# Patient Record
Sex: FEMALE | Race: WHITE | NOT HISPANIC OR LATINO | Employment: PART TIME | URBAN - METROPOLITAN AREA
[De-identification: names, ages, dates, MRNs, and addresses within clinical notes are randomized per-mention and may not be internally consistent; named-entity substitution may affect disease eponyms.]

---

## 2017-02-27 ENCOUNTER — OFFICE VISIT (OUTPATIENT)
Dept: FAMILY MEDICINE CLINIC | Facility: CLINIC | Age: 27
End: 2017-02-27

## 2017-02-27 VITALS
SYSTOLIC BLOOD PRESSURE: 120 MMHG | HEART RATE: 93 BPM | TEMPERATURE: 98.3 F | BODY MASS INDEX: 43.4 KG/M2 | OXYGEN SATURATION: 97 % | WEIGHT: 293 LBS | HEIGHT: 69 IN | DIASTOLIC BLOOD PRESSURE: 80 MMHG

## 2017-02-27 DIAGNOSIS — B02.9 HERPES ZOSTER WITHOUT COMPLICATION: Primary | ICD-10-CM

## 2017-02-27 DIAGNOSIS — B37.31 YEAST VAGINITIS: ICD-10-CM

## 2017-02-27 PROCEDURE — 99213 OFFICE O/P EST LOW 20 MIN: CPT | Performed by: FAMILY MEDICINE

## 2017-02-27 RX ORDER — ACETAMINOPHEN AND CODEINE PHOSPHATE 300; 60 MG/1; MG/1
1 TABLET ORAL EVERY 4 HOURS PRN
Qty: 20 TABLET | Refills: 0 | Status: SHIPPED | OUTPATIENT
Start: 2017-02-27 | End: 2017-11-20

## 2017-02-27 RX ORDER — VALACYCLOVIR HYDROCHLORIDE 500 MG/1
500 TABLET, FILM COATED ORAL 3 TIMES DAILY
COMMUNITY
End: 2017-11-20

## 2017-02-27 RX ORDER — FLUCONAZOLE 150 MG/1
150 TABLET ORAL ONCE
Qty: 1 TABLET | Refills: 0 | Status: SHIPPED | OUTPATIENT
Start: 2017-02-27 | End: 2017-02-27

## 2017-02-27 RX ORDER — CEPHALEXIN 500 MG/1
500 CAPSULE ORAL 3 TIMES DAILY
COMMUNITY
End: 2017-03-07

## 2017-02-27 NOTE — PROGRESS NOTES
Subjective   Lanie Moctezuma is a 27 y.o. female. CC: shingles    History of Present Illness   Lanie is a 27 year old female who comes in today for shingles.  She started with pain in her right scapular area.  She then broke out with a rash so she went to the [facility] and was diagnosed with shingles.  She was given Valtrex as well as Tylenol #3 for the pain.  She was given Keflex because it was felt that there was bacterial infection as well.    The pain is severe and she has needed to take the pain medication.  She will need a refill on that.  The rash is still there but is starting to dry up.  Her work will not let her come back for at least 2 weeks because of this.  She requests a script for Diflucan because she is developing a yeast infection secondary to the antibiotic.          The following portions of the patient's history were reviewed and updated as appropriate: allergies, current medications, past medical history, past social history, past surgical history and problem list.    Review of Systems   Constitutional: Negative.  Negative for fatigue, fever and unexpected weight change.   HENT: Negative.  Negative for congestion.    Respiratory: Negative.  Negative for cough, shortness of breath and wheezing.    Cardiovascular: Negative.  Negative for chest pain, palpitations and leg swelling.   Gastrointestinal: Negative.  Negative for abdominal pain, blood in stool, constipation, diarrhea, nausea and vomiting.   Genitourinary: Negative.  Negative for difficulty urinating and hematuria.   Musculoskeletal: Negative.  Negative for arthralgias and back pain.   Skin: Positive for rash (rash consistent with shingles).   Neurological: Negative.  Negative for dizziness, light-headedness and headaches.   Psychiatric/Behavioral: The patient is nervous/anxious.        Objective   Physical Exam   Constitutional: She appears well-developed and well-nourished.   Cardiovascular: Normal rate, regular rhythm and normal heart  sounds.    No murmur heard.  Pulmonary/Chest: Breath sounds normal. No respiratory distress.   Skin: Skin is warm and dry.   Blisters on the right breast and near the right scapula   Psychiatric: She has a normal mood and affect. Her behavior is normal.   Nursing note and vitals reviewed.    Vitals:    02/27/17 1059   BP: 120/80   Pulse: 93   Temp: 98.3 °F (36.8 °C)   SpO2: 97%     Assessment/Plan   Problems Addressed this Visit     None      Visit Diagnoses     Herpes zoster without complication    -  Primary    Relevant Medications    valACYclovir (VALTREX) 500 MG tablet    acetaminophen-codeine (TYLENOL/CODEINE #4) 300-60 MG per tablet    Yeast vaginitis        Relevant Medications    fluconazole (DIFLUCAN) 150 MG tablet        Also seen for postherpetic neuralgia.  ELMER obtained today and is appropriate.

## 2017-02-28 PROBLEM — B02.29 PHN (POSTHERPETIC NEURALGIA): Status: ACTIVE | Noted: 2017-02-28

## 2017-02-28 PROBLEM — B02.9 HERPES ZOSTER WITHOUT COMPLICATION: Status: ACTIVE | Noted: 2017-02-28

## 2017-03-07 ENCOUNTER — TELEPHONE (OUTPATIENT)
Dept: FAMILY MEDICINE CLINIC | Facility: CLINIC | Age: 27
End: 2017-03-07

## 2017-03-07 DIAGNOSIS — B37.31 YEAST VAGINITIS: Primary | ICD-10-CM

## 2017-03-07 RX ORDER — FLUCONAZOLE 150 MG/1
150 TABLET ORAL ONCE
Qty: 1 TABLET | Refills: 0 | Status: SHIPPED | OUTPATIENT
Start: 2017-03-07 | End: 2017-03-07

## 2017-04-10 RX ORDER — VENLAFAXINE HYDROCHLORIDE 150 MG/1
150 CAPSULE, EXTENDED RELEASE ORAL DAILY
Qty: 30 CAPSULE | Refills: 5 | Status: SHIPPED | OUTPATIENT
Start: 2017-04-10 | End: 2017-06-08 | Stop reason: SDUPTHER

## 2017-04-14 ENCOUNTER — TELEPHONE (OUTPATIENT)
Dept: FAMILY MEDICINE CLINIC | Facility: CLINIC | Age: 27
End: 2017-04-14

## 2017-04-14 NOTE — TELEPHONE ENCOUNTER
Pt called and said that she is in [place] cause her dad is currently in hospice and help taking care of him. She said that she thinks that her shingles are back but now under her arm on her side. She said that her work is not going to let her go back, and she is supposed to go back on Monday and she was wondering if she needs more short term disability filled out would you do it?

## 2017-04-17 ENCOUNTER — TELEPHONE (OUTPATIENT)
Dept: FAMILY MEDICINE CLINIC | Facility: CLINIC | Age: 27
End: 2017-04-17

## 2017-04-17 NOTE — TELEPHONE ENCOUNTER
Pt called and did go to a clinic in [place] like you told her to it is confirmed that she does have shingles its on her breast in crack of bottom. She said that if possible will need you to fill out short term disability paper work cause they wont let her come back until its gone.

## 2017-04-20 ENCOUNTER — OFFICE VISIT (OUTPATIENT)
Dept: FAMILY MEDICINE CLINIC | Facility: CLINIC | Age: 27
End: 2017-04-20

## 2017-04-20 ENCOUNTER — HOSPITAL ENCOUNTER (EMERGENCY)
Facility: HOSPITAL | Age: 27
Discharge: HOME | End: 2017-04-20
Attending: EMERGENCY MEDICINE | Admitting: EMERGENCY MEDICINE

## 2017-04-20 VITALS
DIASTOLIC BLOOD PRESSURE: 75 MMHG | SYSTOLIC BLOOD PRESSURE: 122 MMHG | TEMPERATURE: 97.8 F | HEIGHT: 69 IN | BODY MASS INDEX: 43.4 KG/M2 | WEIGHT: 293 LBS | OXYGEN SATURATION: 95 % | HEART RATE: 78 BPM

## 2017-04-20 VITALS
DIASTOLIC BLOOD PRESSURE: 82 MMHG | SYSTOLIC BLOOD PRESSURE: 126 MMHG | RESPIRATION RATE: 16 BRPM | BODY MASS INDEX: 43.4 KG/M2 | OXYGEN SATURATION: 98 % | WEIGHT: 293 LBS | HEIGHT: 69 IN | HEART RATE: 82 BPM | TEMPERATURE: 99.1 F

## 2017-04-20 DIAGNOSIS — H60.551 ACUTE REACTIVE OTITIS EXTERNA OF RIGHT EAR: ICD-10-CM

## 2017-04-20 DIAGNOSIS — R21 RASH, SKIN: Primary | ICD-10-CM

## 2017-04-20 DIAGNOSIS — H61.321 EXTERNAL EAR CANAL STENOSIS, INFLAMMATORY, RIGHT: ICD-10-CM

## 2017-04-20 DIAGNOSIS — L73.9 FOLLICULITIS: Primary | ICD-10-CM

## 2017-04-20 PROCEDURE — 99283 EMERGENCY DEPT VISIT LOW MDM: CPT

## 2017-04-20 PROCEDURE — 99213 OFFICE O/P EST LOW 20 MIN: CPT | Performed by: FAMILY MEDICINE

## 2017-04-20 RX ORDER — SULFAMETHOXAZOLE AND TRIMETHOPRIM 800; 160 MG/1; MG/1
1 TABLET ORAL 2 TIMES DAILY
Qty: 20 TABLET | Refills: 0 | Status: SHIPPED | OUTPATIENT
Start: 2017-04-20 | End: 2017-11-20

## 2017-04-20 RX ORDER — HYDROCODONE BITARTRATE AND ACETAMINOPHEN 5; 325 MG/1; MG/1
1 TABLET ORAL EVERY 6 HOURS PRN
COMMUNITY
End: 2017-04-20

## 2017-04-20 RX ORDER — HYDROCODONE BITARTRATE AND ACETAMINOPHEN 5; 325 MG/1; MG/1
1 TABLET ORAL EVERY 6 HOURS PRN
Qty: 12 TABLET | Refills: 0 | Status: SHIPPED | OUTPATIENT
Start: 2017-04-20 | End: 2017-11-20

## 2017-04-20 NOTE — ED TRIAGE NOTES
Pt reports rash to right breast, under arm, chest, right ear, and jaw that started yesterday. Pt believes this is shingles as she had it one month ago. Pt denies fever or chills but does have severe right ear pain.
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
## 2017-04-21 ENCOUNTER — TELEPHONE (OUTPATIENT)
Dept: SOCIAL WORK | Facility: HOSPITAL | Age: 27
End: 2017-04-21

## 2017-04-21 NOTE — TELEPHONE ENCOUNTER
ED follow-up phone call. Taking prescribed meds; states she is not yet feeling any better. Reminded to f/u w/ PCP in 3 days as needed, and reminded re RTER as necessary. No questions/concerns

## 2017-04-24 ENCOUNTER — TELEPHONE (OUTPATIENT)
Dept: FAMILY MEDICINE CLINIC | Facility: CLINIC | Age: 27
End: 2017-04-24

## 2017-04-24 DIAGNOSIS — H66.91 RIGHT OTITIS MEDIA, UNSPECIFIED CHRONICITY, UNSPECIFIED OTITIS MEDIA TYPE: Primary | ICD-10-CM

## 2017-04-27 PROBLEM — H60.501 ACUTE OTITIS EXTERNA OF RIGHT EAR: Status: ACTIVE | Noted: 2017-04-27

## 2017-06-08 ENCOUNTER — TELEPHONE (OUTPATIENT)
Dept: FAMILY MEDICINE CLINIC | Facility: CLINIC | Age: 27
End: 2017-06-08

## 2017-06-08 RX ORDER — VENLAFAXINE HYDROCHLORIDE 150 MG/1
150 CAPSULE, EXTENDED RELEASE ORAL DAILY
Qty: 30 CAPSULE | Refills: 5 | Status: SHIPPED | OUTPATIENT
Start: 2017-06-08 | End: 2017-11-10 | Stop reason: SDUPTHER

## 2017-07-11 RX ORDER — VENLAFAXINE HYDROCHLORIDE 150 MG/1
CAPSULE, EXTENDED RELEASE ORAL
Qty: 30 CAPSULE | Refills: 4 | Status: SHIPPED | OUTPATIENT
Start: 2017-07-11 | End: 2017-11-20

## 2017-09-20 RX ORDER — FEXOFENADINE HCL 180 MG/1
180 TABLET ORAL DAILY
Qty: 90 TABLET | Refills: 3 | Status: SHIPPED | OUTPATIENT
Start: 2017-09-20 | End: 2018-10-01 | Stop reason: SDUPTHER

## 2017-09-20 RX ORDER — MONTELUKAST SODIUM 10 MG/1
10 TABLET ORAL NIGHTLY
Qty: 90 TABLET | Refills: 3 | Status: SHIPPED | OUTPATIENT
Start: 2017-09-20 | End: 2018-10-01 | Stop reason: SDUPTHER

## 2017-11-06 ENCOUNTER — TELEPHONE (OUTPATIENT)
Dept: FAMILY MEDICINE CLINIC | Facility: CLINIC | Age: 27
End: 2017-11-06

## 2017-11-06 DIAGNOSIS — B37.31 YEAST VAGINITIS: Primary | ICD-10-CM

## 2017-11-06 RX ORDER — FLUCONAZOLE 150 MG/1
150 TABLET ORAL ONCE
Qty: 1 TABLET | Refills: 0 | Status: SHIPPED | OUTPATIENT
Start: 2017-11-06 | End: 2017-11-06

## 2017-11-06 NOTE — TELEPHONE ENCOUNTER
Pt called and said that she has an appointment with you on the 20 th but has a really bad yeast infection and was wondering if we can call in some diflucan?

## 2017-11-10 DIAGNOSIS — F32.A ANXIETY AND DEPRESSION: Primary | ICD-10-CM

## 2017-11-10 DIAGNOSIS — F41.9 ANXIETY AND DEPRESSION: Primary | ICD-10-CM

## 2017-11-10 RX ORDER — VENLAFAXINE HYDROCHLORIDE 150 MG/1
150 CAPSULE, EXTENDED RELEASE ORAL DAILY
Qty: 90 CAPSULE | Refills: 5 | Status: SHIPPED | OUTPATIENT
Start: 2017-11-10 | End: 2017-11-20 | Stop reason: DRUGHIGH

## 2017-11-20 ENCOUNTER — OFFICE VISIT (OUTPATIENT)
Dept: FAMILY MEDICINE CLINIC | Facility: CLINIC | Age: 27
End: 2017-11-20

## 2017-11-20 VITALS
OXYGEN SATURATION: 97 % | TEMPERATURE: 98.9 F | WEIGHT: 293 LBS | SYSTOLIC BLOOD PRESSURE: 125 MMHG | HEART RATE: 98 BPM | HEIGHT: 69 IN | DIASTOLIC BLOOD PRESSURE: 70 MMHG | BODY MASS INDEX: 43.4 KG/M2

## 2017-11-20 DIAGNOSIS — F32.A DEPRESSION, UNSPECIFIED DEPRESSION TYPE: ICD-10-CM

## 2017-11-20 DIAGNOSIS — F41.9 ANXIETY: Primary | ICD-10-CM

## 2017-11-20 PROCEDURE — 99213 OFFICE O/P EST LOW 20 MIN: CPT | Performed by: FAMILY MEDICINE

## 2017-11-20 RX ORDER — VENLAFAXINE HYDROCHLORIDE 75 MG/1
75 CAPSULE, EXTENDED RELEASE ORAL DAILY
Qty: 30 CAPSULE | Refills: 0 | Status: SHIPPED | OUTPATIENT
Start: 2017-11-20 | End: 2017-12-17 | Stop reason: SDUPTHER

## 2017-11-20 NOTE — PROGRESS NOTES
Subjective   Lanie Moctezuma is a 27 y.o. female. CC: depression    History of Present Illness   Lanie is a 27 year old female who comes in today for depression and anxiety.  She states that the 150 mg Effexor is not working well for her.  Still feeling anxious and depressed.  Has heard good things about Zoloft and would really like to try that.  Discussed weaning off the Effexor and explained that this will be a process.  Not suicidal.  She had been seeing a therapist but there were difficulties scheduling because of her work schedule.    She has been thinking about seeing a psychiatrist but all the ones she has called told her that she would need to have a referral.  Explained that she needs to call her insurance company to get a list of those in her network.  Showed her the number on her insurance card to call.      The following portions of the patient's history were reviewed and updated as appropriate: allergies, current medications, past medical history, past social history, past surgical history and problem list.    Review of Systems   Constitutional: Negative.  Negative for fatigue and unexpected weight change.   HENT: Negative.  Negative for congestion.    Respiratory: Negative.  Negative for cough, shortness of breath and wheezing.    Cardiovascular: Negative.  Negative for chest pain, palpitations and leg swelling.   Gastrointestinal: Negative.  Negative for abdominal pain, diarrhea and nausea.   Genitourinary: Negative.  Negative for difficulty urinating.   Musculoskeletal: Negative.  Negative for arthralgias and back pain.   Skin: Negative.  Negative for rash.   Neurological: Negative.  Negative for dizziness, light-headedness and headaches.   Psychiatric/Behavioral: Positive for dysphoric mood. Negative for sleep disturbance. The patient is nervous/anxious.        Objective   Physical Exam   Constitutional: She is oriented to person, place, and time. She appears well-developed and well-nourished.  "  Cardiovascular: Normal rate, regular rhythm and normal heart sounds.    Pulmonary/Chest: Effort normal and breath sounds normal.   Neurological: She is alert and oriented to person, place, and time.   Skin: Skin is warm and dry. No rash noted.   Psychiatric: She has a normal mood and affect. Her behavior is normal.   Nursing note and vitals reviewed.    Vitals:    11/20/17 1518   BP: 125/70   Pulse: 98   Temp: 98.9 °F (37.2 °C)   SpO2: 97%   Weight: (!) 334 lb 4.8 oz (152 kg)   Height: 69\" (175.3 cm)     Assessment/Plan   Problems Addressed this Visit        Other    Anxiety - Primary    Relevant Medications    venlafaxine XR (EFFEXOR XR) 75 MG 24 hr capsule    sertraline (ZOLOFT) 50 MG tablet    Depression    Relevant Medications    venlafaxine XR (EFFEXOR XR) 75 MG 24 hr capsule    sertraline (ZOLOFT) 50 MG tablet        Explained how to wean off the Effexor.  She is to wean off of that before starting on the Zoloft.  She will call her insurance company for referral to psychiatrist.  She will also schedule an appointment with a therapist.  List of names given to her.         "

## 2017-12-17 DIAGNOSIS — F32.A DEPRESSION, UNSPECIFIED DEPRESSION TYPE: ICD-10-CM

## 2017-12-17 DIAGNOSIS — F41.9 ANXIETY: ICD-10-CM

## 2017-12-18 RX ORDER — VENLAFAXINE HYDROCHLORIDE 75 MG/1
CAPSULE, EXTENDED RELEASE ORAL
Qty: 30 CAPSULE | Refills: 0 | Status: SHIPPED | OUTPATIENT
Start: 2017-12-18 | End: 2018-02-05

## 2018-02-05 ENCOUNTER — OFFICE VISIT (OUTPATIENT)
Dept: FAMILY MEDICINE CLINIC | Facility: CLINIC | Age: 28
End: 2018-02-05

## 2018-02-05 VITALS
SYSTOLIC BLOOD PRESSURE: 120 MMHG | DIASTOLIC BLOOD PRESSURE: 70 MMHG | BODY MASS INDEX: 43.4 KG/M2 | HEIGHT: 69 IN | HEART RATE: 100 BPM | WEIGHT: 293 LBS | TEMPERATURE: 98.2 F | OXYGEN SATURATION: 97 %

## 2018-02-05 DIAGNOSIS — F33.2 SEVERE EPISODE OF RECURRENT MAJOR DEPRESSIVE DISORDER, WITHOUT PSYCHOTIC FEATURES (HCC): ICD-10-CM

## 2018-02-05 DIAGNOSIS — F41.9 ANXIETY: Primary | ICD-10-CM

## 2018-02-05 DIAGNOSIS — Z79.899 MEDICATION MANAGEMENT: ICD-10-CM

## 2018-02-05 PROCEDURE — 99214 OFFICE O/P EST MOD 30 MIN: CPT | Performed by: NURSE PRACTITIONER

## 2018-02-05 RX ORDER — HYDROXYZINE HYDROCHLORIDE 25 MG/1
25 TABLET, FILM COATED ORAL EVERY 8 HOURS PRN
Qty: 90 TABLET | Refills: 0 | Status: SHIPPED | OUTPATIENT
Start: 2018-02-05 | End: 2018-07-31

## 2018-02-05 RX ORDER — SERTRALINE HYDROCHLORIDE 100 MG/1
100 TABLET, FILM COATED ORAL DAILY
Qty: 30 TABLET | Refills: 1 | Status: SHIPPED | OUTPATIENT
Start: 2018-02-05 | End: 2018-07-31

## 2018-02-05 RX ORDER — SERTRALINE HYDROCHLORIDE 100 MG/1
100 TABLET, FILM COATED ORAL DAILY
Qty: 30 TABLET | Refills: 1 | Status: SHIPPED | OUTPATIENT
Start: 2018-02-05 | End: 2018-02-05 | Stop reason: SDUPTHER

## 2018-02-05 NOTE — PROGRESS NOTES
Subjective   Lanie Moctezuma is a 28 y.o. female presents with severe anxiety and depression. Started around 2011, treated previously with celexa 2011, then tried effexor x 2 years. Has taken ativan as needed but doesn't like to take that long term. At her last visit with Dr. Michel, she was weaning off effexor to start Zoloft. Took her first zoloft last night.   After starting meds, 3 bouts of depression, serious. Has been evaluated at the [facility] [place] [facility], recommended for intensive outpatient therapy. Has thoughts of suicide daily, but no current plan to act. Currently seeing hai Jimenez. Has not followed psychiatry in a while as both doctors left the area. She has not established with another Psych.     Anxiety   Presents for follow-up visit. Symptoms include decreased concentration, depressed mood, excessive worry, feeling of choking, nervous/anxious behavior, panic and suicidal ideas. Patient reports no insomnia, irritability or nausea. Symptoms occur most days. The severity of symptoms is interfering with daily activities. The quality of sleep is good. Nighttime awakenings: occasional.     Her past medical history is significant for anxiety/panic attacks and depression. Compliance with medications is %.   Depression   Visit Type: initial  Onset of symptoms: 1 to 6 months ago  Patient presents with the following symptoms: anhedonia, decreased concentration, depressed mood, excessive worry, feelings of hopelessness, feelings of worthlessness, hypersomnia, nervousness/anxiety, panic, suicidal ideas and thoughts of death.  Patient is not experiencing: insomnia, irritability, suicidal planning and weight gain.  Frequency of symptoms: most days   Severity: interfering with daily activities   Aggravated by: medication  Sleep quality: good  Nighttime awakenings: occasional  Risk factors: change in medication and family history  Patient has a history of: anxiety/panic attacks, depression and mental  illness  No history of: bipolar disorder, suicide attempt and substance abuse  Treatment tried: benzodiazepine, SSRI and individual therapy  Compliance with treatment: good  Improvement on treatment: mild           The following portions of the patient's history were reviewed and updated as appropriate: allergies, current medications, past family history, past medical history, past social history, past surgical history and problem list.    Review of Systems   Constitutional: Negative.  Negative for irritability and weight gain.   HENT: Negative.    Eyes: Negative.    Respiratory: Negative.    Cardiovascular: Negative.    Gastrointestinal: Negative.  Negative for nausea.   Endocrine: Negative.    Genitourinary: Negative.    Musculoskeletal: Negative.    Skin: Negative.    Allergic/Immunologic: Negative.    Neurological: Negative.    Hematological: Negative.    Psychiatric/Behavioral: Positive for decreased concentration and suicidal ideas. Negative for substance abuse. The patient is nervous/anxious. The patient does not have insomnia.        Objective   Physical Exam   Constitutional: She is oriented to person, place, and time. She appears well-developed and well-nourished.   HENT:   Head: Normocephalic and atraumatic.   Right Ear: Tympanic membrane, external ear and ear canal normal.   Left Ear: Tympanic membrane, external ear and ear canal normal.   Nose: Nose normal.   Mouth/Throat: Oropharynx is clear and moist and mucous membranes are normal.   Eyes: Conjunctivae are normal. Pupils are equal, round, and reactive to light.   Neck: Neck supple.   Cardiovascular: Normal rate, regular rhythm and normal heart sounds.  Exam reveals no gallop and no friction rub.    No murmur heard.  Pulmonary/Chest: Effort normal and breath sounds normal. No respiratory distress. She has no wheezes. She has no rales.   Abdominal: Soft. Bowel sounds are normal. She exhibits no distension. There is no tenderness.   Neurological: She is  alert and oriented to person, place, and time.   Skin: Skin is warm and dry.   Psychiatric: Her speech is normal and behavior is normal. Judgment and thought content normal. Cognition and memory are normal. She exhibits a depressed mood.   Vitals reviewed.      Assessment/Plan   Lanie was seen today for anxiety.    Diagnoses and all orders for this visit:    Anxiety  -     Thyroid Panel With TSH  -     Ambulatory Referral to Psychiatry    Severe episode of recurrent major depressive disorder, without psychotic features    Medication management  -     Comprehensive metabolic panel  -     CBC & Differential    Other orders  -     hydrOXYzine (ATARAX) 25 MG tablet; Take 1 tablet by mouth Every 8 (Eight) Hours As Needed for Itching.  -     Discontinue: sertraline (ZOLOFT) 100 MG tablet; Take 1 tablet by mouth Daily.        Patient agreed she would call 911 or talk to her mother for thoughts of self harm.  Agreeable to treatment plan.  Will refer to get established with psych, Dr. Boucher  Continue treatment with Dr. Sabas Fermin  Follow up in one month to evaluate medication effectiveness.   Increase zoloft to 100 mg daily  Start atarax as needed for panic/anxiety

## 2018-02-06 LAB
ALBUMIN SERPL-MCNC: 4 G/DL (ref 3.5–5.2)
ALBUMIN/GLOB SERPL: 1.4 G/DL
ALP SERPL-CCNC: 77 U/L (ref 39–117)
ALT SERPL-CCNC: 15 U/L (ref 1–33)
AST SERPL-CCNC: 8 U/L (ref 1–32)
BASOPHILS # BLD AUTO: 0.03 10*3/MM3 (ref 0–0.2)
BASOPHILS NFR BLD AUTO: 0.3 % (ref 0–1.5)
BILIRUB SERPL-MCNC: 0.2 MG/DL (ref 0.1–1.2)
BUN SERPL-MCNC: 11 MG/DL (ref 6–20)
BUN/CREAT SERPL: 13.6 (ref 7–25)
CALCIUM SERPL-MCNC: 9.1 MG/DL (ref 8.6–10.5)
CHLORIDE SERPL-SCNC: 99 MMOL/L (ref 98–107)
CO2 SERPL-SCNC: 24.9 MMOL/L (ref 22–29)
CREAT SERPL-MCNC: 0.81 MG/DL (ref 0.57–1)
EOSINOPHIL # BLD AUTO: 0.25 10*3/MM3 (ref 0–0.7)
EOSINOPHIL NFR BLD AUTO: 2.6 % (ref 0.3–6.2)
ERYTHROCYTE [DISTWIDTH] IN BLOOD BY AUTOMATED COUNT: 17 % (ref 11.7–13)
FT4I SERPL CALC-MCNC: 1.8 (ref 1.2–4.9)
GFR SERPLBLD CREATININE-BSD FMLA CKD-EPI: 101 ML/MIN/1.73
GFR SERPLBLD CREATININE-BSD FMLA CKD-EPI: 84 ML/MIN/1.73
GLOBULIN SER CALC-MCNC: 2.9 GM/DL
GLUCOSE SERPL-MCNC: 95 MG/DL (ref 65–99)
HCT VFR BLD AUTO: 37 % (ref 35.6–45.5)
HGB BLD-MCNC: 11.1 G/DL (ref 11.9–15.5)
IMM GRANULOCYTES # BLD: 0.03 10*3/MM3 (ref 0–0.03)
IMM GRANULOCYTES NFR BLD: 0.3 % (ref 0–0.5)
LYMPHOCYTES # BLD AUTO: 2.24 10*3/MM3 (ref 0.9–4.8)
LYMPHOCYTES NFR BLD AUTO: 23.6 % (ref 19.6–45.3)
MCH RBC QN AUTO: 24.5 PG (ref 26.9–32)
MCHC RBC AUTO-ENTMCNC: 30 G/DL (ref 32.4–36.3)
MCV RBC AUTO: 81.7 FL (ref 80.5–98.2)
MONOCYTES # BLD AUTO: 0.5 10*3/MM3 (ref 0.2–1.2)
MONOCYTES NFR BLD AUTO: 5.3 % (ref 5–12)
NEUTROPHILS # BLD AUTO: 6.43 10*3/MM3 (ref 1.9–8.1)
NEUTROPHILS NFR BLD AUTO: 67.9 % (ref 42.7–76)
PLATELET # BLD AUTO: 359 10*3/MM3 (ref 140–500)
POTASSIUM SERPL-SCNC: 4.6 MMOL/L (ref 3.5–5.2)
PROT SERPL-MCNC: 6.9 G/DL (ref 6–8.5)
RBC # BLD AUTO: 4.53 10*6/MM3 (ref 3.9–5.2)
SODIUM SERPL-SCNC: 137 MMOL/L (ref 136–145)
T3RU NFR SERPL: 23 % (ref 24–39)
T4 SERPL-MCNC: 8 UG/DL (ref 4.5–12)
TSH SERPL DL<=0.005 MIU/L-ACNC: 1.28 UIU/ML (ref 0.45–4.5)
WBC # BLD AUTO: 9.48 10*3/MM3 (ref 4.5–10.7)

## 2018-03-08 ENCOUNTER — OFFICE VISIT (OUTPATIENT)
Dept: FAMILY MEDICINE CLINIC | Facility: CLINIC | Age: 28
End: 2018-03-08

## 2018-03-08 VITALS
RESPIRATION RATE: 18 BRPM | HEART RATE: 93 BPM | DIASTOLIC BLOOD PRESSURE: 80 MMHG | OXYGEN SATURATION: 98 % | SYSTOLIC BLOOD PRESSURE: 128 MMHG | TEMPERATURE: 98.3 F

## 2018-03-08 DIAGNOSIS — F41.9 ANXIETY: Primary | ICD-10-CM

## 2018-03-08 DIAGNOSIS — F32.A DEPRESSION, UNSPECIFIED DEPRESSION TYPE: ICD-10-CM

## 2018-03-08 DIAGNOSIS — R60.0 LOWER EXTREMITY EDEMA: ICD-10-CM

## 2018-03-08 PROCEDURE — 99214 OFFICE O/P EST MOD 30 MIN: CPT | Performed by: NURSE PRACTITIONER

## 2018-03-08 RX ORDER — LORAZEPAM 0.5 MG/1
TABLET ORAL
Refills: 0 | COMMUNITY
Start: 2018-02-26 | End: 2018-07-31

## 2018-03-08 RX ORDER — TRIAMTERENE AND HYDROCHLOROTHIAZIDE 37.5; 25 MG/1; MG/1
1 CAPSULE ORAL DAILY PRN
Qty: 30 CAPSULE | Refills: 3 | Status: SHIPPED | OUTPATIENT
Start: 2018-03-08 | End: 2019-01-31

## 2018-03-08 RX ORDER — VILAZODONE HYDROCHLORIDE 20 MG/1
20 TABLET ORAL DAILY
COMMUNITY
End: 2018-07-31

## 2018-03-08 NOTE — PROGRESS NOTES
Subjective   Lanie Moctezuma is a 28 y.o. female.   Viibryd and ativan, weaned off zoloft, now followed by Dr. Drake for psychiatry. Seeing a counselor in [facility] as well, referred by Dr. Drake. Feels better, but at times manic and very martino. Has a follow up with Dr. Drake in the next few weeks. Has increased nausea related to medication. Instructed to take in the daytime. Trying to monitor diet, sleep patterns, mood. Interested in starting antiinflammatory diet and eventually stopping medications.    Does have reports of frequent diarrhea that has been ongoing since 2011, around the time she started antidepressants. Concerned this is a side effect. Hx of gallbladder removed in 2010. Has increased fiber but has noted bright red blood in stools in the past week or two.     Depression   Visit Type: follow-up  Patient presents with the following symptoms: anhedonia (improving), depressed mood, excessive worry, feelings of worthlessness (improving) and nervousness/anxiety.  Patient is not experiencing: shortness of breath, suicidal ideas, suicidal planning, thoughts of death, weight gain and weight loss.  Frequency of symptoms: most days   Severity: moderate          The following portions of the patient's history were reviewed and updated as appropriate: allergies, current medications, past family history, past medical history, past social history, past surgical history and problem list.    Review of Systems   Constitutional: Negative.  Negative for weight gain and weight loss.   HENT: Negative.    Eyes: Negative.    Respiratory: Negative.  Negative for shortness of breath.    Cardiovascular: Negative.    Gastrointestinal: Positive for diarrhea.   Endocrine: Negative.    Genitourinary: Negative.    Musculoskeletal: Negative.    Skin: Negative.    Allergic/Immunologic: Negative.    Neurological: Negative.    Psychiatric/Behavioral: Positive for agitation, dysphoric mood (improving) and sleep  disturbance. Negative for self-injury and suicidal ideas. The patient is nervous/anxious.        Objective   Physical Exam   Constitutional: She is oriented to person, place, and time. She appears well-developed and well-nourished.   HENT:   Head: Normocephalic and atraumatic.   Eyes: Pupils are equal, round, and reactive to light.   Neck: Neck supple.   Cardiovascular: Normal rate, regular rhythm and normal heart sounds.  Exam reveals no gallop and no friction rub.    No murmur heard.  Pulmonary/Chest: Effort normal and breath sounds normal. No respiratory distress. She has no wheezes. She has no rales.   Neurological: She is alert and oriented to person, place, and time.   Skin: Skin is warm and dry.   Psychiatric: She has a normal mood and affect.   Vitals reviewed.      Assessment/Plan   Diagnoses and all orders for this visit:    Anxiety    Depression, unspecified depression type    Lower extremity edema    Other orders  -     triamterene-hydrochlorothiazide (DYAZIDE) 37.5-25 MG per capsule; Take 1 capsule by mouth Daily As Needed (lower extremity edema).    Instructed patient to start OTC hemorrhoidal medication. For continued blood in stool after one week, she is to call office and will refer to GI. Patient agreeable.       PHQ-2/PHQ-9 Depression Screening 3/8/2018   Little interest or pleasure in doing things 1   Feeling down, depressed, or hopeless 1   Trouble falling or staying asleep, or sleeping too much 1   Feeling tired or having little energy 1   Poor appetite or overeating 1   Feeling bad about yourself - or that you are a failure or have let yourself or your family down 1   Trouble concentrating on things, such as reading the newspaper or watching television 1   Moving or speaking so slowly that other people could have noticed. Or the opposite - being so fidgety or restless that you have been moving around a lot more than usual 0   Thoughts that you would be better off dead, or of hurting yourself  in some way 0   Total Score 7   If you checked off any problems, how difficult have these problems made it for you to do your work, take care of things at home, or get along with other people? Extremely dIfficult     PHQ-2/PHQ-9 Depression Screening 2/5/2018   Little interest or pleasure in doing things 1   Feeling down, depressed, or hopeless 3   Trouble falling or staying asleep, or sleeping too much 3   Feeling tired or having little energy 3   Poor appetite or overeating 3   Feeling bad about yourself - or that you are a failure or have let yourself or your family down 3   Trouble concentrating on things, such as reading the newspaper or watching television 3   Moving or speaking so slowly that other people could have noticed. Or the opposite - being so fidgety or restless that you have been moving around a lot more than usual 3   Thoughts that you would be better off dead, or of hurting yourself in some way 3   Total Score 25   If you checked off any problems, how difficult have these problems made it for you to do your work, take care of things at home, or get along with other people?

## 2018-05-02 ENCOUNTER — TELEPHONE (OUTPATIENT)
Dept: FAMILY MEDICINE CLINIC | Facility: CLINIC | Age: 28
End: 2018-05-02

## 2018-05-02 NOTE — TELEPHONE ENCOUNTER
"Patient called stating she is having severe edema in her legs, went to urgent care and was basically told she was \"fat\", has seen Ciara and prescribed lasix. Patient is concerned with kidney disease and CHF, wants to know what to do  "

## 2018-05-03 ENCOUNTER — TELEPHONE (OUTPATIENT)
Dept: FAMILY MEDICINE CLINIC | Facility: CLINIC | Age: 28
End: 2018-05-03

## 2018-05-03 NOTE — TELEPHONE ENCOUNTER
Needs to be seen. Reviewed chart, kidney function was good in 2/2018. Unsure of cardiac history. We can discuss.

## 2018-05-03 NOTE — TELEPHONE ENCOUNTER
Pt aware to notify hospital to send records to PCP. If admitted will need to follow up after d/c if goes home from ED lets have her scheduled soon with me or NP.

## 2018-05-03 NOTE — TELEPHONE ENCOUNTER
Spoke with the patient's mother the patient is actually at [facility] now getting some test ran as well as a ultrasound

## 2018-07-31 ENCOUNTER — OFFICE VISIT (OUTPATIENT)
Dept: FAMILY MEDICINE CLINIC | Facility: CLINIC | Age: 28
End: 2018-07-31

## 2018-07-31 VITALS
HEART RATE: 110 BPM | SYSTOLIC BLOOD PRESSURE: 120 MMHG | DIASTOLIC BLOOD PRESSURE: 70 MMHG | BODY MASS INDEX: 43.4 KG/M2 | HEIGHT: 69 IN | OXYGEN SATURATION: 97 % | WEIGHT: 293 LBS | TEMPERATURE: 98.2 F

## 2018-07-31 DIAGNOSIS — F41.9 ANXIETY: Primary | ICD-10-CM

## 2018-07-31 PROCEDURE — 99213 OFFICE O/P EST LOW 20 MIN: CPT | Performed by: NURSE PRACTITIONER

## 2018-07-31 RX ORDER — CLONAZEPAM 0.5 MG/1
0.5 TABLET ORAL 2 TIMES DAILY PRN
COMMUNITY

## 2018-10-01 RX ORDER — MONTELUKAST SODIUM 10 MG/1
10 TABLET ORAL NIGHTLY
Qty: 90 TABLET | Refills: 1 | Status: SHIPPED | OUTPATIENT
Start: 2018-10-01 | End: 2019-03-31 | Stop reason: SDUPTHER

## 2018-10-01 RX ORDER — FEXOFENADINE HCL 180 MG/1
180 TABLET ORAL DAILY
Qty: 90 TABLET | Refills: 1 | Status: SHIPPED | OUTPATIENT
Start: 2018-10-01 | End: 2019-03-31 | Stop reason: SDUPTHER

## 2018-10-15 ENCOUNTER — OFFICE (OUTPATIENT)
Dept: URBAN - METROPOLITAN AREA CLINIC 75 | Facility: CLINIC | Age: 28
End: 2018-10-15

## 2018-10-15 VITALS
WEIGHT: 293 LBS | DIASTOLIC BLOOD PRESSURE: 82 MMHG | SYSTOLIC BLOOD PRESSURE: 124 MMHG | HEART RATE: 71 BPM | HEIGHT: 69 IN

## 2018-10-15 DIAGNOSIS — K62.5 HEMORRHAGE OF ANUS AND RECTUM: ICD-10-CM

## 2018-10-15 DIAGNOSIS — R19.7 DIARRHEA, UNSPECIFIED: ICD-10-CM

## 2018-10-15 DIAGNOSIS — R10.30 LOWER ABDOMINAL PAIN, UNSPECIFIED: ICD-10-CM

## 2018-10-15 PROCEDURE — 99204 OFFICE O/P NEW MOD 45 MIN: CPT

## 2018-11-29 ENCOUNTER — ON CAMPUS - OUTPATIENT (OUTPATIENT)
Dept: URBAN - METROPOLITAN AREA HOSPITAL 108 | Facility: HOSPITAL | Age: 28
End: 2018-11-29

## 2018-11-29 DIAGNOSIS — K29.50 UNSPECIFIED CHRONIC GASTRITIS WITHOUT BLEEDING: ICD-10-CM

## 2018-11-29 DIAGNOSIS — R10.30 LOWER ABDOMINAL PAIN, UNSPECIFIED: ICD-10-CM

## 2018-11-29 DIAGNOSIS — K62.5 HEMORRHAGE OF ANUS AND RECTUM: ICD-10-CM

## 2018-11-29 DIAGNOSIS — R19.7 DIARRHEA, UNSPECIFIED: ICD-10-CM

## 2018-11-29 DIAGNOSIS — K64.8 OTHER HEMORRHOIDS: ICD-10-CM

## 2018-11-29 PROCEDURE — 43239 EGD BIOPSY SINGLE/MULTIPLE: CPT | Performed by: INTERNAL MEDICINE

## 2018-11-29 PROCEDURE — 45380 COLONOSCOPY AND BIOPSY: CPT | Performed by: INTERNAL MEDICINE

## 2019-01-31 ENCOUNTER — OFFICE VISIT (OUTPATIENT)
Dept: FAMILY MEDICINE CLINIC | Facility: CLINIC | Age: 29
End: 2019-01-31

## 2019-01-31 VITALS
HEART RATE: 85 BPM | BODY MASS INDEX: 43.4 KG/M2 | DIASTOLIC BLOOD PRESSURE: 88 MMHG | SYSTOLIC BLOOD PRESSURE: 142 MMHG | WEIGHT: 293 LBS | HEIGHT: 69 IN | OXYGEN SATURATION: 98 % | TEMPERATURE: 98.3 F

## 2019-01-31 DIAGNOSIS — F41.9 ANXIETY: ICD-10-CM

## 2019-01-31 DIAGNOSIS — J30.9 ATOPIC RHINITIS: ICD-10-CM

## 2019-01-31 DIAGNOSIS — E66.01 MORBID OBESITY (HCC): Primary | ICD-10-CM

## 2019-01-31 DIAGNOSIS — R53.83 OTHER FATIGUE: ICD-10-CM

## 2019-01-31 PROCEDURE — 99213 OFFICE O/P EST LOW 20 MIN: CPT | Performed by: NURSE PRACTITIONER

## 2019-01-31 RX ORDER — COLESEVELAM 180 1/1
1250 TABLET ORAL 2 TIMES DAILY
Refills: 6 | COMMUNITY
Start: 2018-11-29 | End: 2020-01-30

## 2019-01-31 RX ORDER — CALCIUM POLYCARBOPHIL 625 MG 625 MG/1
1 TABLET ORAL DAILY
COMMUNITY
End: 2021-12-06

## 2019-01-31 RX ORDER — CALCIUM POLYCARBOPHIL 625 MG 625 MG/1
1 TABLET ORAL DAILY
COMMUNITY
End: 2020-01-30

## 2019-01-31 RX ORDER — FLUTICASONE PROPIONATE 50 MCG
2 SPRAY, SUSPENSION (ML) NASAL DAILY
Qty: 3 BOTTLE | Refills: 3 | Status: SHIPPED | OUTPATIENT
Start: 2019-01-31 | End: 2020-02-03

## 2019-02-05 NOTE — PROGRESS NOTES
Subjective   Lanie Moctezuma is a 29 y.o. female presents for follow up for anxiety. She is currently followed by Dr. Drake. She is currently in counseling as well. Denies any current issues with anxiety, doing much better. She is not taking any medication other than klonopin prn. Takes very rarely.     She does report fatigue. She has increased snoring and has a difficult time losing weight. Interested in a sleep study.     Runny nose, previously prescribed flonase, would like a refill.     Fatigue   This is a chronic problem. The current episode started more than 1 month ago. The problem occurs daily. The problem has been unchanged. Associated symptoms include fatigue. Pertinent negatives include no abdominal pain, anorexia, arthralgias, change in bowel habit, chest pain, chills, congestion, coughing, diaphoresis, fever, headaches, joint swelling, myalgias, nausea, neck pain, numbness, rash, sore throat, swollen glands, urinary symptoms, vertigo, visual change, vomiting or weakness. Nothing aggravates the symptoms. She has tried nothing for the symptoms. The treatment provided no relief.   Anxiety   Presents for follow-up visit. Symptoms include nervous/anxious behavior (occasionally). Patient reports no chest pain, compulsions, confusion, decreased concentration, depressed mood, dizziness, dry mouth, excessive worry, feeling of choking, hyperventilation, impotence, insomnia, irritability, malaise, muscle tension, nausea, obsessions, palpitations, panic, restlessness, shortness of breath or suicidal ideas. Symptoms occur rarely. The severity of symptoms is mild. The quality of sleep is good.     Compliance with medications: takes prn.        The following portions of the patient's history were reviewed and updated as appropriate: allergies, current medications, past family history, past medical history, past social history, past surgical history and problem list.    Review of Systems   Constitutional: Positive  for fatigue. Negative for chills, diaphoresis, fever and irritability.   HENT: Negative for congestion and sore throat.    Respiratory: Negative for cough and shortness of breath.    Cardiovascular: Negative for chest pain and palpitations.   Gastrointestinal: Negative for abdominal pain, anorexia, change in bowel habit, nausea and vomiting.   Genitourinary: Negative for impotence.   Musculoskeletal: Negative for arthralgias, joint swelling, myalgias and neck pain.   Skin: Negative for rash.   Neurological: Negative for dizziness, vertigo, weakness, numbness and headaches.   Psychiatric/Behavioral: Negative for confusion, decreased concentration and suicidal ideas. The patient is nervous/anxious (occasionally). The patient does not have insomnia.        Objective   Physical Exam   Constitutional: She is oriented to person, place, and time. She appears well-developed and well-nourished.   Cardiovascular: Normal rate, regular rhythm and normal heart sounds. Exam reveals no gallop and no friction rub.   No murmur heard.  Pulmonary/Chest: Effort normal and breath sounds normal. No stridor. No respiratory distress. She has no wheezes. She has no rales.   Neurological: She is alert and oriented to person, place, and time.   Psychiatric: She has a normal mood and affect.   Vitals reviewed.      Assessment/Plan   Lanie was seen today for follow-up.    Diagnoses and all orders for this visit:    Morbid obesity (CMS/[facility])  -     Ambulatory Referral to Sleep Medicine    Other fatigue  -     Ambulatory Referral to Sleep Medicine    Atopic rhinitis  -     fluticasone (FLONASE) 50 MCG/ACT nasal spray; 2 sprays into the nostril(s) as directed by provider Daily.    Anxiety      Continue current medication  Labs completed with Dr. Drake, will have labs faxed  Continue flonase, rx sent  Referral to sleep medicine

## 2019-02-20 ENCOUNTER — APPOINTMENT (OUTPATIENT)
Dept: SLEEP MEDICINE | Facility: HOSPITAL | Age: 29
End: 2019-02-20

## 2019-03-27 ENCOUNTER — OFFICE VISIT (OUTPATIENT)
Dept: SLEEP MEDICINE | Facility: HOSPITAL | Age: 29
End: 2019-03-27

## 2019-03-27 VITALS
WEIGHT: 293 LBS | SYSTOLIC BLOOD PRESSURE: 147 MMHG | OXYGEN SATURATION: 97 % | BODY MASS INDEX: 43.4 KG/M2 | HEART RATE: 80 BPM | DIASTOLIC BLOOD PRESSURE: 78 MMHG | HEIGHT: 69 IN

## 2019-03-27 DIAGNOSIS — G47.10 HYPERSOMNIA: Primary | ICD-10-CM

## 2019-03-27 DIAGNOSIS — G47.8 NON-RESTORATIVE SLEEP: ICD-10-CM

## 2019-03-27 PROCEDURE — 99213 OFFICE O/P EST LOW 20 MIN: CPT | Performed by: FAMILY MEDICINE

## 2019-03-27 PROCEDURE — G0463 HOSPITAL OUTPT CLINIC VISIT: HCPCS

## 2019-03-27 NOTE — PROGRESS NOTES
[facility] New Patient/Consultation       Reason for Consultation: Snoring and chronic fatigue    Referring provider: NAGA Silva      Patient Care Team:  Joanne Urbina APRN as PCP - General (Family Medicine)  Alpa Michel MD (Inactive) as PCP - Family Medicine  Jose Bell MD as Consulting Physician (Sleep Medicine)      History of present illness:  Thank you for asking me to see your patient.  The patient is a 29 y.o. female who reports snoring and chronic fatigue.  Per records patient's thyroid labs were last checked a year ago with normal TSH and T4 however slightly abnormal T3.  Has not been rechecked since then.  About a year ago her CBC also showed hemoglobin 11.1.  Reports had a colonoscopy in 11/2018 due to intermittent bleeding per rectum; was not bright red however not tarry either. Was found to have internal hemorrhoids and esophagitis. Patient is not currently on any iron supplementation.  Does not seem to be an iron panel on record.  Patient reports nonrestorative sleep and hypersomnia.  She wakes several times at night.  Particularly has difficulty breathing while lying flat.  Tonsillectomy in 2013    Sleep Schedule:  Bed time: 2 AM to 4 AM  Sleep latency: Varies anywhere from 30 minutes to hours  Wake time: 10:30 AM to 12 PM  Average hours slept: 7-9  Number of naps per day: 0 on weekdays and 0-1 on weekends  Rotating shifts?:yes and Works night shift from 6 PM to 12 AM Monday through Friday  Nocturia: yes and 1-3 times  Electronics in bedroom: yes    Excessive daytime sleepiness or drowsiness: yes  Any accidents at work due to sleepiness in the last 5 years:no  Any difficulty driving due to sleepiness or being drowsy: no  Weight changed in the last 5 years:yes and Gained 100 pounds    Snoring:yes  Witnessed apneas:Unsure  Have you ever awakened gasping for breath, coughing, choking or respiratory discomfort: yes  Morning headaches: yes  Awaken with a sore  throat or dry mouth: yes    Any reports of leg jerking at night: no  Urge sensations: no  Does pain disrupt sleep: no  Sweating during sleep:no  Teeth grinding: no    Any sudden episodes of sleep during the day: no  Sleep paralysis/hallucinations: no  Muscle weakness with laughing/anger: yes and When she laughs  Nightmares: no  Sleep walking: no    Are you sleepy when you increase your sleep time: no  Do you sleep better away from your own bed: no    Review of Systems negative except for: Nasal congestion, postnasal drip, swollen joints, poor appetite, swollen ankles, shortness of breath, dizziness, anxiety, depression, frequent heartburn, diarrhea, per sleep questionnaire    Social History: Systems use a wrap for [employer]; no cigarette or alcohol or drug use; 1-3 coffees or teas a day    Allergies:  Doxycycline    Family History: SOFI yes       Current Outpatient Medications:   •  calcium polycarbophil (FIBERCON) 625 MG tablet, Take 1 capsule by mouth Daily., Disp: , Rfl:   •  calcium polycarbophil (FIBERCON) 625 MG tablet, Take 1 tablet by mouth Daily., Disp: , Rfl:   •  Cholecalciferol (VITAMIN D3) 1000 units capsule, Take 4,000 Units by mouth., Disp: , Rfl:   •  clonazePAM (KlonoPIN) 0.5 MG tablet, Take 0.5 mg by mouth 2 (Two) Times a Day As Needed for Seizures., Disp: , Rfl:   •  colesevelam (WELCHOL) 625 MG tablet, Take 1,250 mg by mouth 2 (Two) Times a Day., Disp: , Rfl: 6  •  fexofenadine (ALLEGRA) 180 MG tablet, Take 1 tablet by mouth Daily., Disp: 90 tablet, Rfl: 1  •  fluticasone (FLONASE) 50 MCG/ACT nasal spray, 2 sprays into the nostril(s) as directed by provider Daily., Disp: 3 bottle, Rfl: 3  •  montelukast (SINGULAIR) 10 MG tablet, Take 1 tablet by mouth Every Night., Disp: 90 tablet, Rfl: 1  •  Probiotic Product (PROBIOTIC-10) capsule, Take 1 capsule by mouth Daily., Disp: , Rfl:     Vital Signs:    Vitals:    03/27/19 1016   BP: 147/78   Pulse: 80   SpO2: 97%   Weight: (!) 157 kg (346 lb)   Height: 175.3  "cm (69\")      Body mass index is 51.1 kg/m².  Neck Circumference: 17 inches      Physical Exam:   General Alert and oriented. No acute distress noted   Pharynx/Throat Class III Mallampati airway, large tongue, no evidence of redundant lateral pharyngeal tissue. No oral lesions. No thrush. Moist mucous membranes.   Head Normocephalic. Symmetrical. Atraumatic.    Nose No septal deviation. No drainage   Chest Wall Normal shape. Symmetric expansion with respiration. No tenderness.   Neck Trachea midline, no thyromegaly or adenopathy    Lungs Clear to auscultation bilaterally. No wheezes. No rhonchi. No rales. Respirations regular, even and unlabored.   Heart Regular rhythm and normal rate. Normal S1 and S2. No murmur   Abdomen Soft, non-tender and non-distended. Normal bowel sounds. No masses.   Extremities Moves all extremities well. No edema   Psychiatric Normal mood and affect.       Impression:  1. Hypersomnia    2. Non-restorative sleep        Plan:    Good sleep hygiene measures should be maintained.  Weight loss would be beneficial in this patient who is morbidly obese with BMI 51.1.    I discussed the pathophysiology of obstructive sleep apnea with the patient.  We discussed the adverse outcomes associated with untreated sleep-disordered breathing.  We discussed treatment modalities of obstructive sleep apnea including CPAP device as well as oral mandibular advancement device. Sleep study will be scheduled to establish definitive diagnosis of sleep disorder breathing.  Weight loss will be strongly beneficial in order to reduce the severity of sleep-disordered breathing.  Patient has narrow oropharyngeal structure.  Caution during activities that require prolonged concentration is strongly advised.  Patient will be notified of sleep study results after sleep study is completed.  If sleep apnea is only mild,  oral mandibular advancement device may be one of the treatment options.  However if sleep apnea is " moderately severe, CPAP treatment will be strongly encouraged.  The patient is not opposed to treatment with CPAP device if we confirm significant obstructive sleep apnea on polysomnography.     Thank you for allowing me to participate in your patient's care.    Jose Bell MD  Sleep Medicine  03/27/19  10:32 AM

## 2019-04-01 RX ORDER — MONTELUKAST SODIUM 10 MG/1
TABLET ORAL
Qty: 90 TABLET | Refills: 1 | Status: SHIPPED | OUTPATIENT
Start: 2019-04-01 | End: 2019-09-22 | Stop reason: SDUPTHER

## 2019-04-01 RX ORDER — FEXOFENADINE HCL 180 MG/1
TABLET ORAL
Qty: 90 TABLET | Refills: 1 | Status: SHIPPED | OUTPATIENT
Start: 2019-04-01 | End: 2019-09-22 | Stop reason: SDUPTHER

## 2019-05-15 ENCOUNTER — APPOINTMENT (OUTPATIENT)
Dept: SLEEP MEDICINE | Facility: HOSPITAL | Age: 29
End: 2019-05-15

## 2019-06-20 ENCOUNTER — HOSPITAL ENCOUNTER (OUTPATIENT)
Dept: SLEEP MEDICINE | Facility: HOSPITAL | Age: 29
Discharge: HOME | End: 2019-06-20
Admitting: FAMILY MEDICINE

## 2019-06-20 DIAGNOSIS — G47.8 NON-RESTORATIVE SLEEP: ICD-10-CM

## 2019-06-20 DIAGNOSIS — G47.10 HYPERSOMNIA: ICD-10-CM

## 2019-06-20 PROCEDURE — 95806 SLEEP STUDY UNATT&RESP EFFT: CPT

## 2019-06-26 PROCEDURE — 95806 SLEEP STUDY UNATT&RESP EFFT: CPT | Performed by: FAMILY MEDICINE

## 2019-06-27 ENCOUNTER — TELEPHONE (OUTPATIENT)
Dept: SLEEP MEDICINE | Facility: HOSPITAL | Age: 29
End: 2019-06-27

## 2019-06-27 NOTE — TELEPHONE ENCOUNTER
Tech called pts mobile #, pt answered. Tech went over study results and Dr's impression and pt verbalized her understanding. Patient confirmed DME of [facility] and scheduled F/U appt. Mailed results letter. MAB

## 2019-07-30 ENCOUNTER — OFFICE VISIT (OUTPATIENT)
Dept: FAMILY MEDICINE CLINIC | Facility: CLINIC | Age: 29
End: 2019-07-30

## 2019-07-30 VITALS
DIASTOLIC BLOOD PRESSURE: 84 MMHG | HEIGHT: 69 IN | WEIGHT: 293 LBS | SYSTOLIC BLOOD PRESSURE: 128 MMHG | BODY MASS INDEX: 43.4 KG/M2 | OXYGEN SATURATION: 96 % | HEART RATE: 92 BPM | RESPIRATION RATE: 16 BRPM | TEMPERATURE: 98 F

## 2019-07-30 DIAGNOSIS — F32.A DEPRESSION, UNSPECIFIED DEPRESSION TYPE: Primary | ICD-10-CM

## 2019-07-30 DIAGNOSIS — F41.9 ANXIETY: ICD-10-CM

## 2019-07-30 PROCEDURE — 90715 TDAP VACCINE 7 YRS/> IM: CPT | Performed by: NURSE PRACTITIONER

## 2019-07-30 PROCEDURE — 90471 IMMUNIZATION ADMIN: CPT | Performed by: NURSE PRACTITIONER

## 2019-07-30 PROCEDURE — 99212 OFFICE O/P EST SF 10 MIN: CPT | Performed by: NURSE PRACTITIONER

## 2019-07-30 RX ORDER — MULTIVITAMIN
TABLET ORAL
COMMUNITY

## 2019-07-30 RX ORDER — ALBUTEROL SULFATE 90 UG/1
2 AEROSOL, METERED RESPIRATORY (INHALATION)
COMMUNITY
End: 2020-12-09 | Stop reason: SDUPTHER

## 2019-07-30 RX ORDER — CALCIUM CARBONATE 200(500)MG
1 TABLET,CHEWABLE ORAL
COMMUNITY
End: 2021-06-07

## 2019-07-30 RX ORDER — TETRACYCLINE HCL 500 MG
CAPSULE ORAL
COMMUNITY
End: 2020-01-30

## 2019-07-30 NOTE — PROGRESS NOTES
Subjective   Lanie Moctezuma is a 29 y.o. female rpesents for six month follow up for anxiety. Doing well overall. Appears happy. States she is no longer taking antidepressants, but continuing therapy every 2-3 weeks and doing well. She is seeing Dr. Drake, taking klonopin only for acute episodes. She seems to notice triggers are grocery shopping. No acute concerns.     Anxiety   Presents for follow-up visit. Symptoms include excessive worry (occasionally), nervous/anxious behavior (with occasional panic, well managed currently) and panic (rarely). Patient reports no chest pain, decreased concentration, depressed mood, insomnia or irritability. Symptoms occur occasionally. The severity of symptoms is moderate. The quality of sleep is good. Nighttime awakenings: occasional.     Compliance with medications: takes as needed.        The following portions of the patient's history were reviewed and updated as appropriate: allergies, current medications, past family history, past medical history, past social history, past surgical history and problem list.    Review of Systems   Constitutional: Negative.  Negative for irritability.   HENT: Negative.    Eyes: Negative.    Respiratory: Negative.    Cardiovascular: Negative.  Negative for chest pain.   Gastrointestinal: Negative.    Endocrine: Negative.    Genitourinary: Negative.    Musculoskeletal: Negative.    Skin: Negative.    Allergic/Immunologic: Negative.    Neurological: Negative.    Hematological: Negative.    Psychiatric/Behavioral: Negative for decreased concentration. The patient is nervous/anxious (with occasional panic, well managed currently). The patient does not have insomnia.        Objective   Physical Exam   Constitutional: She is oriented to person, place, and time. She appears well-developed and well-nourished. No distress.   Neck: Neck supple.   Cardiovascular: Normal rate, regular rhythm and normal heart sounds. Exam reveals no gallop and no  friction rub.   No murmur heard.  Pulmonary/Chest: Effort normal and breath sounds normal. No respiratory distress. She has no wheezes. She has no rales.   Abdominal: Soft. Bowel sounds are normal. She exhibits no distension. There is no tenderness.   Neurological: She is alert and oriented to person, place, and time.   Skin: Skin is warm and dry. She is not diaphoretic.   Psychiatric: She has a normal mood and affect.   Vitals reviewed.      Assessment/Plan   Lanie was seen today for anxiety.    Diagnoses and all orders for this visit:    Depression, unspecified depression type    Anxiety    Other orders  -     Tdap Vaccine Greater Than or Equal To 8yo IM          Will update tetanus, labs at next visit in six months for physical  Follow up for any changes or as needed

## 2019-09-11 ENCOUNTER — APPOINTMENT (OUTPATIENT)
Dept: SLEEP MEDICINE | Facility: HOSPITAL | Age: 29
End: 2019-09-11

## 2019-09-11 ENCOUNTER — OFFICE VISIT (OUTPATIENT)
Dept: SLEEP MEDICINE | Facility: HOSPITAL | Age: 29
End: 2019-09-11

## 2019-09-11 VITALS
OXYGEN SATURATION: 97 % | SYSTOLIC BLOOD PRESSURE: 136 MMHG | WEIGHT: 293 LBS | BODY MASS INDEX: 43.4 KG/M2 | HEIGHT: 69 IN | DIASTOLIC BLOOD PRESSURE: 74 MMHG | HEART RATE: 83 BPM

## 2019-09-11 DIAGNOSIS — G47.33 OBSTRUCTIVE SLEEP APNEA SYNDROME: Primary | ICD-10-CM

## 2019-09-11 PROCEDURE — 99213 OFFICE O/P EST LOW 20 MIN: CPT | Performed by: FAMILY MEDICINE

## 2019-09-11 PROCEDURE — G0463 HOSPITAL OUTPT CLINIC VISIT: HCPCS

## 2019-09-11 NOTE — PROGRESS NOTES
Follow Up Sleep Disorders Center Note     Chief Complaint:  SOFI     Primary Care Physician: Joanne Urbina APRN    Lanie Moctezuma is a 29 y.o.female  was last seen at [facility] sleep lab: June 21, 2019 for home sleep study where patient was found to have total AHI of 8.1 events per hour.  Patient was started on auto CPAP 6-12 cm H2O.  She presents today for first follow-up visit.  Reports she is sleeping well and feels great improvement in her hypersomnia nonrestorative sleep.  She reports that sometimes after about 5 hours she will pull off the mask without realizing it.    Results Review:  DME [facility].  Downloads between June 13, 2019 to September 10, 2019.  Average usage is 5 hours 9 minutes.  Average AHI is 0.2.  Average AutoCPAP pressure is 7.6 cm H2O.    Current Medications:    Current Outpatient Medications:   •  albuterol sulfate  (90 Base) MCG/ACT inhaler, Inhale 2 puffs., Disp: , Rfl:   •  Apple Cider Vinegar 500 MG tablet, , Disp: , Rfl:   •  calcium carbonate (TUMS) 500 MG chewable tablet, Chew 1 tablet., Disp: , Rfl:   •  calcium polycarbophil (FIBERCON) 625 MG tablet, Take 1 capsule by mouth Daily., Disp: , Rfl:   •  calcium polycarbophil (FIBERCON) 625 MG tablet, Take 1 tablet by mouth Daily., Disp: , Rfl:   •  Cholecalciferol (VITAMIN D3) 1000 units capsule, Take 4,000 Units by mouth., Disp: , Rfl:   •  cholecalciferol (VITAMIN D3) 94140 units capsule, , Disp: , Rfl:   •  clonazePAM (KlonoPIN) 0.5 MG tablet, Take 0.5 mg by mouth 2 (Two) Times a Day As Needed for Seizures., Disp: , Rfl:   •  colesevelam (WELCHOL) 625 MG tablet, Take 1,250 mg by mouth 2 (Two) Times a Day., Disp: , Rfl: 6  •  fexofenadine (ALLEGRA) 180 MG tablet, TAKE 1 TABLET BY MOUTH EVERY DAY, Disp: 90 tablet, Rfl: 1  •  fluticasone (FLONASE) 50 MCG/ACT nasal spray, 2 sprays into the nostril(s) as directed by provider Daily., Disp: 3 bottle, Rfl: 3  •  montelukast (SINGULAIR) 10 MG tablet, TAKE 1 TABLET BY MOUTH  "EVERY DAY AT NIGHT, Disp: 90 tablet, Rfl: 1  •  Multiple Vitamin (MULTI-VITAMIN DAILY) tablet, , Disp: , Rfl:   •  Multiple Vitamins-Minerals (HAIR SKIN AND NAILS FORMULA PO), , Disp: , Rfl:   •  Probiotic Product (PROBIOTIC-10) capsule, Take 1 capsule by mouth Daily., Disp: , Rfl:    also entered in Sleep Questionnaire    Patient  has a past medical history of PHN (postherpetic neuralgia) (2/28/2017) and Shingles.    Social History:    Social History     Socioeconomic History   • Marital status: Single     Spouse name: Not on file   • Number of children: Not on file   • Years of education: Not on file   • Highest education level: Not on file   Tobacco Use   • Smoking status: Never Smoker   • Smokeless tobacco: Never Used   Substance and Sexual Activity   • Alcohol use: Defer   • Drug use: No   • Sexual activity: Defer       Allergies:  Doxycycline    Review of Systems:    A complete review of systems was done and all were negative with the exception of all negative    Vital Signs:    Vitals:    09/11/19 1122   BP: 136/74   Pulse: 83   SpO2: 97%   Weight: (!) 156 kg (343 lb)   Height: 175.3 cm (69\")     Body mass index is 50.65 kg/m².    Vital Signs /74   Pulse 83   Ht 175.3 cm (69\")   Wt (!) 156 kg (343 lb)   SpO2 97%   BMI 50.65 kg/m²  Body mass index is 50.65 kg/m².    General Alert and oriented. No acute distress noted   Pharynx/Throat Class IV Mallampati airway, large tongue, no evidence of redundant lateral pharyngeal tissue. No oral lesions. No thrush. Moist mucous membranes.   Head Normocephalic. Symmetrical. Atraumatic.    Nose No septal deviation. No drainage   Chest Wall Normal shape. Symmetric expansion with respiration. No tenderness.   Neck Trachea midline, no thyromegaly or adenopathy    Lungs Clear to auscultation bilaterally. No wheezes. No rhonchi. No rales. Respirations regular, even and unlabored.   Heart Regular rhythm and normal rate. Normal S1 and S2. No murmur   Abdomen Soft, " non-tender and non-distended. Normal bowel sounds. No masses.   Extremities Moves all extremities well. No edema   Psychiatric Normal mood and affect.     Impression:  1. Obstructive sleep apnea syndrome        Obstructive sleep apnea adequately treated with auto CPAP 6-12 cm H2O with good compliance and usage and no complaints of hypersomnolence.    Patient uses the CPAP device and benefits from its use in terms of reduction of hypersomnia and snoring.Weight loss will be strongly beneficial to reduce the severity of sleep-disordered breathing.  Caution during activities that require prolonged concentration is strongly advised if sleepiness returns. Changing of PAP supplies regularly is important for effective use. Patient needs to change cushion on the mask or plugs on nasal pillows along with disposable filters once every month and change mask frame, tubing, headgear and Velcro straps every 6 months at the minimum.    Discussed working on acclimatization techniques so this will help prevent her pulling off mask without realizing it.  Return to clinic in 3 months for follow-up on compliance or sooner if needed.      Jose Bell MD  Sleep Medicine  09/11/19  11:42 AM

## 2019-09-23 RX ORDER — MONTELUKAST SODIUM 10 MG/1
TABLET ORAL
Qty: 90 TABLET | Refills: 1 | Status: SHIPPED | OUTPATIENT
Start: 2019-09-23 | End: 2020-03-26

## 2019-09-23 RX ORDER — FEXOFENADINE HCL 180 MG/1
TABLET ORAL
Qty: 90 TABLET | Refills: 1 | Status: SHIPPED | OUTPATIENT
Start: 2019-09-23 | End: 2020-03-26

## 2019-12-11 ENCOUNTER — APPOINTMENT (OUTPATIENT)
Dept: SLEEP MEDICINE | Facility: HOSPITAL | Age: 29
End: 2019-12-11

## 2019-12-18 ENCOUNTER — OFFICE VISIT (OUTPATIENT)
Dept: SLEEP MEDICINE | Facility: HOSPITAL | Age: 29
End: 2019-12-18

## 2019-12-18 VITALS
HEART RATE: 86 BPM | WEIGHT: 293 LBS | DIASTOLIC BLOOD PRESSURE: 75 MMHG | SYSTOLIC BLOOD PRESSURE: 125 MMHG | HEIGHT: 69 IN | OXYGEN SATURATION: 97 % | BODY MASS INDEX: 43.4 KG/M2

## 2019-12-18 DIAGNOSIS — G47.33 OBSTRUCTIVE SLEEP APNEA SYNDROME: Primary | ICD-10-CM

## 2019-12-18 PROCEDURE — G0463 HOSPITAL OUTPT CLINIC VISIT: HCPCS

## 2019-12-18 PROCEDURE — 99213 OFFICE O/P EST LOW 20 MIN: CPT | Performed by: FAMILY MEDICINE

## 2019-12-18 NOTE — PROGRESS NOTES
Follow Up [facility] Note     Chief Complaint:  SOFI     Primary Care Physician: Joanne Urbina APRN    Lanie Moctezuma is a 29 y.o.female  was last seen at [facility] sleep lab: 9/11/2019 for first follow-up visit is getting set up on auto CPAP.  Sleep study in June 2019 showed AHI of 8.1 events per hour.  At last visit she reported improvement in her hypersomnia nonrestorative sleep however would pull off mask sometimes after about 5 hours without realizing it.  At last visit we discussed acclimatization techniques.  She presents today for follow-up.    Today is reported that she will wake up from air in her belly.  This can be very uncomfortable when she wakes up.  Once had nausea related to the air in her belly as well.    Results Review:  DME is [facility].  Downloads between 9/19/2019.  Average usage is 5 hours 4 minutes.  Average AHI is 0.1.  Average AutoCPAP pressure is 7.4 cm H2O.    Current Medications:    Current Outpatient Medications:   •  albuterol sulfate  (90 Base) MCG/ACT inhaler, Inhale 2 puffs., Disp: , Rfl:   •  Apple Cider Vinegar 500 MG tablet, , Disp: , Rfl:   •  calcium carbonate (TUMS) 500 MG chewable tablet, Chew 1 tablet., Disp: , Rfl:   •  calcium polycarbophil (FIBERCON) 625 MG tablet, Take 1 capsule by mouth Daily., Disp: , Rfl:   •  calcium polycarbophil (FIBERCON) 625 MG tablet, Take 1 tablet by mouth Daily., Disp: , Rfl:   •  Cholecalciferol (VITAMIN D3) 1000 units capsule, Take 4,000 Units by mouth., Disp: , Rfl:   •  cholecalciferol (VITAMIN D3) 09439 units capsule, , Disp: , Rfl:   •  clonazePAM (KlonoPIN) 0.5 MG tablet, Take 0.5 mg by mouth 2 (Two) Times a Day As Needed for Seizures., Disp: , Rfl:   •  colesevelam (WELCHOL) 625 MG tablet, Take 1,250 mg by mouth 2 (Two) Times a Day., Disp: , Rfl: 6  •  fexofenadine (ALLEGRA) 180 MG tablet, TAKE 1 TABLET BY MOUTH EVERY DAY, Disp: 90 tablet, Rfl: 1  •  fluticasone (FLONASE) 50 MCG/ACT nasal spray, 2 sprays  "into the nostril(s) as directed by provider Daily., Disp: 3 bottle, Rfl: 3  •  montelukast (SINGULAIR) 10 MG tablet, TAKE 1 TABLET BY MOUTH EVERY DAY AT NIGHT, Disp: 90 tablet, Rfl: 1  •  Multiple Vitamin (MULTI-VITAMIN DAILY) tablet, , Disp: , Rfl:   •  Multiple Vitamins-Minerals (HAIR SKIN AND NAILS FORMULA PO), , Disp: , Rfl:   •  Probiotic Product (PROBIOTIC-10) capsule, Take 1 capsule by mouth Daily., Disp: , Rfl:    also entered in Sleep Questionnaire    Patient  has a past medical history of PHN (postherpetic neuralgia) (2/28/2017) and Shingles.    Social History:    Social History     Socioeconomic History   • Marital status: Single     Spouse name: Not on file   • Number of children: Not on file   • Years of education: Not on file   • Highest education level: Not on file   Tobacco Use   • Smoking status: Never Smoker   • Smokeless tobacco: Never Used   Substance and Sexual Activity   • Alcohol use: Defer   • Drug use: No   • Sexual activity: Defer       Allergies:  Doxycycline    Review of Systems:    A complete review of systems was done and all were negative with the exception of postnasal drip nasal congestion anxiety shortness of breath    Vital Signs:    Vitals:    12/18/19 1124   BP: 125/75   Pulse: 86   SpO2: 97%   Weight: (!) 160 kg (353 lb)   Height: 175.3 cm (69\")     Body mass index is 52.13 kg/m².    Vital Signs /75   Pulse 86   Ht 175.3 cm (69\")   Wt (!) 160 kg (353 lb)   SpO2 97%   BMI 52.13 kg/m²  Body mass index is 52.13 kg/m².    General Alert and oriented. No acute distress noted   Pharynx/Throat Class IV Mallampati airway, large tongue, no evidence of redundant lateral pharyngeal tissue. No oral lesions. No thrush. Moist mucous membranes.   Head Normocephalic. Symmetrical. Atraumatic.    Nose No septal deviation. No drainage   Chest Wall Normal shape. Symmetric expansion with respiration. No tenderness.   Neck Trachea midline, no thyromegaly or adenopathy    Lungs Clear to " auscultation bilaterally. No wheezes. No rhonchi. No rales. Respirations regular, even and unlabored.   Heart Regular rhythm and normal rate. Normal S1 and S2. No murmur   Abdomen Soft, non-tender and non-distended. Normal bowel sounds. No masses.   Extremities Moves all extremities well. No edema   Psychiatric Normal mood and affect.     Impression:  1. Obstructive sleep apnea syndrome        Obstructive sleep apnea adequately treated with auto CPAP with good compliance and usage and no complaints of hypersomnolence.  We will change to set pressure of 6 cm H2O.  This should help with air in the belly.  Return to clinic in 6 weeks or follow-up or sooner if needed.  Not interested in doing a mask fitting today for nasal mask with chinstrap.  Can consider oral mandibular device in the future if still having issues.    Patient uses the CPAP device and benefits from its use in terms of reduction of hypersomnia and snoring.Weight loss will be strongly beneficial to reduce the severity of sleep-disordered breathing.  Caution during activities that require prolonged concentration is strongly advised if sleepiness returns. Changing of PAP supplies regularly is important for effective use. Patient needs to change cushion on the mask or plugs on nasal pillows along with disposable filters once every month and change mask frame, tubing, headgear and Velcro straps every 6 months at the minimum.    Jose Bell MD  Sleep Medicine  12/18/19  11:28 AM

## 2020-01-29 ENCOUNTER — OFFICE VISIT (OUTPATIENT)
Dept: SLEEP MEDICINE | Facility: HOSPITAL | Age: 30
End: 2020-01-29

## 2020-01-29 VITALS
BODY MASS INDEX: 43.4 KG/M2 | OXYGEN SATURATION: 97 % | SYSTOLIC BLOOD PRESSURE: 138 MMHG | HEIGHT: 69 IN | WEIGHT: 293 LBS | HEART RATE: 92 BPM | DIASTOLIC BLOOD PRESSURE: 73 MMHG

## 2020-01-29 DIAGNOSIS — G47.33 OBSTRUCTIVE SLEEP APNEA SYNDROME: Primary | ICD-10-CM

## 2020-01-29 PROCEDURE — G0463 HOSPITAL OUTPT CLINIC VISIT: HCPCS

## 2020-01-29 PROCEDURE — 99213 OFFICE O/P EST LOW 20 MIN: CPT | Performed by: FAMILY MEDICINE

## 2020-01-29 NOTE — PROGRESS NOTES
Follow Up [facility] Disorders Center Note     Chief Complaint:  SOFI     Primary Care Physician: Joanne Urbina APRN    Lanie Moctezuma is a 29 y.o.female  was last seen at [facility] sleep lab: 12/18/2019.  Sleep study in June 2019 showed overall AHI of 8.1 events per hour.  At first she was having issues with pulling off the mask without realizing it.  In visit in December she noted that she will wake up with the air in her belly which can be very uncomfortable.  Once had nausea related to this as well.  At last visit we changed to a set pressure of 6 cm H2O.  She presents today for 6-week follow-up.  At last visit she was not interested in doing a mask fitting.    Feels since lowering pressure she is doing a lot better with no abdominal bloating/irritability.  Since then she had a secondary issue involving prolonged menstrual bleeding which is contributing to any residual hypersomnia.  Has been bleeding for about 8 weeks.  Was seen by gynecology a few days ago and started on a new OCP.  Patient feels like over the past few days bleeding has lightened just a little bit and she is helping start to get better.  She is also on iron supplements.    Results Review:  DME is [facility].  Downloads between 10/30/2019-1/27/2020.  Average usage is 4 hours 48 minutes.  Average AHI is 0.1.  Average AutoCPAP pressure is 6.0 cm H2O.    Current Medications:    Current Outpatient Medications:   •  albuterol sulfate  (90 Base) MCG/ACT inhaler, Inhale 2 puffs., Disp: , Rfl:   •  Apple Cider Vinegar 500 MG tablet, , Disp: , Rfl:   •  calcium carbonate (TUMS) 500 MG chewable tablet, Chew 1 tablet., Disp: , Rfl:   •  calcium polycarbophil (FIBERCON) 625 MG tablet, Take 1 capsule by mouth Daily., Disp: , Rfl:   •  calcium polycarbophil (FIBERCON) 625 MG tablet, Take 1 tablet by mouth Daily., Disp: , Rfl:   •  Cholecalciferol (VITAMIN D3) 1000 units capsule, Take 4,000 Units by mouth., Disp: , Rfl:   •  cholecalciferol (VITAMIN  "D3) 35164 units capsule, , Disp: , Rfl:   •  clonazePAM (KlonoPIN) 0.5 MG tablet, Take 0.5 mg by mouth 2 (Two) Times a Day As Needed for Seizures., Disp: , Rfl:   •  colesevelam (WELCHOL) 625 MG tablet, Take 1,250 mg by mouth 2 (Two) Times a Day., Disp: , Rfl: 6  •  fexofenadine (ALLEGRA) 180 MG tablet, TAKE 1 TABLET BY MOUTH EVERY DAY, Disp: 90 tablet, Rfl: 1  •  fluticasone (FLONASE) 50 MCG/ACT nasal spray, 2 sprays into the nostril(s) as directed by provider Daily., Disp: 3 bottle, Rfl: 3  •  montelukast (SINGULAIR) 10 MG tablet, TAKE 1 TABLET BY MOUTH EVERY DAY AT NIGHT, Disp: 90 tablet, Rfl: 1  •  Multiple Vitamin (MULTI-VITAMIN DAILY) tablet, , Disp: , Rfl:   •  Multiple Vitamins-Minerals (HAIR SKIN AND NAILS FORMULA PO), , Disp: , Rfl:   •  Probiotic Product (PROBIOTIC-10) capsule, Take 1 capsule by mouth Daily., Disp: , Rfl:    also entered in Sleep Questionnaire    Patient  has a past medical history of PHN (postherpetic neuralgia) (2/28/2017) and Shingles.    Social History:    Social History     Socioeconomic History   • Marital status: Single     Spouse name: Not on file   • Number of children: Not on file   • Years of education: Not on file   • Highest education level: Not on file   Tobacco Use   • Smoking status: Never Smoker   • Smokeless tobacco: Never Used   Substance and Sexual Activity   • Alcohol use: Defer   • Drug use: No   • Sexual activity: Defer       Allergies:  Doxycycline    Review of Systems:    A complete review of systems was done and all were negative with the exception of anxiety     Vital Signs:    Vitals:    01/29/20 1119   BP: 138/73   Pulse: 92   SpO2: 97%   Weight: (!) 157 kg (347 lb)   Height: 175.3 cm (69\")     Body mass index is 51.24 kg/m².    Vital Signs /73   Pulse 92   Ht 175.3 cm (69\")   Wt (!) 157 kg (347 lb)   SpO2 97%   BMI 51.24 kg/m²  Body mass index is 51.24 kg/m².    General Alert and oriented. No acute distress noted   Pharynx/Throat Class IV " Mallampati airway, large tongue, no evidence of redundant lateral pharyngeal tissue. No oral lesions. No thrush. Moist mucous membranes.   Head Normocephalic. Symmetrical. Atraumatic.    Nose No septal deviation. No drainage   Chest Wall Normal shape. Symmetric expansion with respiration. No tenderness.   Neck Trachea midline, no thyromegaly or adenopathy    Lungs Clear to auscultation bilaterally. No wheezes. No rhonchi. No rales. Respirations regular, even and unlabored.   Heart Regular rhythm and normal rate. Normal S1 and S2. No murmur   Abdomen Soft, non-tender and non-distended. Normal bowel sounds. No masses.   Extremities Moves all extremities well. No edema   Psychiatric Normal mood and affect.     Impression:  1. Obstructive sleep apnea syndrome        Obstructive sleep apnea adequately treated with CPAP 6 cm H2O with good compliance and usage.  Started a new birth control pill a few days ago which is started to lighten her menstrual bleeding.  She is hoping this will help.  Is on iron supplements.  Continue plan of care per gynecology and primary care.  Return to clinic in 3 months for follow-up or sooner if needed.    Patient uses the CPAP device and benefits from its use in terms of reduction of hypersomnia and snoring.Weight loss will be strongly beneficial to reduce the severity of sleep-disordered breathing.  Caution during activities that require prolonged concentration is strongly advised if sleepiness returns. Changing of PAP supplies regularly is important for effective use. Patient needs to change cushion on the mask or plugs on nasal pillows along with disposable filters once every month and change mask frame, tubing, headgear and Velcro straps every 6 months at the minimum.    Jose Bell MD  Sleep Medicine  01/29/20  11:30 AM

## 2020-01-30 ENCOUNTER — OFFICE VISIT (OUTPATIENT)
Dept: FAMILY MEDICINE CLINIC | Facility: CLINIC | Age: 30
End: 2020-01-30

## 2020-01-30 VITALS
TEMPERATURE: 98.6 F | BODY MASS INDEX: 43.4 KG/M2 | HEIGHT: 69 IN | HEART RATE: 116 BPM | SYSTOLIC BLOOD PRESSURE: 146 MMHG | DIASTOLIC BLOOD PRESSURE: 98 MMHG | OXYGEN SATURATION: 98 % | WEIGHT: 293 LBS

## 2020-01-30 DIAGNOSIS — N92.1 MENORRHAGIA WITH IRREGULAR CYCLE: Primary | ICD-10-CM

## 2020-01-30 DIAGNOSIS — R53.83 FATIGUE, UNSPECIFIED TYPE: ICD-10-CM

## 2020-01-30 DIAGNOSIS — R06.02 SHORTNESS OF BREATH: ICD-10-CM

## 2020-01-30 DIAGNOSIS — Z79.899 MEDICATION MANAGEMENT: ICD-10-CM

## 2020-01-30 DIAGNOSIS — N94.6 DYSMENORRHEA: ICD-10-CM

## 2020-01-30 PROCEDURE — 99214 OFFICE O/P EST MOD 30 MIN: CPT | Performed by: NURSE PRACTITIONER

## 2020-01-30 RX ORDER — ONDANSETRON 4 MG/1
TABLET, FILM COATED ORAL
COMMUNITY
Start: 2019-12-30 | End: 2021-06-18 | Stop reason: SDUPTHER

## 2020-01-30 RX ORDER — TRANEXAMIC ACID 650 MG/1
TABLET ORAL
COMMUNITY
Start: 2020-01-27 | End: 2020-06-22

## 2020-01-31 LAB
25(OH)D3+25(OH)D2 SERPL-MCNC: 63.2 NG/ML (ref 30–100)
ALBUMIN SERPL-MCNC: 4.5 G/DL (ref 3.5–5.2)
ALBUMIN/GLOB SERPL: 1.6 G/DL
ALP SERPL-CCNC: 103 U/L (ref 39–117)
ALT SERPL-CCNC: 23 U/L (ref 1–33)
AST SERPL-CCNC: 12 U/L (ref 1–32)
BASOPHILS # BLD AUTO: 0.08 10*3/MM3 (ref 0–0.2)
BASOPHILS NFR BLD AUTO: 0.7 % (ref 0–1.5)
BILIRUB SERPL-MCNC: 0.2 MG/DL (ref 0.2–1.2)
BUN SERPL-MCNC: 11 MG/DL (ref 6–20)
BUN/CREAT SERPL: 11.8 (ref 7–25)
CALCIUM SERPL-MCNC: 9.9 MG/DL (ref 8.6–10.5)
CHLORIDE SERPL-SCNC: 97 MMOL/L (ref 98–107)
CO2 SERPL-SCNC: 27 MMOL/L (ref 22–29)
CREAT SERPL-MCNC: 0.93 MG/DL (ref 0.57–1)
EOSINOPHIL # BLD AUTO: 0.14 10*3/MM3 (ref 0–0.4)
EOSINOPHIL NFR BLD AUTO: 1.3 % (ref 0.3–6.2)
ERYTHROCYTE [DISTWIDTH] IN BLOOD BY AUTOMATED COUNT: 16.9 % (ref 12.3–15.4)
GLOBULIN SER CALC-MCNC: 2.9 GM/DL
GLUCOSE SERPL-MCNC: 87 MG/DL (ref 65–99)
HCT VFR BLD AUTO: 38.2 % (ref 34–46.6)
HGB BLD-MCNC: 12.2 G/DL (ref 12–15.9)
IMM GRANULOCYTES # BLD AUTO: 0.05 10*3/MM3 (ref 0–0.05)
IMM GRANULOCYTES NFR BLD AUTO: 0.4 % (ref 0–0.5)
IRON SATN MFR SERPL: 11 % (ref 20–50)
IRON SERPL-MCNC: 55 MCG/DL (ref 37–145)
LYMPHOCYTES # BLD AUTO: 2.36 10*3/MM3 (ref 0.7–3.1)
LYMPHOCYTES NFR BLD AUTO: 21.1 % (ref 19.6–45.3)
MCH RBC QN AUTO: 24.5 PG (ref 26.6–33)
MCHC RBC AUTO-ENTMCNC: 31.9 G/DL (ref 31.5–35.7)
MCV RBC AUTO: 76.7 FL (ref 79–97)
MONOCYTES # BLD AUTO: 0.67 10*3/MM3 (ref 0.1–0.9)
MONOCYTES NFR BLD AUTO: 6 % (ref 5–12)
NEUTROPHILS # BLD AUTO: 7.88 10*3/MM3 (ref 1.7–7)
NEUTROPHILS NFR BLD AUTO: 70.5 % (ref 42.7–76)
NRBC BLD AUTO-RTO: 0 /100 WBC (ref 0–0.2)
PLATELET # BLD AUTO: 450 10*3/MM3 (ref 140–450)
POTASSIUM SERPL-SCNC: 4.7 MMOL/L (ref 3.5–5.2)
PROT SERPL-MCNC: 7.4 G/DL (ref 6–8.5)
RBC # BLD AUTO: 4.98 10*6/MM3 (ref 3.77–5.28)
SODIUM SERPL-SCNC: 139 MMOL/L (ref 136–145)
TIBC SERPL-MCNC: 513 MCG/DL
TSH SERPL DL<=0.005 MIU/L-ACNC: 0.93 UIU/ML (ref 0.27–4.2)
UIBC SERPL-MCNC: 458 MCG/DL (ref 112–346)
VIT B12 SERPL-MCNC: 1310 PG/ML (ref 211–946)
WBC # BLD AUTO: 11.18 10*3/MM3 (ref 3.4–10.8)

## 2020-02-02 DIAGNOSIS — J30.9 ATOPIC RHINITIS: ICD-10-CM

## 2020-02-03 RX ORDER — FLUTICASONE PROPIONATE 50 MCG
2 SPRAY, SUSPENSION (ML) NASAL DAILY
Qty: 48 ML | Refills: 3 | Status: SHIPPED | OUTPATIENT
Start: 2020-02-03 | End: 2021-06-01

## 2020-02-04 ENCOUNTER — OFFICE VISIT (OUTPATIENT)
Dept: FAMILY MEDICINE CLINIC | Facility: CLINIC | Age: 30
End: 2020-02-04

## 2020-02-04 VITALS
HEART RATE: 86 BPM | TEMPERATURE: 98.4 F | HEIGHT: 69 IN | BODY MASS INDEX: 43.4 KG/M2 | OXYGEN SATURATION: 96 % | WEIGHT: 293 LBS

## 2020-02-04 DIAGNOSIS — R06.02 SHORTNESS OF BREATH: ICD-10-CM

## 2020-02-04 DIAGNOSIS — R53.1 WEAKNESS: ICD-10-CM

## 2020-02-04 DIAGNOSIS — R00.2 PALPITATIONS: Primary | ICD-10-CM

## 2020-02-04 PROCEDURE — 99214 OFFICE O/P EST MOD 30 MIN: CPT | Performed by: NURSE PRACTITIONER

## 2020-02-04 PROCEDURE — 93000 ELECTROCARDIOGRAM COMPLETE: CPT | Performed by: NURSE PRACTITIONER

## 2020-02-04 NOTE — PROGRESS NOTES
Procedure     ECG 12 Lead  Date/Time: 2/4/2020 8:12 PM  Performed by: Joanne Urbina APRN  Authorized by: Joanne Urbina APRN   Previous ECG: no previous ECG available  Rhythm: sinus rhythm  BPM: 74  Conduction: conduction normal  ST Segments: ST segments normal  T Waves: T waves normal  QRS axis: normal  Other: no other findings

## 2020-02-04 NOTE — PROGRESS NOTES
"Subjective   Lanie Moctezuma is a 30 y.o. female   who presents for   Chief Complaint   Patient presents with   • Follow-up     non stop mentral bleeding [id number] and iron def.       Bleeding has stopped in past 24 hours, mucous present, still feeling light headed and dizzy, has palpitations with exertion and shortness of air, feels like she may pass out  Recent labs show a normal hgb, iron stores are decreased, started on iron supplements, increased to twice daily on Friday  Feels like she can't drive to work with her symptoms, mom is present in exam room today    Pulse 86   Temp 98.4 °F (36.9 °C)   Ht 175.3 cm (69\")   Wt (!) 159 kg (350 lb 4.8 oz)   SpO2 96%   BMI 51.73 kg/m²       History of Present Illness   Fatigue   This is a new problem. The current episode started more than 1 month ago. The problem occurs daily. The problem has been unchanged. Associated symptoms include fatigue and weakness. Pertinent negatives include no abdominal pain, anorexia, arthralgias, change in bowel habit, chest pain, chills, congestion, coughing, diaphoresis, fever, headaches, joint swelling, myalgias, nausea, neck pain, numbness, rash, sore throat, swollen glands, urinary symptoms, vertigo, visual change or vomiting. Nothing aggravates the symptoms. Treatments tried: iron supplement. The treatment provided no relief.   Palpitations    This is a new problem. The current episode started 1 to 4 weeks ago. The problem occurs intermittently. The problem has been waxing and waning. On average, each episode lasts 1 minute. Associated symptoms include anxiety, dizziness, an irregular heartbeat, near-syncope, shortness of breath and weakness. Pertinent negatives include no chest fullness, chest pain, coughing, diaphoresis, fever, malaise/fatigue, nausea, numbness or vomiting. She has tried bed rest and anxiolytics for the symptoms. The treatment provided mild relief. Risk factors include family history and obesity. Her past " medical history is significant for anxiety.       The following portions of the patient's history were reviewed and updated as appropriate: allergies, current medications, past family history, past medical history, past social history, past surgical history and problem list.    Review of Systems  Review of Systems   Constitutional: Positive for fatigue. Negative for chills, diaphoresis, fever and malaise/fatigue.   HENT: Negative for congestion and sore throat.    Respiratory: Positive for shortness of breath. Negative for cough.    Cardiovascular: Positive for palpitations and near-syncope. Negative for chest pain.   Gastrointestinal: Negative for abdominal pain, anorexia, change in bowel habit, nausea and vomiting.   Musculoskeletal: Negative for arthralgias, joint swelling, myalgias and neck pain.   Skin: Negative for rash.   Neurological: Positive for dizziness and weakness. Negative for vertigo, numbness and headaches.   Psychiatric/Behavioral: The patient is nervous/anxious.        Objective   Physical Exam  Physical Exam   Constitutional: She is oriented to person, place, and time. She appears well-developed and well-nourished. No distress.   HENT:   Mouth/Throat: Oropharynx is clear and moist and mucous membranes are normal.   Cardiovascular: Normal rate, regular rhythm and normal heart sounds. Exam reveals no gallop and no friction rub.   No murmur heard.  Pulmonary/Chest: Effort normal and breath sounds normal. No stridor. No respiratory distress. She has no wheezes. She has no rales.   Neurological: She is alert and oriented to person, place, and time.   Skin: Skin is warm and dry. She is not diaphoretic.   Psychiatric: She has a normal mood and affect.   Vitals reviewed.        Assessment/Plan   Lanie was seen today for follow-up.    Diagnoses and all orders for this visit:    Palpitations    Shortness of breath    Weakness    labs reviewed with patient  Given irregular heartbeat recently, obtain ekg,  previous ekg in 04/19  Would like to order holter and echo, hx of sleep apnea, well controlled, eval by pulmonologist prior to last visit Suspect vasovagal episode during visit, patient became light headed, bp was 96/60 after patient was lying down, repeat sitting 132/90, standing 130/90  No further dizziness reported  Will proceed with further cardiac work up, family history of arrhythmia, will await results prior to cardiac consult  Will keep off work for the next week, FMLA and STD paperwork will be completed when patient brings them in, may require additional time off, recommend increasing iron to TID since tolerating well  For acute soa, dizziness, recommend ED follow up, patient agreeable with recommendations  Sleep is decreased related to anxiety and episodes, may be contributing but do not suspect underlying issue

## 2020-02-12 ENCOUNTER — APPOINTMENT (OUTPATIENT)
Dept: CT IMAGING | Facility: HOSPITAL | Age: 30
End: 2020-02-12

## 2020-02-12 ENCOUNTER — HOSPITAL ENCOUNTER (EMERGENCY)
Facility: HOSPITAL | Age: 30
Discharge: HOME | End: 2020-02-12
Attending: EMERGENCY MEDICINE | Admitting: EMERGENCY MEDICINE

## 2020-02-12 VITALS
SYSTOLIC BLOOD PRESSURE: 149 MMHG | HEART RATE: 100 BPM | WEIGHT: 293 LBS | OXYGEN SATURATION: 94 % | TEMPERATURE: 98.3 F | HEIGHT: 69 IN | RESPIRATION RATE: 20 BRPM | BODY MASS INDEX: 43.4 KG/M2 | DIASTOLIC BLOOD PRESSURE: 84 MMHG

## 2020-02-12 DIAGNOSIS — N83.202 CYST OF LEFT OVARY: ICD-10-CM

## 2020-02-12 DIAGNOSIS — K76.9 LIVER LESION: ICD-10-CM

## 2020-02-12 DIAGNOSIS — R16.0 HEPATOMEGALY: ICD-10-CM

## 2020-02-12 DIAGNOSIS — D50.0 IRON DEFICIENCY ANEMIA DUE TO CHRONIC BLOOD LOSS: ICD-10-CM

## 2020-02-12 DIAGNOSIS — R10.32 LEFT LOWER QUADRANT ABDOMINAL PAIN: Primary | ICD-10-CM

## 2020-02-12 DIAGNOSIS — R53.83 OTHER FATIGUE: ICD-10-CM

## 2020-02-12 LAB
ALBUMIN SERPL-MCNC: 3.9 G/DL (ref 3.5–5.2)
ALBUMIN/GLOB SERPL: 1.2 G/DL
ALP SERPL-CCNC: 92 U/L (ref 39–117)
ALT SERPL W P-5'-P-CCNC: 23 U/L (ref 1–33)
ANION GAP SERPL CALCULATED.3IONS-SCNC: 11.7 MMOL/L (ref 5–15)
APTT PPP: 33 SECONDS (ref 22.7–35.4)
AST SERPL-CCNC: 11 U/L (ref 1–32)
BASOPHILS # BLD AUTO: 0.06 10*3/MM3 (ref 0–0.2)
BASOPHILS NFR BLD AUTO: 0.5 % (ref 0–1.5)
BILIRUB SERPL-MCNC: 0.2 MG/DL (ref 0.2–1.2)
BUN BLD-MCNC: 11 MG/DL (ref 6–20)
BUN/CREAT SERPL: 12.6 (ref 7–25)
CALCIUM SPEC-SCNC: 9.7 MG/DL (ref 8.6–10.5)
CHLORIDE SERPL-SCNC: 102 MMOL/L (ref 98–107)
CO2 SERPL-SCNC: 24.3 MMOL/L (ref 22–29)
CREAT BLD-MCNC: 0.87 MG/DL (ref 0.57–1)
DEPRECATED RDW RBC AUTO: 46.7 FL (ref 37–54)
EOSINOPHIL # BLD AUTO: 0.16 10*3/MM3 (ref 0–0.4)
EOSINOPHIL NFR BLD AUTO: 1.4 % (ref 0.3–6.2)
ERYTHROCYTE [DISTWIDTH] IN BLOOD BY AUTOMATED COUNT: 17 % (ref 12.3–15.4)
GFR SERPL CREATININE-BSD FRML MDRD: 76 ML/MIN/1.73
GLOBULIN UR ELPH-MCNC: 3.3 GM/DL
GLUCOSE BLD-MCNC: 87 MG/DL (ref 65–99)
HCG SERPL QL: NEGATIVE
HCT VFR BLD AUTO: 36.6 % (ref 34–46.6)
HETEROPH AB SER QL LA: NEGATIVE
HGB BLD-MCNC: 11.6 G/DL (ref 12–15.9)
IMM GRANULOCYTES # BLD AUTO: 0.06 10*3/MM3 (ref 0–0.05)
IMM GRANULOCYTES NFR BLD AUTO: 0.5 % (ref 0–0.5)
INR PPP: 0.94 (ref 0.9–1.1)
LYMPHOCYTES # BLD AUTO: 2.27 10*3/MM3 (ref 0.7–3.1)
LYMPHOCYTES NFR BLD AUTO: 20.1 % (ref 19.6–45.3)
MCH RBC QN AUTO: 24.4 PG (ref 26.6–33)
MCHC RBC AUTO-ENTMCNC: 31.7 G/DL (ref 31.5–35.7)
MCV RBC AUTO: 77.1 FL (ref 79–97)
MONOCYTES # BLD AUTO: 0.58 10*3/MM3 (ref 0.1–0.9)
MONOCYTES NFR BLD AUTO: 5.1 % (ref 5–12)
NEUTROPHILS # BLD AUTO: 8.17 10*3/MM3 (ref 1.7–7)
NEUTROPHILS NFR BLD AUTO: 72.4 % (ref 42.7–76)
NRBC BLD AUTO-RTO: 0 /100 WBC (ref 0–0.2)
PLATELET # BLD AUTO: 350 10*3/MM3 (ref 140–450)
PMV BLD AUTO: 9.5 FL (ref 6–12)
POTASSIUM BLD-SCNC: 4.8 MMOL/L (ref 3.5–5.2)
PROT SERPL-MCNC: 7.2 G/DL (ref 6–8.5)
PROTHROMBIN TIME: 12.3 SECONDS (ref 11.7–14.2)
RBC # BLD AUTO: 4.75 10*6/MM3 (ref 3.77–5.28)
SODIUM BLD-SCNC: 138 MMOL/L (ref 136–145)
WBC NRBC COR # BLD: 11.3 10*3/MM3 (ref 3.4–10.8)

## 2020-02-12 PROCEDURE — 84703 CHORIONIC GONADOTROPIN ASSAY: CPT | Performed by: EMERGENCY MEDICINE

## 2020-02-12 PROCEDURE — 93010 ELECTROCARDIOGRAM REPORT: CPT | Performed by: INTERNAL MEDICINE

## 2020-02-12 PROCEDURE — 85610 PROTHROMBIN TIME: CPT | Performed by: EMERGENCY MEDICINE

## 2020-02-12 PROCEDURE — 25010000002 IOPAMIDOL 61 % SOLUTION: Performed by: EMERGENCY MEDICINE

## 2020-02-12 PROCEDURE — 86308 HETEROPHILE ANTIBODY SCREEN: CPT | Performed by: EMERGENCY MEDICINE

## 2020-02-12 PROCEDURE — 36415 COLL VENOUS BLD VENIPUNCTURE: CPT | Performed by: EMERGENCY MEDICINE

## 2020-02-12 PROCEDURE — 93005 ELECTROCARDIOGRAM TRACING: CPT

## 2020-02-12 PROCEDURE — 80053 COMPREHEN METABOLIC PANEL: CPT | Performed by: EMERGENCY MEDICINE

## 2020-02-12 PROCEDURE — 74177 CT ABD & PELVIS W/CONTRAST: CPT

## 2020-02-12 PROCEDURE — 85025 COMPLETE CBC W/AUTO DIFF WBC: CPT | Performed by: EMERGENCY MEDICINE

## 2020-02-12 PROCEDURE — 85730 THROMBOPLASTIN TIME PARTIAL: CPT | Performed by: EMERGENCY MEDICINE

## 2020-02-12 PROCEDURE — 93005 ELECTROCARDIOGRAM TRACING: CPT | Performed by: EMERGENCY MEDICINE

## 2020-02-12 PROCEDURE — 99283 EMERGENCY DEPT VISIT LOW MDM: CPT

## 2020-02-12 RX ADMIN — IOPAMIDOL 85 ML: 612 INJECTION, SOLUTION INTRAVENOUS at 21:22

## 2020-02-13 ENCOUNTER — HOSPITAL ENCOUNTER (OUTPATIENT)
Dept: CARDIOLOGY | Facility: HOSPITAL | Age: 30
Discharge: HOME | End: 2020-02-13
Admitting: NURSE PRACTITIONER

## 2020-02-13 VITALS
DIASTOLIC BLOOD PRESSURE: 68 MMHG | SYSTOLIC BLOOD PRESSURE: 120 MMHG | BODY MASS INDEX: 43.4 KG/M2 | HEIGHT: 69 IN | WEIGHT: 293 LBS | HEART RATE: 84 BPM

## 2020-02-13 DIAGNOSIS — R00.2 PALPITATIONS: ICD-10-CM

## 2020-02-13 DIAGNOSIS — R06.02 SHORTNESS OF BREATH: ICD-10-CM

## 2020-02-13 LAB
AORTIC ROOT ANNULUS: 1.8 CM
BH CV ECHO MEAS - ACS: 2 CM
BH CV ECHO MEAS - AO MAX PG (FULL): 3.9 MMHG
BH CV ECHO MEAS - AO MAX PG: 5.7 MMHG
BH CV ECHO MEAS - AO MEAN PG (FULL): 3 MMHG
BH CV ECHO MEAS - AO MEAN PG: 4 MMHG
BH CV ECHO MEAS - AO ROOT AREA (BSA CORRECTED): 1.2
BH CV ECHO MEAS - AO ROOT AREA: 8 CM^2
BH CV ECHO MEAS - AO ROOT DIAM: 3.2 CM
BH CV ECHO MEAS - AO V2 MAX: 119 CM/SEC
BH CV ECHO MEAS - AO V2 MEAN: 92.9 CM/SEC
BH CV ECHO MEAS - AO V2 VTI: 24.3 CM
BH CV ECHO MEAS - AVA(I,A): 1.8 CM^2
BH CV ECHO MEAS - AVA(I,D): 1.8 CM^2
BH CV ECHO MEAS - AVA(V,A): 1.8 CM^2
BH CV ECHO MEAS - AVA(V,D): 1.8 CM^2
BH CV ECHO MEAS - BSA(HAYCOCK): 2.9 M^2
BH CV ECHO MEAS - BSA: 2.6 M^2
BH CV ECHO MEAS - BZI_BMI: 51.7 KILOGRAMS/M^2
BH CV ECHO MEAS - BZI_METRIC_HEIGHT: 175.3 CM
BH CV ECHO MEAS - BZI_METRIC_WEIGHT: 158.8 KG
BH CV ECHO MEAS - EDV(CUBED): 148.9 ML
BH CV ECHO MEAS - EDV(MOD-SP2): 106 ML
BH CV ECHO MEAS - EDV(MOD-SP4): 120 ML
BH CV ECHO MEAS - EDV(TEICH): 135.3 ML
BH CV ECHO MEAS - EF(CUBED): 71.2 %
BH CV ECHO MEAS - EF(MOD-BP): 60 %
BH CV ECHO MEAS - EF(MOD-SP2): 58.5 %
BH CV ECHO MEAS - EF(MOD-SP4): 62.5 %
BH CV ECHO MEAS - EF(TEICH): 62.4 %
BH CV ECHO MEAS - ESV(CUBED): 42.9 ML
BH CV ECHO MEAS - ESV(MOD-SP2): 44 ML
BH CV ECHO MEAS - ESV(MOD-SP4): 45 ML
BH CV ECHO MEAS - ESV(TEICH): 50.9 ML
BH CV ECHO MEAS - FS: 34 %
BH CV ECHO MEAS - IVS/LVPW: 1
BH CV ECHO MEAS - IVSD: 1 CM
BH CV ECHO MEAS - LAT PEAK E' VEL: 13 CM/SEC
BH CV ECHO MEAS - LV DIASTOLIC VOL/BSA (35-75): 45.8 ML/M^2
BH CV ECHO MEAS - LV MASS(C)D: 200.4 GRAMS
BH CV ECHO MEAS - LV MASS(C)DI: 76.5 GRAMS/M^2
BH CV ECHO MEAS - LV MAX PG: 1.8 MMHG
BH CV ECHO MEAS - LV MEAN PG: 1 MMHG
BH CV ECHO MEAS - LV SYSTOLIC VOL/BSA (12-30): 17.2 ML/M^2
BH CV ECHO MEAS - LV V1 MAX: 66.6 CM/SEC
BH CV ECHO MEAS - LV V1 MEAN: 53.4 CM/SEC
BH CV ECHO MEAS - LV V1 VTI: 14.3 CM
BH CV ECHO MEAS - LVIDD: 5.3 CM
BH CV ECHO MEAS - LVIDS: 3.5 CM
BH CV ECHO MEAS - LVLD AP2: 7.3 CM
BH CV ECHO MEAS - LVLD AP4: 7.1 CM
BH CV ECHO MEAS - LVLS AP2: 6.1 CM
BH CV ECHO MEAS - LVLS AP4: 6.2 CM
BH CV ECHO MEAS - LVOT AREA (M): 3.1 CM^2
BH CV ECHO MEAS - LVOT AREA: 3.1 CM^2
BH CV ECHO MEAS - LVOT DIAM: 2 CM
BH CV ECHO MEAS - LVPWD: 1 CM
BH CV ECHO MEAS - MED PEAK E' VEL: 8 CM/SEC
BH CV ECHO MEAS - MR MAX PG: 75.3 MMHG
BH CV ECHO MEAS - MR MAX VEL: 434 CM/SEC
BH CV ECHO MEAS - MV A DUR: 0.11 SEC
BH CV ECHO MEAS - MV A MAX VEL: 60 CM/SEC
BH CV ECHO MEAS - MV DEC SLOPE: 370 CM/SEC^2
BH CV ECHO MEAS - MV DEC TIME: 0.16 SEC
BH CV ECHO MEAS - MV E MAX VEL: 71.4 CM/SEC
BH CV ECHO MEAS - MV E/A: 1.2
BH CV ECHO MEAS - MV MAX PG: 2.7 MMHG
BH CV ECHO MEAS - MV MEAN PG: 1 MMHG
BH CV ECHO MEAS - MV P1/2T MAX VEL: 90 CM/SEC
BH CV ECHO MEAS - MV P1/2T: 71.2 MSEC
BH CV ECHO MEAS - MV V2 MAX: 82.7 CM/SEC
BH CV ECHO MEAS - MV V2 MEAN: 51.3 CM/SEC
BH CV ECHO MEAS - MV V2 VTI: 21.7 CM
BH CV ECHO MEAS - MVA P1/2T LCG: 2.4 CM^2
BH CV ECHO MEAS - MVA(P1/2T): 3.1 CM^2
BH CV ECHO MEAS - MVA(VTI): 2.1 CM^2
BH CV ECHO MEAS - PA MAX PG (FULL): 3.6 MMHG
BH CV ECHO MEAS - PA MAX PG: 4.4 MMHG
BH CV ECHO MEAS - PA V2 MAX: 105 CM/SEC
BH CV ECHO MEAS - PULM A REVS DUR: 0.09 SEC
BH CV ECHO MEAS - PULM A REVS VEL: 32.8 CM/SEC
BH CV ECHO MEAS - PULM DIAS VEL: 42.8 CM/SEC
BH CV ECHO MEAS - PULM S/D: 0.83
BH CV ECHO MEAS - PULM SYS VEL: 35.7 CM/SEC
BH CV ECHO MEAS - PVA(V,A): 2.7 CM^2
BH CV ECHO MEAS - PVA(V,D): 2.7 CM^2
BH CV ECHO MEAS - QP/QS: 1.2
BH CV ECHO MEAS - RV MAX PG: 0.84 MMHG
BH CV ECHO MEAS - RV MEAN PG: 0 MMHG
BH CV ECHO MEAS - RV V1 MAX: 45.7 CM/SEC
BH CV ECHO MEAS - RV V1 MEAN: 28.8 CM/SEC
BH CV ECHO MEAS - RV V1 VTI: 8.9 CM
BH CV ECHO MEAS - RVOT AREA: 6.2 CM^2
BH CV ECHO MEAS - RVOT DIAM: 2.8 CM
BH CV ECHO MEAS - SI(AO): 74.6 ML/M^2
BH CV ECHO MEAS - SI(CUBED): 40.5 ML/M^2
BH CV ECHO MEAS - SI(LVOT): 17.1 ML/M^2
BH CV ECHO MEAS - SI(MOD-SP2): 23.7 ML/M^2
BH CV ECHO MEAS - SI(MOD-SP4): 28.6 ML/M^2
BH CV ECHO MEAS - SI(TEICH): 32.2 ML/M^2
BH CV ECHO MEAS - SUP REN AO DIAM: 2.2 CM
BH CV ECHO MEAS - SV(AO): 195.4 ML
BH CV ECHO MEAS - SV(CUBED): 106 ML
BH CV ECHO MEAS - SV(LVOT): 44.9 ML
BH CV ECHO MEAS - SV(MOD-SP2): 62 ML
BH CV ECHO MEAS - SV(MOD-SP4): 75 ML
BH CV ECHO MEAS - SV(RVOT): 54.7 ML
BH CV ECHO MEAS - SV(TEICH): 84.5 ML
BH CV ECHO MEAS - TAPSE (>1.6): 2 CM2
BH CV ECHO MEASUREMENTS AVERAGE E/E' RATIO: 6.8
BH CV XLRA - RV BASE: 3.5 CM
BH CV XLRA - RV LENGTH: 6.7 CM
BH CV XLRA - RV MID: 2.7 CM
BH CV XLRA - TDI S': 14 CM/SEC
LEFT ATRIUM VOLUME INDEX: 21 ML/M2
LEFT ATRIUM VOLUME: 53 CM3
MAXIMAL PREDICTED HEART RATE: 189 BPM
SINUS: 3.2 CM
STJ: 3 CM
STRESS TARGET HR: 161 BPM

## 2020-02-13 PROCEDURE — 93306 TTE W/DOPPLER COMPLETE: CPT | Performed by: INTERNAL MEDICINE

## 2020-02-13 PROCEDURE — 93306 TTE W/DOPPLER COMPLETE: CPT
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
## 2020-02-13 NOTE — DISCHARGE INSTRUCTIONS
Your CT scan today was remarkable for a left ovarian cyst as well as an enlarged liver with some indeterminate liver lesions that may be further evaluated as outpatient by your primary care provider.

## 2020-02-20 ENCOUNTER — OFFICE VISIT (OUTPATIENT)
Dept: FAMILY MEDICINE CLINIC | Facility: CLINIC | Age: 30
End: 2020-02-20

## 2020-02-20 VITALS
TEMPERATURE: 97.8 F | HEIGHT: 69 IN | DIASTOLIC BLOOD PRESSURE: 90 MMHG | BODY MASS INDEX: 43.4 KG/M2 | SYSTOLIC BLOOD PRESSURE: 142 MMHG | HEART RATE: 87 BPM | WEIGHT: 293 LBS | RESPIRATION RATE: 16 BRPM | OXYGEN SATURATION: 99 %

## 2020-02-20 DIAGNOSIS — R53.83 FATIGUE, UNSPECIFIED TYPE: ICD-10-CM

## 2020-02-20 DIAGNOSIS — E61.1 IRON DEFICIENCY: Primary | ICD-10-CM

## 2020-02-20 DIAGNOSIS — K76.9 LESION OF LIVER GREATER THAN 1 CM IN DIAMETER WITH NO LIVER DISEASE OR HISTORY OF MALIGNANT NEOPLASM: ICD-10-CM

## 2020-02-20 PROCEDURE — 99213 OFFICE O/P EST LOW 20 MIN: CPT | Performed by: NURSE PRACTITIONER

## 2020-02-20 NOTE — PROGRESS NOTES
"Madina Moctezuma is a 30 y.o. female   who presents for   Chief Complaint   Patient presents with   • Follow-up     Disability       Feels better, near syncope yesterday during endometrial biopsy  States ended up in ED after having near syncope while getting her holter, work up was negative for acute process, of note, a liver lesion, enlarged from previous exam was found to be present  Completed echo    /90   Pulse 87   Temp 97.8 °F (36.6 °C)   Resp 16   Ht 175.3 cm (69\")   Wt (!) 161 kg (354 lb 14.4 oz)   SpO2 99%   BMI 52.41 kg/m²       History of Present Illness   Fatigue   This is a new problem. The current episode started more than 1 month ago. The problem occurs daily. The problem has been gradually improving. Associated symptoms include fatigue (gradually improving) and weakness. Pertinent negatives include no abdominal pain, anorexia, arthralgias, change in bowel habit, chest pain, chills, congestion, coughing, diaphoresis, fever, headaches, joint swelling, myalgias, nausea, neck pain, numbness, rash, sore throat, swollen glands, urinary symptoms, vertigo, visual change or vomiting. Nothing aggravates the symptoms. She has tried rest (iron supplement) for the symptoms. The treatment provided moderate relief.       The following portions of the patient's history were reviewed and updated as appropriate: allergies, current medications, past family history, past medical history, past social history, past surgical history and problem list.    Review of Systems  Review of Systems   Constitutional: Positive for fatigue (gradually improving). Negative for chills, diaphoresis and fever.   HENT: Negative.  Negative for congestion and sore throat.    Eyes: Negative.    Respiratory: Negative.  Negative for cough.    Cardiovascular: Negative.  Negative for chest pain.   Gastrointestinal: Negative.  Negative for abdominal pain, anorexia, change in bowel habit, nausea and vomiting.   Endocrine: " Negative.    Genitourinary: Positive for menstrual problem (under care of GYN, feels better, ).   Musculoskeletal: Negative.  Negative for arthralgias, joint swelling, myalgias and neck pain.   Skin: Negative.  Negative for rash.   Allergic/Immunologic: Negative.    Neurological: Positive for weakness and light-headedness (improving). Negative for vertigo, numbness and headaches.   Hematological: Negative.    Psychiatric/Behavioral: Negative.        Objective   Physical Exam  Physical Exam   Constitutional: She is oriented to person, place, and time. She appears well-developed and well-nourished. No distress.   Neck: Neck supple.   Cardiovascular: Normal rate, regular rhythm and normal heart sounds. Exam reveals no gallop and no friction rub.   No murmur heard.  Pulmonary/Chest: Effort normal and breath sounds normal. No respiratory distress. She has no wheezes. She has no rales.   Abdominal: Soft. Bowel sounds are normal. She exhibits no distension. There is no tenderness.   Neurological: She is alert and oriented to person, place, and time.   Skin: Skin is warm and dry. She is not diaphoretic.   Psychiatric: She has a normal mood and affect.   Vitals reviewed.        Assessment/Plan   Laine was seen today for follow-up.    Diagnoses and all orders for this visit:    Iron deficiency  -     CBC & Differential  -     Iron and TIBC    Lesion of liver greater than 1 cm in diameter with no liver disease or history of malignant neoplasm  -     MRI abdomen w wo contrast; Future  -     Comprehensive metabolic panel    Fatigue, unspecified type         Return to work on Monday 02/24  Symptoms improving  Will repeat labs to see if iron sat is improving, may need additional treatment with venofer for continued symptoms, would refer to hem/onc for further eval if symptoms worsen or do not improve  Will obtain liver mri based on recent exam/findings in ED  Follow up as needed or for worsening symptoms

## 2020-02-21 LAB
ALBUMIN SERPL-MCNC: 4.2 G/DL (ref 3.5–5.2)
ALBUMIN/GLOB SERPL: 1.4 G/DL
ALP SERPL-CCNC: 105 U/L (ref 39–117)
ALT SERPL-CCNC: 25 U/L (ref 1–33)
AST SERPL-CCNC: 9 U/L (ref 1–32)
BASOPHILS # BLD AUTO: 0.07 10*3/MM3 (ref 0–0.2)
BASOPHILS NFR BLD AUTO: 0.6 % (ref 0–1.5)
BILIRUB SERPL-MCNC: 0.2 MG/DL (ref 0.2–1.2)
BUN SERPL-MCNC: 10 MG/DL (ref 6–20)
BUN/CREAT SERPL: 11.5 (ref 7–25)
CALCIUM SERPL-MCNC: 9.5 MG/DL (ref 8.6–10.5)
CHLORIDE SERPL-SCNC: 98 MMOL/L (ref 98–107)
CO2 SERPL-SCNC: 25.4 MMOL/L (ref 22–29)
CREAT SERPL-MCNC: 0.87 MG/DL (ref 0.57–1)
EOSINOPHIL # BLD AUTO: 0.16 10*3/MM3 (ref 0–0.4)
EOSINOPHIL NFR BLD AUTO: 1.5 % (ref 0.3–6.2)
ERYTHROCYTE [DISTWIDTH] IN BLOOD BY AUTOMATED COUNT: 17 % (ref 12.3–15.4)
GLOBULIN SER CALC-MCNC: 3.1 GM/DL
GLUCOSE SERPL-MCNC: 85 MG/DL (ref 65–99)
HCT VFR BLD AUTO: 38.1 % (ref 34–46.6)
HGB BLD-MCNC: 12 G/DL (ref 12–15.9)
IMM GRANULOCYTES # BLD AUTO: 0.09 10*3/MM3 (ref 0–0.05)
IMM GRANULOCYTES NFR BLD AUTO: 0.8 % (ref 0–0.5)
IRON SATN MFR SERPL: 10 % (ref 20–50)
IRON SERPL-MCNC: 48 MCG/DL (ref 37–145)
LYMPHOCYTES # BLD AUTO: 2.51 10*3/MM3 (ref 0.7–3.1)
LYMPHOCYTES NFR BLD AUTO: 23 % (ref 19.6–45.3)
MCH RBC QN AUTO: 24.3 PG (ref 26.6–33)
MCHC RBC AUTO-ENTMCNC: 31.5 G/DL (ref 31.5–35.7)
MCV RBC AUTO: 77.1 FL (ref 79–97)
MONOCYTES # BLD AUTO: 0.55 10*3/MM3 (ref 0.1–0.9)
MONOCYTES NFR BLD AUTO: 5 % (ref 5–12)
NEUTROPHILS # BLD AUTO: 7.52 10*3/MM3 (ref 1.7–7)
NEUTROPHILS NFR BLD AUTO: 69.1 % (ref 42.7–76)
NRBC BLD AUTO-RTO: 0.1 /100 WBC (ref 0–0.2)
PLATELET # BLD AUTO: 396 10*3/MM3 (ref 140–450)
POTASSIUM SERPL-SCNC: 4.8 MMOL/L (ref 3.5–5.2)
PROT SERPL-MCNC: 7.3 G/DL (ref 6–8.5)
RBC # BLD AUTO: 4.94 10*6/MM3 (ref 3.77–5.28)
SODIUM SERPL-SCNC: 138 MMOL/L (ref 136–145)
TIBC SERPL-MCNC: 496 MCG/DL
UIBC SERPL-MCNC: 448 MCG/DL (ref 112–346)
WBC # BLD AUTO: 10.9 10*3/MM3 (ref 3.4–10.8)

## 2020-02-29 ENCOUNTER — HOSPITAL ENCOUNTER (OUTPATIENT)
Dept: MRI IMAGING | Facility: HOSPITAL | Age: 30
Discharge: HOME | End: 2020-02-29
Admitting: NURSE PRACTITIONER

## 2020-02-29 DIAGNOSIS — K76.9 LESION OF LIVER GREATER THAN 1 CM IN DIAMETER WITH NO LIVER DISEASE OR HISTORY OF MALIGNANT NEOPLASM: ICD-10-CM

## 2020-02-29 PROCEDURE — 0 GADOBENATE DIMEGLUMINE 529 MG/ML SOLUTION: Performed by: NURSE PRACTITIONER

## 2020-02-29 PROCEDURE — 74183 MRI ABD W/O CNTR FLWD CNTR: CPT

## 2020-02-29 PROCEDURE — A9577 INJ MULTIHANCE: HCPCS | Performed by: NURSE PRACTITIONER

## 2020-02-29 RX ADMIN — GADOBENATE DIMEGLUMINE 20 ML: 529 INJECTION, SOLUTION INTRAVENOUS at 11:07

## 2020-03-03 DIAGNOSIS — K76.9 LESION OF LIVER GREATER THAN 1 CM IN DIAMETER WITH NO LIVER DISEASE OR HISTORY OF MALIGNANT NEOPLASM: Primary | ICD-10-CM

## 2020-03-04 ENCOUNTER — TELEPHONE (OUTPATIENT)
Dept: FAMILY MEDICINE CLINIC | Facility: CLINIC | Age: 30
End: 2020-03-04

## 2020-03-05 ENCOUNTER — TELEPHONE (OUTPATIENT)
Dept: FAMILY MEDICINE CLINIC | Facility: CLINIC | Age: 30
End: 2020-03-05

## 2020-03-05 NOTE — TELEPHONE ENCOUNTER
PATIENT CALLED AND STATED HER INSURANCE IS WANTING TO DECLINE HER SHORT TERM DISABILITY. FOR TIME OFF DUE TO  VASOVAGEL SYNCOPE  THE PREVIOUS PAPERWORK SHOWED IRON DEFICIENCY FOR THE REASON.    SHE NEEDS TO HAVE UPDATED PAPERWORK , WHICH HAS BEEN FAXED TO THE OFFICE, AND ANY UPDATED LABS SINCE February 12,2020. TO SHOW FOR VASOVAGEL SYNCOPE.   PLEASE FAX [phone] CLAIM # [id number] HAS TO BE ON THERE.   ATTN: BILL.   BILL'S PHONE NUMBER [phone]    NEEDS TO BE FAXED TO BILL BY END OF DAY TODAY.

## 2020-03-06 ENCOUNTER — TELEPHONE (OUTPATIENT)
Dept: FAMILY MEDICINE CLINIC | Facility: CLINIC | Age: 30
End: 2020-03-06

## 2020-03-06 NOTE — TELEPHONE ENCOUNTER
Spoke with patient and informed her that we have yet not gotten the results from the holter monitor.

## 2020-03-06 NOTE — TELEPHONE ENCOUNTER
It is going to take time for her levels to rebound. I would give it more time and should start seeing improvement.

## 2020-03-06 NOTE — TELEPHONE ENCOUNTER
Called and informed the patient that her iron levels will rebound and nothing to worry about. Patient understood and was happy for the news.

## 2020-03-26 RX ORDER — FEXOFENADINE HCL 180 MG/1
TABLET ORAL
Qty: 90 TABLET | Refills: 1 | Status: SHIPPED | OUTPATIENT
Start: 2020-03-26 | End: 2020-09-08

## 2020-03-26 RX ORDER — MONTELUKAST SODIUM 10 MG/1
TABLET ORAL
Qty: 90 TABLET | Refills: 1 | Status: SHIPPED | OUTPATIENT
Start: 2020-03-26 | End: 2020-09-08

## 2020-04-28 ENCOUNTER — TELEPHONE (OUTPATIENT)
Dept: FAMILY MEDICINE CLINIC | Facility: CLINIC | Age: 30
End: 2020-04-28

## 2020-04-28 NOTE — TELEPHONE ENCOUNTER
PATIENT CALLED TO REPORT THAT HER MENSTRUAL CYCLE WON'T STOP. SHE'S BEEN PRESCRIBED Tranexamic Acid 650 MG tablet TO HELP WITH THE ISSUE.    PREFERRED PHARMACY IS: [facility] [address]

## 2020-05-14 ENCOUNTER — HOSPITAL ENCOUNTER (EMERGENCY)
Facility: HOSPITAL | Age: 30
Discharge: HOME | End: 2020-05-14
Attending: EMERGENCY MEDICINE | Admitting: EMERGENCY MEDICINE

## 2020-05-14 ENCOUNTER — TELEMEDICINE (OUTPATIENT)
Dept: FAMILY MEDICINE CLINIC | Facility: CLINIC | Age: 30
End: 2020-05-14

## 2020-05-14 ENCOUNTER — TELEPHONE (OUTPATIENT)
Dept: FAMILY MEDICINE CLINIC | Facility: CLINIC | Age: 30
End: 2020-05-14

## 2020-05-14 ENCOUNTER — APPOINTMENT (OUTPATIENT)
Dept: ULTRASOUND IMAGING | Facility: HOSPITAL | Age: 30
End: 2020-05-14

## 2020-05-14 VITALS
TEMPERATURE: 97.2 F | SYSTOLIC BLOOD PRESSURE: 109 MMHG | DIASTOLIC BLOOD PRESSURE: 75 MMHG | RESPIRATION RATE: 16 BRPM | OXYGEN SATURATION: 97 % | HEART RATE: 98 BPM

## 2020-05-14 DIAGNOSIS — R06.02 SHORTNESS OF BREATH: ICD-10-CM

## 2020-05-14 DIAGNOSIS — N92.1 MENORRHAGIA WITH IRREGULAR CYCLE: Primary | ICD-10-CM

## 2020-05-14 DIAGNOSIS — N83.292 COMPLEX CYST OF LEFT OVARY: ICD-10-CM

## 2020-05-14 DIAGNOSIS — R53.83 FATIGUE, UNSPECIFIED TYPE: ICD-10-CM

## 2020-05-14 DIAGNOSIS — N93.8 DYSFUNCTIONAL UTERINE BLEEDING: Primary | ICD-10-CM

## 2020-05-14 LAB
ABO GROUP BLD: NORMAL
ALBUMIN SERPL-MCNC: 4.3 G/DL (ref 3.5–5.2)
ALBUMIN/GLOB SERPL: 1.3 G/DL
ALP SERPL-CCNC: 96 U/L (ref 39–117)
ALT SERPL W P-5'-P-CCNC: 22 U/L (ref 1–33)
ANION GAP SERPL CALCULATED.3IONS-SCNC: 12.1 MMOL/L (ref 5–15)
AST SERPL-CCNC: 13 U/L (ref 1–32)
BACTERIA UR QL AUTO: ABNORMAL /HPF
BASOPHILS # BLD AUTO: 0.06 10*3/MM3 (ref 0–0.2)
BASOPHILS NFR BLD AUTO: 0.6 % (ref 0–1.5)
BILIRUB SERPL-MCNC: 0.2 MG/DL (ref 0.2–1.2)
BILIRUB UR QL STRIP: NEGATIVE
BLD GP AB SCN SERPL QL: NEGATIVE
BUN BLD-MCNC: 8 MG/DL (ref 6–20)
BUN/CREAT SERPL: 9.6 (ref 7–25)
CALCIUM SPEC-SCNC: 9.4 MG/DL (ref 8.6–10.5)
CHLORIDE SERPL-SCNC: 99 MMOL/L (ref 98–107)
CLARITY UR: CLEAR
CLUE CELLS SPEC QL WET PREP: NORMAL
CO2 SERPL-SCNC: 26.9 MMOL/L (ref 22–29)
COLOR UR: YELLOW
CREAT BLD-MCNC: 0.83 MG/DL (ref 0.57–1)
DEPRECATED RDW RBC AUTO: 48.7 FL (ref 37–54)
EOSINOPHIL # BLD AUTO: 0.17 10*3/MM3 (ref 0–0.4)
EOSINOPHIL NFR BLD AUTO: 1.7 % (ref 0.3–6.2)
ERYTHROCYTE [DISTWIDTH] IN BLOOD BY AUTOMATED COUNT: 16.8 % (ref 12.3–15.4)
GFR SERPL CREATININE-BSD FRML MDRD: 80 ML/MIN/1.73
GLOBULIN UR ELPH-MCNC: 3.2 GM/DL
GLUCOSE BLD-MCNC: 126 MG/DL (ref 65–99)
GLUCOSE UR STRIP-MCNC: NEGATIVE MG/DL
HCG SERPL QL: NEGATIVE
HCT VFR BLD AUTO: 38.7 % (ref 34–46.6)
HGB BLD-MCNC: 12.3 G/DL (ref 12–15.9)
HGB UR QL STRIP.AUTO: ABNORMAL
HYALINE CASTS UR QL AUTO: ABNORMAL /LPF
HYDATID CYST SPEC WET PREP: NORMAL
IMM GRANULOCYTES # BLD AUTO: 0.07 10*3/MM3 (ref 0–0.05)
IMM GRANULOCYTES NFR BLD AUTO: 0.7 % (ref 0–0.5)
KETONES UR QL STRIP: NEGATIVE
LEUKOCYTE ESTERASE UR QL STRIP.AUTO: NEGATIVE
LYMPHOCYTES # BLD AUTO: 2.17 10*3/MM3 (ref 0.7–3.1)
LYMPHOCYTES NFR BLD AUTO: 21.7 % (ref 19.6–45.3)
MCH RBC QN AUTO: 25.2 PG (ref 26.6–33)
MCHC RBC AUTO-ENTMCNC: 31.8 G/DL (ref 31.5–35.7)
MCV RBC AUTO: 79.3 FL (ref 79–97)
MONOCYTES # BLD AUTO: 0.38 10*3/MM3 (ref 0.1–0.9)
MONOCYTES NFR BLD AUTO: 3.8 % (ref 5–12)
NEUTROPHILS # BLD AUTO: 7.14 10*3/MM3 (ref 1.7–7)
NEUTROPHILS NFR BLD AUTO: 71.5 % (ref 42.7–76)
NITRITE UR QL STRIP: NEGATIVE
NRBC BLD AUTO-RTO: 0 /100 WBC (ref 0–0.2)
PH UR STRIP.AUTO: 6.5 [PH] (ref 5–8)
PLATELET # BLD AUTO: 351 10*3/MM3 (ref 140–450)
PMV BLD AUTO: 9.7 FL (ref 6–12)
POTASSIUM BLD-SCNC: 3.9 MMOL/L (ref 3.5–5.2)
PROT SERPL-MCNC: 7.5 G/DL (ref 6–8.5)
PROT UR QL STRIP: NEGATIVE
RBC # BLD AUTO: 4.88 10*6/MM3 (ref 3.77–5.28)
RBC # UR: ABNORMAL /HPF
REF LAB TEST METHOD: ABNORMAL
RH BLD: NEGATIVE
SODIUM BLD-SCNC: 138 MMOL/L (ref 136–145)
SP GR UR STRIP: 1.01 (ref 1–1.03)
SQUAMOUS #/AREA URNS HPF: ABNORMAL /HPF
T VAGINALIS SPEC QL WET PREP: NORMAL
T&S EXPIRATION DATE: NORMAL
UROBILINOGEN UR QL STRIP: ABNORMAL
WBC NRBC COR # BLD: 9.99 10*3/MM3 (ref 3.4–10.8)
WBC SPEC QL WET PREP: NORMAL
WBC UR QL AUTO: ABNORMAL /HPF
YEAST GENITAL QL WET PREP: NORMAL

## 2020-05-14 PROCEDURE — 80053 COMPREHEN METABOLIC PANEL: CPT | Performed by: PHYSICIAN ASSISTANT

## 2020-05-14 PROCEDURE — 85025 COMPLETE CBC W/AUTO DIFF WBC: CPT | Performed by: PHYSICIAN ASSISTANT

## 2020-05-14 PROCEDURE — 87491 CHLMYD TRACH DNA AMP PROBE: CPT | Performed by: PHYSICIAN ASSISTANT

## 2020-05-14 PROCEDURE — 76856 US EXAM PELVIC COMPLETE: CPT

## 2020-05-14 PROCEDURE — 81001 URINALYSIS AUTO W/SCOPE: CPT | Performed by: PHYSICIAN ASSISTANT

## 2020-05-14 PROCEDURE — 86900 BLOOD TYPING SEROLOGIC ABO: CPT | Performed by: PHYSICIAN ASSISTANT

## 2020-05-14 PROCEDURE — 99213 OFFICE O/P EST LOW 20 MIN: CPT | Performed by: NURSE PRACTITIONER

## 2020-05-14 PROCEDURE — 86901 BLOOD TYPING SEROLOGIC RH(D): CPT | Performed by: PHYSICIAN ASSISTANT

## 2020-05-14 PROCEDURE — 76830 TRANSVAGINAL US NON-OB: CPT

## 2020-05-14 PROCEDURE — 86850 RBC ANTIBODY SCREEN: CPT | Performed by: PHYSICIAN ASSISTANT

## 2020-05-14 PROCEDURE — 84703 CHORIONIC GONADOTROPIN ASSAY: CPT | Performed by: PHYSICIAN ASSISTANT

## 2020-05-14 PROCEDURE — 87591 N.GONORRHOEAE DNA AMP PROB: CPT | Performed by: PHYSICIAN ASSISTANT

## 2020-05-14 PROCEDURE — 99284 EMERGENCY DEPT VISIT MOD MDM: CPT

## 2020-05-14 PROCEDURE — 87210 SMEAR WET MOUNT SALINE/INK: CPT | Performed by: PHYSICIAN ASSISTANT

## 2020-05-14 RX ADMIN — SODIUM CHLORIDE, POTASSIUM CHLORIDE, SODIUM LACTATE AND CALCIUM CHLORIDE 1000 ML: 600; 310; 30; 20 INJECTION, SOLUTION INTRAVENOUS at 15:29

## 2020-05-14 NOTE — TELEPHONE ENCOUNTER
Patient's mother is calling back to speak with Regino.  Mother states that she is unable to get in with her GYN.  Mother states that patient is bleeding is vaginal and anal.  Mother states the patient is very weak.  Mother states she just needs some help and guidance with helping patient get treated.    Mother states she feels like the patient's issue is being dismissed as overweight.    Please advise.    Mother call back [phone]    Patient call back [phone]

## 2020-05-14 NOTE — ED PROVIDER NOTES
I supervised care provided by the midlevel provider.   We have discussed this patient's history, physical exam, and treatment plan.  I have reviewed the note and personally saw and examined the patient and agree with the plan of care.   Patient is very stable and in no distress.  Patient has had a long history of episodic vaginal bleeding.  Currently the bleeding is better.  History of some occasional rectal bleed with a colonoscopy recently that diagnosed that she had some internal hemorrhoids.  She is hemodynamically stable here no distress.    GENERAL: not distressed  HENT: nares patent  Head/neck/ face are symmetric without gross deformity or swelling  EYES: no scleral icterus  CV: regular rhythm, regular rate with intact distal pulses  RESPIRATORY: normal effort and no respiratory distress  ABDOMEN: soft and non-tender  MUSCULOSKELETAL: no deformity  NEURO: alert and appropriate, moves all extremities, follows commands  SKIN: warm, dry    Vital signs and nursing notes reviewed.    Plan I reviewed the lab work as well as the ultrasound report.  We will discharge the patient home and have her follow-up with her gynecologist Dr. Copeland.  [place] is going to try to get in touch with Dr. Copeland to see if she has any other recommendations that she would want to do.  On the pelvic exam performed by [place] she had very mild to minimal bleeding.  Her hemoglobin is stable at 12.3.  I be reluctant to start hormones on her at this point.  Again we will consult with gynecology to see if they have any different recommendations    We are currently under a pandemic from the COVID19 infection.  The patient presented to the emergency department by ambulance or personal vehicle.  During current hospital restrictions no other visitors were present in the emergency department during my evaluation and treatment. I followed the current protocols required by Infection Control at [facility] in my evaluation and treatment of the  patient. The patient was wearing a face mask during my evaluation and throughout my encounter. During my whole encounter with this patient I used appropriate personal protective equipment.  This equipment consisted of eye protection, facemask, gown, and gloves.  I applied this equipment before entering the room.           Jeronimo Guardado MD  05/14/20 [id number]

## 2020-05-14 NOTE — ED TRIAGE NOTES
Pt reports vaginal and rectal bleeding for 2 days. Pt reports she had it for a couple months but it stopped and started back again 2 days ago. Pt reports feeling fatigue. Patient placed in a mask at triage. Triage RN also wearing a mask.

## 2020-05-14 NOTE — ED NOTES
"Pt to ED from home for vaginal bleeding and rectal bleeding. Pt has had issues with bleeding for over 1 year. Pt reports intermittent, long menses up to 8 weeks. Pt reports taking PO tranexamic acid on 5/1/20 and bleeding stopped. Pt had onset of bleeding again two days ago (\"but I had been bleeding four weeks prior to that\"). Pt reports different color blood (bright red and dark blood) and clots in blood. Pt reports she is taking iron which makes it hard to determine stool color. Pt reports 2-3 overnight pads/day with tampons as well.    Pt symptomatic. Reports lightheadedness, SOA with exertion (walking), dizziness, subjective palpitations. Pt reports these symptoms have gotten significant over the last two days. She called PCP for telehealth visit and advised to come to ED for worsening bleeding and symptoms.    Denies exposure to COVID-19.    Melody Melgoza, NIGEL  05/14/20 1323       Melody Melgoza RN  05/14/20 1331    "

## 2020-05-14 NOTE — PATIENT INSTRUCTIONS
Proceed to ER for further evaluation with weakness, shortness of breath and heavy menstrual period.

## 2020-05-14 NOTE — PROGRESS NOTES
Subjective   Lanie Moctezuma is a 30 y.o. female   who presents for   Chief Complaint   Patient presents with   • Menorrhagia     Continued menstrual cycle, ongoing, took medication prescribed by gyn, stopped for a week and then returned. States using overnight pads and superplus tampons, has been buying three boxes at a time due to menstruation.     Reports rectal bleeding, worse with bowel movement, clotting when wiping  On iron TID, states stools are always black.     No energy, weak, fatigue,   Previously walking 2 miles a day during quarantine, had to stop walking the second consecutive week of bleeding, and had to have mother pick her up. Passing large clots.    Lives upstairs in mothers home, walked downstairs and made it to her bedroom, gets short of breath with little activity. Extremely weak and fatigued.     Headache, caused nausea. Tries to exercise and causes bleeding    Previously prescribed birth control,  Thought it may have been interacting with medication and stopped. Hx of ovarian cysts with surgery by Dr. You a year ago. States symptoms have been progressive since that time.    There were no vitals taken for this visit.      History of Present Illness   Menorrhagia   This is a recurrent problem. The current episode started more than 1 month ago. The problem occurs constantly. The problem has been unchanged. Associated symptoms include a change in bowel habit (blood in stools, previous colonoscopy), fatigue, headaches, nausea, vertigo and weakness. Pertinent negatives include no abdominal pain, anorexia, arthralgias, chest pain, chills, congestion, coughing, diaphoresis, fever, joint swelling, myalgias, neck pain, numbness, rash, sore throat, swollen glands, urinary symptoms, visual change or vomiting. Nothing aggravates the symptoms. She has tried rest (tranexamic acid, iron) for the symptoms. The treatment provided mild (tranexamic acid works for one week, then will resume) relief.    Fatigue   This is a new problem. The current episode started 1 to 4 weeks ago. The problem occurs constantly. The problem has been gradually worsening. Associated symptoms include a change in bowel habit (blood in stools, previous colonoscopy), fatigue, headaches, nausea, vertigo and weakness. Pertinent negatives include no abdominal pain, anorexia, arthralgias, chest pain, chills, congestion, coughing, diaphoresis, fever, joint swelling, myalgias, neck pain, numbness, rash, sore throat, swollen glands, urinary symptoms, visual change or vomiting. Exacerbated by: any activity. Treatments tried: iron, rest. The treatment provided no relief.       The following portions of the patient's history were reviewed and updated as appropriate: allergies, current medications, past family history, past medical history, past social history, past surgical history and problem list.    Review of Systems  Review of Systems   Constitutional: Positive for fatigue. Negative for chills, diaphoresis and fever.   HENT: Negative for congestion and sore throat.         Possibly pale gums, per mother     Respiratory: Negative for cough.    Cardiovascular: Negative for chest pain.   Gastrointestinal: Positive for change in bowel habit (blood in stools, previous colonoscopy) and nausea. Negative for abdominal pain, anorexia and vomiting.   Genitourinary: Positive for menorrhagia.   Musculoskeletal: Negative for arthralgias, joint swelling, myalgias and neck pain.   Skin: Negative for rash.        Nail beds appear darker possibly     Neurological: Positive for vertigo, weakness and headaches. Negative for numbness.       Objective   Physical Exam  Physical Exam   Constitutional: She is oriented to person, place, and time. She appears well-developed and well-nourished. No distress.   Pulmonary/Chest: Effort normal and breath sounds normal.   Neurological: She is alert and oriented to person, place, and time.   Skin: She is not diaphoretic.  There is pallor.   Psychiatric: She has a normal mood and affect.         Assessment/Plan   Lanie was seen today for menorrhagia.    Diagnoses and all orders for this visit:    Menorrhagia with irregular cycle    Fatigue, unspecified type    Shortness of breath    recommend further work up in ER given duration of menstrual cycle, weakness and shortness of breath with activity, patient appears pale, concern for anemia, previous work up, hemoglobin was wnl, iron saturation was decreased, started on iron supplements  Concern with rectal bleeding, previous colonoscopy  Will await further work up from ED, if hgb is stable refer to gyn urgently    You have chosen to receive care through a telehealth visit.  Do you consent to use a video/audio connection for your medical care today? Yes    Time spent 15 minutes
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
## 2020-05-14 NOTE — DISCHARGE INSTRUCTIONS
Keep your follow-up appointment as scheduled, however you may call for cancellations to get a sooner appointment.  Return to the emergency department for passing out, persistent severe bleeding, as needed.

## 2020-05-15 LAB
C TRACH RRNA SPEC DONR QL NAA+PROBE: NEGATIVE
N GONORRHOEA DNA SPEC QL NAA+PROBE: NEGATIVE

## 2020-05-20 ENCOUNTER — TELEPHONE (OUTPATIENT)
Dept: OBSTETRICS AND GYNECOLOGY | Age: 30
End: 2020-05-20

## 2020-05-20 NOTE — TELEPHONE ENCOUNTER
JOSE MARTIN scheduled with you 6/22/2020   Dr. Kirkpatrick sent me a message on this patient but it did not go to my actually inbox so I am just now seeing this. Pt was seen in the ER on 5/14/2020 for dysfunctional uterine bleeding. Dr. Kirkpatrick advised the bleeding was not severe and wasn't emergent but she still wanted me to send a message and see if you wanted this patient to come in sooner than scheduled. Please advise.

## 2020-05-20 NOTE — TELEPHONE ENCOUNTER
Reviewed labs and imaging - will need EMB when she comes in - does not need to be seen sooner - if possible please block 30 minutes for appt - okay if not ppossible

## 2020-06-22 ENCOUNTER — OFFICE VISIT (OUTPATIENT)
Dept: OBSTETRICS AND GYNECOLOGY | Age: 30
End: 2020-06-22

## 2020-06-22 VITALS
BODY MASS INDEX: 43.4 KG/M2 | HEIGHT: 69 IN | DIASTOLIC BLOOD PRESSURE: 86 MMHG | WEIGHT: 293 LBS | SYSTOLIC BLOOD PRESSURE: 145 MMHG

## 2020-06-22 DIAGNOSIS — Z13.9 SPECIAL SCREENING: ICD-10-CM

## 2020-06-22 DIAGNOSIS — N83.202 LEFT OVARIAN CYST: ICD-10-CM

## 2020-06-22 DIAGNOSIS — E66.01 MORBID OBESITY WITH BMI OF 50.0-59.9, ADULT (HCC): ICD-10-CM

## 2020-06-22 DIAGNOSIS — Z13.89 SCREENING FOR BLOOD OR PROTEIN IN URINE: ICD-10-CM

## 2020-06-22 DIAGNOSIS — N93.9 ABNORMAL UTERINE BLEEDING (AUB): Primary | ICD-10-CM

## 2020-06-22 PROBLEM — Z98.890 H/O OVARIAN CYSTECTOMY: Status: ACTIVE | Noted: 2020-06-22

## 2020-06-22 PROBLEM — Z87.42 H/O OVARIAN CYSTECTOMY: Status: ACTIVE | Noted: 2020-06-22

## 2020-06-22 PROBLEM — L68.0 FEMALE HIRSUTISM: Status: ACTIVE | Noted: 2020-06-22

## 2020-06-22 LAB
B-HCG UR QL: NEGATIVE
BILIRUB BLD-MCNC: NEGATIVE MG/DL
GLUCOSE UR STRIP-MCNC: NEGATIVE MG/DL
INTERNAL NEGATIVE CONTROL: NEGATIVE
INTERNAL POSITIVE CONTROL: POSITIVE
KETONES UR QL: NEGATIVE
LEUKOCYTE EST, POC: NEGATIVE
Lab: NORMAL
NITRITE UR-MCNC: NEGATIVE MG/ML
PH UR: 6 [PH] (ref 5–8)
PROT UR STRIP-MCNC: NEGATIVE MG/DL
RBC # UR STRIP: ABNORMAL /UL
SP GR UR: 1.01 (ref 1–1.03)
UROBILINOGEN UR QL: NORMAL

## 2020-06-22 PROCEDURE — 81002 URINALYSIS NONAUTO W/O SCOPE: CPT | Performed by: OBSTETRICS & GYNECOLOGY

## 2020-06-22 PROCEDURE — 99203 OFFICE O/P NEW LOW 30 MIN: CPT | Performed by: OBSTETRICS & GYNECOLOGY

## 2020-06-22 PROCEDURE — 81025 URINE PREGNANCY TEST: CPT | Performed by: OBSTETRICS & GYNECOLOGY

## 2020-06-22 RX ORDER — FERROUS SULFATE TAB EC 324 MG (65 MG FE EQUIVALENT) 324 (65 FE) MG
65 TABLET DELAYED RESPONSE ORAL 3 TIMES DAILY
COMMUNITY

## 2020-06-22 RX ORDER — DIPHENHYDRAMINE HCL 25 MG
25 TABLET ORAL EVERY 6 HOURS PRN
COMMUNITY

## 2020-06-22 RX ORDER — NORGESTIMATE AND ETHINYL ESTRADIOL 0.25-0.035
1 KIT ORAL DAILY
Qty: 28 TABLET | Refills: 12 | Status: SHIPPED | OUTPATIENT
Start: 2020-06-22 | End: 2021-06-16

## 2020-06-22 RX ORDER — ACETAMINOPHEN, ASPIRIN AND CAFFEINE 250; 250; 65 MG/1; MG/1; MG/1
1 TABLET, FILM COATED ORAL EVERY 6 HOURS PRN
COMMUNITY
End: 2023-02-13

## 2020-06-22 NOTE — PROGRESS NOTES
"Madina Moctezuma is a 30 y.o. female presents as new patient - last pap 04/16/19 neg with del serrano NP, Pt was seen in the ER on 5/14/2020 for dysfunctional uterine bleeding, emb today per dr chavez , pt c/o bleeding on and off lasting up to 28 days , not bleeding for the past 2 weeks. Patient reports she saw a Gynecologist at [facility] earlier this year that checked labwork, did an EMB and all was normal. Will request records and review. Discussed weight and patient states this  Is the most she has ever weighed.  She states  for the past 2 1/2 years she weighed about 335-350. Discussed weight gain as it can relate to menses changing.     Patient reports she was on Sprintec with good results from age [age] and has not been on any contraception since. She reports dark hair growth on her chin and a h/o PCOS diagnosis.           TVUS on 5/14/20 revealed   Mild generalized endometrial thickening with probable tiny amount of  fluid or blood within the intrauterine cavity.  2. Complex left ovarian cyst or 2 adjacent cysts having an overall size  that measures up to 3.5 cm. There is no free fluid.    History of Present Illness    The following portions of the patient's history were reviewed and updated as appropriate: allergies, current medications, past family history, past medical history, past social history, past surgical history and problem list.    Review of Systems   Constitutional: Negative for chills, fatigue and fever.   Gastrointestinal: Negative for abdominal distention and abdominal pain.   Genitourinary: Positive for menstrual problem and pelvic pain. Negative for dyspareunia, dysuria, vaginal bleeding, vaginal discharge and vaginal pain.   All other systems reviewed and are negative.  /86   Ht 175.3 cm (69\")   Wt (!) 165 kg (364 lb)   LMP 05/12/2020 (Exact Date)   Breastfeeding No   BMI 53.75 kg/m²       Objective   Physical Exam   Constitutional: She is oriented to person, place, " and time. She appears well-developed and well-nourished.   Pulmonary/Chest: Effort normal.   Neurological: She is alert and oriented to person, place, and time.   Skin: Skin is warm and dry.   Psychiatric: She has a normal mood and affect. Her behavior is normal.   Nursing note and vitals reviewed.        Assessment/Plan   Lanie was seen today for establish care and gynecologic exam.    Diagnoses and all orders for this visit:    Abnormal uterine bleeding (AUB)  -     norgestimate-ethinyl estradiol (Sprintec 28) 0.25-35 MG-MCG per tablet; Take 1 tablet by mouth Daily.    Special screening  -     POC Pregnancy, Urine    Screening for blood or protein in urine  -     POC Urinalysis Dipstick    Morbid obesity with BMI of 50.0-59.9, adult (CMS/HCC)    Left ovarian cyst      Return 8 weeks TVUS and f/u   Will review records from previous Gyn    Counseling was given to patient for the following topics: diagnostic results, instructions for management, risk factor reductions, impressions, risks and benefits of treatment options and importance of treatment compliance . Total time of the encounter was 30 minutes and 25 minutes was spend counseling.

## 2020-06-23 ENCOUNTER — TELEPHONE (OUTPATIENT)
Dept: OBSTETRICS AND GYNECOLOGY | Age: 30
End: 2020-06-23

## 2020-07-08 ENCOUNTER — OFFICE VISIT (OUTPATIENT)
Dept: SLEEP MEDICINE | Facility: HOSPITAL | Age: 30
End: 2020-07-08

## 2020-07-08 VITALS
WEIGHT: 293 LBS | DIASTOLIC BLOOD PRESSURE: 84 MMHG | BODY MASS INDEX: 43.4 KG/M2 | HEART RATE: 103 BPM | HEIGHT: 69 IN | SYSTOLIC BLOOD PRESSURE: 147 MMHG | OXYGEN SATURATION: 97 %

## 2020-07-08 DIAGNOSIS — G47.33 OBSTRUCTIVE SLEEP APNEA SYNDROME: Primary | ICD-10-CM

## 2020-07-08 PROCEDURE — G0463 HOSPITAL OUTPT CLINIC VISIT: HCPCS

## 2020-07-08 PROCEDURE — 99213 OFFICE O/P EST LOW 20 MIN: CPT | Performed by: FAMILY MEDICINE

## 2020-07-08 NOTE — PROGRESS NOTES
Follow Up [facility] Note     Chief Complaint:  SOFI     Primary Care Physician: Joanne Urbina APRN    Lanie Moctezuma is a 30 y.o.female  was last seen at [facility] sleep lab: 1/29/2020.  Sleep study in June 2019 showed AHI of 8.1 events per hour.  Change to set pressure of 6 cm H2O due to abdominal bloating.  At last visit overall AHI was 0.1.  Has some residual fatigue secondary to menstrual bleeding and was on iron tablets at that time.  Presents today for follow-up.  Today patient reports unable to use CPAP because lowered her pressure 9 next field so she is not getting enough air which causes anxiety.  Has extremely irregular menstrual cycles with iron deficiency anemia.  Still very tired.    Results Review:  DME is [facility].  Downloads between 4/7/2020-7/5/2020.  Average usage is 3 hours 18 minutes.  Average AHI is 0.2.  Average AutoCPAP pressure is 6 cm H2O.  Has not used machine for over a month.    Current Medications:    Current Outpatient Medications:   •  albuterol sulfate  (90 Base) MCG/ACT inhaler, Inhale 2 puffs., Disp: , Rfl:   •  aspirin-acetaminophen-caffeine (EXCEDRIN MIGRAINE) 250-250-65 MG per tablet, Take 1 tablet by mouth Every 6 (Six) Hours As Needed for Headache., Disp: , Rfl:   •  calcium carbonate (TUMS) 500 MG chewable tablet, Chew 1 tablet., Disp: , Rfl:   •  calcium polycarbophil (FIBERCON) 625 MG tablet, Take 1 tablet by mouth Daily., Disp: , Rfl:   •  Cholecalciferol (VITAMIN D3) 1000 units capsule, Take 4,000 Units by mouth., Disp: , Rfl:   •  cholecalciferol (VITAMIN D3) 98498 units capsule, 7,000 Units., Disp: , Rfl:   •  clonazePAM (KlonoPIN) 0.5 MG tablet, Take 0.5 mg by mouth 2 (Two) Times a Day As Needed for Seizures., Disp: , Rfl:   •  diphenhydrAMINE (BENADRYL) 25 MG tablet, Take 25 mg by mouth Every 6 (Six) Hours As Needed for Itching., Disp: , Rfl:   •  ferrous sulfate 324 (65 Fe) MG tablet delayed-release EC tablet, Take 65 mg by mouth 3  (Three) Times a Day., Disp: , Rfl:   •  fexofenadine (ALLEGRA) 180 MG tablet, TAKE 1 TABLET BY MOUTH EVERY DAY, Disp: 90 tablet, Rfl: 1  •  fluticasone (FLONASE) 50 MCG/ACT nasal spray, 2 SPRAYS INTO THE NOSTRIL(S) AS DIRECTED BY PROVIDER DAILY., Disp: 48 mL, Rfl: 3  •  montelukast (SINGULAIR) 10 MG tablet, TAKE 1 TABLET BY MOUTH EVERY DAY AT NIGHT, Disp: 90 tablet, Rfl: 1  •  Multiple Vitamin (MULTI-VITAMIN DAILY) tablet, , Disp: , Rfl:   •  Multiple Vitamins-Minerals (HAIR SKIN AND NAILS FORMULA PO), , Disp: , Rfl:   •  norgestimate-ethinyl estradiol (Sprintec 28) 0.25-35 MG-MCG per tablet, Take 1 tablet by mouth Daily., Disp: 28 tablet, Rfl: 12  •  ondansetron (ZOFRAN) 4 MG tablet, , Disp: , Rfl:    also entered in Sleep Questionnaire    Patient  has a past medical history of Anxiety, Asthma, Depression, Migraine, Morbid obesity with BMI of 50.0-59.9, adult (CMS/[facility]) (6/22/2020), Ovarian cyst, PHN (postherpetic neuralgia) (2/28/2017), Shingles, and Sleep apnea.    Social History:    Social History     Socioeconomic History   • Marital status: Single     Spouse name: Not on file   • Number of children: Not on file   • Years of education: Not on file   • Highest education level: Not on file   Tobacco Use   • Smoking status: Never Smoker   • Smokeless tobacco: Never Used   Substance and Sexual Activity   • Alcohol use: Not Currently     Alcohol/week: 1.0 - 2.0 standard drinks     Types: 1 - 2 Glasses of wine per week     Comment: occasionally     • Drug use: No   • Sexual activity: Defer     Partners: Male     Birth control/protection: None       Allergies:  Cantaloupe (diagnostic); Nuts; Doxycycline; and Eggs or egg-derived products    Review of Systems:    A complete review of systems was done and all were negative with the exception of reflux anxiety    Vital Signs:    Vitals:    07/08/20 1500   BP: 147/84   BP Location: Left arm   Patient Position: Sitting   Pulse: 103   SpO2: 97%   Weight: (!) 164 kg (362 lb)  "  Height: 175.3 cm (69\")     Body mass index is 53.46 kg/m².    Vital Signs /84 (BP Location: Left arm, Patient Position: Sitting)   Pulse 103   Ht 175.3 cm (69\")   Wt (!) 164 kg (362 lb)   SpO2 97%   BMI 53.46 kg/m²  Body mass index is 53.46 kg/m².    General Alert and oriented. No acute distress noted   Pharynx/Throat Class IV Mallampati airway, large tongue, no evidence of redundant lateral pharyngeal tissue. No oral lesions. No thrush. Moist mucous membranes.   Head Normocephalic. Symmetrical. Atraumatic.    Nose No septal deviation. No drainage   Chest Wall Normal shape. Symmetric expansion with respiration. No tenderness.   Neck Trachea midline, no thyromegaly or adenopathy    Lungs Clear to auscultation bilaterally. No wheezes. No rhonchi. No rales. Respirations regular, even and unlabored.   Heart Regular rhythm and normal rate. Normal S1 and S2. No murmur   Abdomen Soft, non-tender and non-distended. Normal bowel sounds. No masses.   Extremities Moves all extremities well. No edema   Psychiatric Normal mood and affect.     Impression:  1. Obstructive sleep apnea syndrome        We will increase pressure to 8 cm H2O.  If still having issues with CPAP machine will consider referral to dental sleep specialist for oral mandibular device.    Patient uses the CPAP device and benefits from its use in terms of reduction of hypersomnia and snoring.Weight loss will be strongly beneficial to reduce the severity of sleep-disordered breathing.  Caution during activities that require prolonged concentration is strongly advised if sleepiness returns. Changing of PAP supplies regularly is important for effective use. Patient needs to change cushion on the mask or plugs on nasal pillows along with disposable filters once every month and change mask frame, tubing, headgear and Velcro straps every 6 months at the minimum.    Jose Bell MD  Sleep Medicine  07/08/20  16:36      "

## 2020-07-27 ENCOUNTER — PROCEDURE VISIT (OUTPATIENT)
Dept: OBSTETRICS AND GYNECOLOGY | Age: 30
End: 2020-07-27

## 2020-07-27 ENCOUNTER — OFFICE VISIT (OUTPATIENT)
Dept: OBSTETRICS AND GYNECOLOGY | Age: 30
End: 2020-07-27

## 2020-07-27 VITALS — SYSTOLIC BLOOD PRESSURE: 145 MMHG | DIASTOLIC BLOOD PRESSURE: 80 MMHG | WEIGHT: 293 LBS | BODY MASS INDEX: 53.16 KG/M2

## 2020-07-27 DIAGNOSIS — N83.202 LEFT OVARIAN CYST: ICD-10-CM

## 2020-07-27 DIAGNOSIS — N93.9 ABNORMAL UTERINE BLEEDING (AUB): Primary | ICD-10-CM

## 2020-07-27 PROCEDURE — 76830 TRANSVAGINAL US NON-OB: CPT | Performed by: OBSTETRICS & GYNECOLOGY

## 2020-07-27 PROCEDURE — 99214 OFFICE O/P EST MOD 30 MIN: CPT | Performed by: OBSTETRICS & GYNECOLOGY

## 2020-07-27 NOTE — PROGRESS NOTES
Subjective   Lanie Moctezuma is a 30 y.o. female presents for f/u on AUB  - 8 wks f/u with us today , pt doing much better on sprintec , lighter period less bleeding,  c/o some abdominal cramping.  TVUS reveals 8 cm AV uterus with 2.9 ET and .9 cm left ovarian cyst c/w possible dermoid. Patient just had her first period on the Sprintec. Describes it as heavy and painful but only 5 days. No BTB. Will return in 3 months to see how periods are doing - may add Metformin then.      History of Present Illness    The following portions of the patient's history were reviewed and updated as appropriate: allergies, current medications, past family history, past medical history, past social history, past surgical history and problem list.    Review of Systems   Constitutional: Negative for chills, fatigue and fever.   Gastrointestinal: Negative for abdominal distention and abdominal pain.   Genitourinary: Positive for menstrual problem. Negative for dyspareunia, dysuria, pelvic pain, vaginal bleeding, vaginal discharge and vaginal pain.   All other systems reviewed and are negative.    /80   Wt (!) 163 kg (360 lb)   LMP 07/20/2020 (Approximate)   Breastfeeding No   BMI 53.16 kg/m²     Objective   Physical Exam   Constitutional: She is oriented to person, place, and time. She appears well-developed and well-nourished.   Pulmonary/Chest: Effort normal.   Neurological: She is alert and oriented to person, place, and time.   Skin: Skin is warm and dry.   Psychiatric: She has a normal mood and affect. Her behavior is normal.   Nursing note and vitals reviewed.      Assessment/Plan   Lanie was seen today for gynecologic exam.    Diagnoses and all orders for this visit:    Abnormal uterine bleeding (AUB)    Left ovarian cyst       Counseling was given to patient for the following topics: diagnostic results, instructions for management, impressions, risks and benefits of treatment options and importance of treatment  compliance . Total time of the encounter was 25 minutes and 20 minutes was spend counseling.  Return 3 months for US and f/u

## 2020-09-03 ENCOUNTER — HOSPITAL ENCOUNTER (OUTPATIENT)
Dept: MRI IMAGING | Facility: HOSPITAL | Age: 30
Discharge: HOME | End: 2020-09-03
Admitting: NURSE PRACTITIONER

## 2020-09-03 DIAGNOSIS — K76.9 LESION OF LIVER GREATER THAN 1 CM IN DIAMETER WITH NO LIVER DISEASE OR HISTORY OF MALIGNANT NEOPLASM: ICD-10-CM

## 2020-09-03 PROCEDURE — 74183 MRI ABD W/O CNTR FLWD CNTR: CPT

## 2020-09-03 PROCEDURE — 25010000002 GADOTERATE MEGLUMINE 10 MMOL/20ML SOLUTION: Performed by: NURSE PRACTITIONER

## 2020-09-03 PROCEDURE — A9575 INJ GADOTERATE MEGLUMI 0.1ML: HCPCS | Performed by: NURSE PRACTITIONER

## 2020-09-03 RX ORDER — GADOTERATE MEGLUMINE 376.9 MG/ML
20 INJECTION INTRAVENOUS
Status: COMPLETED | OUTPATIENT
Start: 2020-09-03 | End: 2020-09-03

## 2020-09-03 RX ADMIN — GADOTERATE MEGLUMINE 20 ML: 376.9 INJECTION, SOLUTION INTRAVENOUS at 13:21

## 2020-09-08 RX ORDER — FEXOFENADINE HCL 180 MG/1
TABLET ORAL
Qty: 90 TABLET | Refills: 1 | Status: SHIPPED | OUTPATIENT
Start: 2020-09-08 | End: 2021-02-25

## 2020-09-08 RX ORDER — MONTELUKAST SODIUM 10 MG/1
TABLET ORAL
Qty: 90 TABLET | Refills: 1 | Status: SHIPPED | OUTPATIENT
Start: 2020-09-08 | End: 2021-02-25

## 2020-09-16 ENCOUNTER — APPOINTMENT (OUTPATIENT)
Dept: SLEEP MEDICINE | Facility: HOSPITAL | Age: 30
End: 2020-09-16

## 2020-09-22 NOTE — TELEPHONE ENCOUNTER
Pt called wanting to know if we can put in a referral to dr.secor brooks. Pt said that she went to urgent care Saturday and they recommended ent cause there was some pus and infection in the ear.   
Patient admitted for OM of left big toe. Has history of hypertension.
Patient admitted for non pressure chronic ulcer of Left great toe
patient was admitted for diabetic foot ulcer

## 2020-12-02 ENCOUNTER — PROCEDURE VISIT (OUTPATIENT)
Dept: OBSTETRICS AND GYNECOLOGY | Age: 30
End: 2020-12-02

## 2020-12-02 ENCOUNTER — OFFICE VISIT (OUTPATIENT)
Dept: OBSTETRICS AND GYNECOLOGY | Age: 30
End: 2020-12-02

## 2020-12-02 VITALS
SYSTOLIC BLOOD PRESSURE: 140 MMHG | HEIGHT: 69 IN | WEIGHT: 293 LBS | BODY MASS INDEX: 43.4 KG/M2 | DIASTOLIC BLOOD PRESSURE: 84 MMHG

## 2020-12-02 DIAGNOSIS — E66.01 MORBID OBESITY WITH BMI OF 50.0-59.9, ADULT (HCC): ICD-10-CM

## 2020-12-02 DIAGNOSIS — L68.0 FEMALE HIRSUTISM: ICD-10-CM

## 2020-12-02 DIAGNOSIS — N93.9 ABNORMAL UTERINE BLEEDING (AUB): Primary | ICD-10-CM

## 2020-12-02 DIAGNOSIS — E28.2 PCOS (POLYCYSTIC OVARIAN SYNDROME): ICD-10-CM

## 2020-12-02 DIAGNOSIS — Z13.89 SCREENING FOR BLOOD OR PROTEIN IN URINE: ICD-10-CM

## 2020-12-02 DIAGNOSIS — Z98.890 H/O OVARIAN CYSTECTOMY: ICD-10-CM

## 2020-12-02 DIAGNOSIS — N83.202 LEFT OVARIAN CYST: ICD-10-CM

## 2020-12-02 DIAGNOSIS — Z87.42 H/O OVARIAN CYSTECTOMY: ICD-10-CM

## 2020-12-02 LAB
BILIRUB BLD-MCNC: NEGATIVE MG/DL
GLUCOSE UR STRIP-MCNC: NEGATIVE MG/DL
KETONES UR QL: NEGATIVE
LEUKOCYTE EST, POC: NEGATIVE
NITRITE UR-MCNC: NEGATIVE MG/ML
PH UR: 6 [PH] (ref 5–8)
PROT UR STRIP-MCNC: NEGATIVE MG/DL
RBC # UR STRIP: NEGATIVE /UL
SP GR UR: 1.02 (ref 1–1.03)
UROBILINOGEN UR QL: NORMAL

## 2020-12-02 PROCEDURE — 99214 OFFICE O/P EST MOD 30 MIN: CPT | Performed by: OBSTETRICS & GYNECOLOGY

## 2020-12-02 PROCEDURE — 76830 TRANSVAGINAL US NON-OB: CPT | Performed by: OBSTETRICS & GYNECOLOGY

## 2020-12-02 PROCEDURE — 81002 URINALYSIS NONAUTO W/O SCOPE: CPT | Performed by: OBSTETRICS & GYNECOLOGY

## 2020-12-02 NOTE — PROGRESS NOTES
"Madina Moctezuma is a 30 y.o. female presents for f/u on AUB and u/s today, last pap 4/16/19 neg, doing well on Sprintec bc, periods have improved greatly, TVUS on 7/27/20 revealed 8cm AV uterus with 2.9 ET and .9 cm left ovarian cyst c/w possible dermoid.  TVUS today with 8 cm AV uterus and 4 mm ET with 1 cm left dermoid.  Will add Metformin BID for PCOS and to assist with weight loss. If tolerates, may increase to TID.        I last saw patient in July of this year for f/u on AUB  - 8 wks f/u with us today , pt doing much better on sprintec , lighter period less bleeding,  c/o some abdominal cramping.  TVUS reveals 8 cm AV uterus with 2.9 ET and .9 cm left ovarian cyst c/w possible dermoid. Patient just had her first period on the Sprintec. Describes it as heavy and painful but only 5 days. No BTB. Will return in 3 months to see how periods are doing - may add Metformin then.          History of Present Illness    The following portions of the patient's history were reviewed and updated as appropriate: allergies, current medications, past family history, past medical history, past social history, past surgical history and problem list.    Review of Systems   Constitutional: Negative for chills, fatigue and fever.   Gastrointestinal: Negative for abdominal distention and abdominal pain.   Genitourinary: Negative for dyspareunia, dysuria, menstrual problem, pelvic pain, vaginal bleeding, vaginal discharge and vaginal pain.   All other systems reviewed and are negative.    /84   Ht 175.3 cm (69.02\")   Wt (!) 168 kg (370 lb)   BMI 54.61 kg/m²     Objective   Physical Exam  Vitals signs and nursing note reviewed.   Constitutional:       Appearance: Normal appearance. She is obese.   Pulmonary:      Effort: Pulmonary effort is normal.   Neurological:      Mental Status: She is alert and oriented to person, place, and time.   Psychiatric:         Mood and Affect: Mood normal.         Behavior: " Behavior normal.         Assessment/Plan   Diagnoses and all orders for this visit:    1. Abnormal uterine bleeding (AUB) (Primary)    2. Screening for blood or protein in urine  -     POC Urinalysis Dipstick    3. Morbid obesity with BMI of 50.0-59.9, adult (CMS/HCC)    4. H/O ovarian cystectomy    5. Female hirsutism    6. PCOS (polycystic ovarian syndrome)  -     metFORMIN (Glucophage) 500 MG tablet; Take 1 tablet by mouth 2 (Two) Times a Day With Meals.  Dispense: 60 tablet; Refill: 6    7. Left ovarian cyst       Counseling was given to patient for the following topics: diagnostic results, instructions for management, risk factor reductions, impressions and importance of treatment compliance . Total time of the encounter was 25 minutes and 20 minutes was spend counseling.  Return in about 6 months (around 6/2/2021), or F/U PCOS/Metformin.

## 2020-12-09 RX ORDER — ALBUTEROL SULFATE 90 UG/1
2 AEROSOL, METERED RESPIRATORY (INHALATION) EVERY 4 HOURS PRN
Qty: 18 G | Refills: 1 | Status: SHIPPED | OUTPATIENT
Start: 2020-12-09

## 2020-12-09 NOTE — TELEPHONE ENCOUNTER
Caller: Lanie Moctezuma    Relationship: Self    Best call back number:[phone]     Medication needed:   Requested Prescriptions     Pending Prescriptions Disp Refills   • albuterol sulfate  (90 Base) MCG/ACT inhaler        Sig: Inhale 2 puffs.       When do you need the refill by: ASAP        Does the patient have less than a 3 day supply:  [x] Yes  [] No    What is the patient's preferred pharmacy: [address] [phone] [facility] [phone]

## 2020-12-16 ENCOUNTER — APPOINTMENT (OUTPATIENT)
Dept: SLEEP MEDICINE | Facility: HOSPITAL | Age: 30
End: 2020-12-16

## 2021-01-27 ENCOUNTER — APPOINTMENT (OUTPATIENT)
Dept: SLEEP MEDICINE | Facility: HOSPITAL | Age: 31
End: 2021-01-27

## 2021-02-25 RX ORDER — MONTELUKAST SODIUM 10 MG/1
TABLET ORAL
Qty: 90 TABLET | Refills: 1 | Status: SHIPPED | OUTPATIENT
Start: 2021-02-25 | End: 2021-06-18 | Stop reason: SDUPTHER

## 2021-02-25 RX ORDER — FEXOFENADINE HCL 180 MG/1
TABLET ORAL
Qty: 90 TABLET | Refills: 1 | Status: SHIPPED | OUTPATIENT
Start: 2021-02-25 | End: 2021-06-18 | Stop reason: SDUPTHER

## 2021-05-27 ENCOUNTER — TELEPHONE (OUTPATIENT)
Dept: OBSTETRICS AND GYNECOLOGY | Age: 31
End: 2021-05-27

## 2021-05-30 DIAGNOSIS — J30.9 ATOPIC RHINITIS: ICD-10-CM

## 2021-06-01 RX ORDER — FLUTICASONE PROPIONATE 50 MCG
SPRAY, SUSPENSION (ML) NASAL
Qty: 48 ML | Refills: 3 | Status: SHIPPED | OUTPATIENT
Start: 2021-06-01

## 2021-06-07 ENCOUNTER — OFFICE VISIT (OUTPATIENT)
Dept: OBSTETRICS AND GYNECOLOGY | Age: 31
End: 2021-06-07

## 2021-06-07 VITALS
WEIGHT: 293 LBS | DIASTOLIC BLOOD PRESSURE: 82 MMHG | HEIGHT: 69 IN | SYSTOLIC BLOOD PRESSURE: 138 MMHG | BODY MASS INDEX: 43.4 KG/M2

## 2021-06-07 DIAGNOSIS — N83.202 LEFT OVARIAN CYST: ICD-10-CM

## 2021-06-07 DIAGNOSIS — N93.9 ABNORMAL UTERINE BLEEDING (AUB): Primary | ICD-10-CM

## 2021-06-07 DIAGNOSIS — E66.01 MORBID OBESITY WITH BMI OF 50.0-59.9, ADULT (HCC): ICD-10-CM

## 2021-06-07 DIAGNOSIS — L68.0 FEMALE HIRSUTISM: ICD-10-CM

## 2021-06-07 PROCEDURE — 99213 OFFICE O/P EST LOW 20 MIN: CPT | Performed by: OBSTETRICS & GYNECOLOGY

## 2021-06-07 NOTE — PROGRESS NOTES
"Subjective   Lanie Moctezuma is a 31 y.o. female presents for 6 mn f/u on pcos and metformin - patient takes metformin once daily - c/o having some side effects with metformin upset stomach and diarrhea, unable to increase to bid  , periods are more regular on sprintec.  Weight has been stable since I saw her last.  Started therapy and reading \"What happened to you?\" - hopes it will help with weight loss. Will mention new FDA med for weight loss to PCP.        I last saw patient in 12/2020 for f/u on AUB and u/s - was doing well on Sprintec bc, periods had improved greatly, TVUS on 7/27/20 revealed 8cm AV uterus with 2.9 ET and .9 cm left ovarian cyst c/w possible dermoid.  TVUS today with 8 cm AV uterus and 4 mm ET with 1 cm left dermoid.  Will add Metformin BID for PCOS and to assist with weight loss. If tolerates, may increase to TID.        History of Present Illness    The following portions of the patient's history were reviewed and updated as appropriate: allergies, current medications, past family history, past medical history, past social history, past surgical history and problem list.    Review of Systems   Constitutional: Negative for chills, fatigue and fever.   Gastrointestinal: Negative for abdominal distention and abdominal pain.   Genitourinary: Negative for dyspareunia, dysuria, menstrual problem, pelvic pain, vaginal bleeding, vaginal discharge and vaginal pain.   All other systems reviewed and are negative.    /82   Ht 175.3 cm (69.02\")   Wt (!) 168 kg (370 lb)   LMP 05/27/2021 (Exact Date)   Breastfeeding No   BMI 54.61 kg/m²     Objective   Physical Exam  Vitals and nursing note reviewed.   Constitutional:       Appearance: Normal appearance. She is obese.   Pulmonary:      Effort: Pulmonary effort is normal.   Neurological:      Mental Status: She is alert and oriented to person, place, and time.   Psychiatric:         Mood and Affect: Mood normal.         Behavior: Behavior " normal.         Assessment/Plan   Diagnoses and all orders for this visit:    1. Abnormal uterine bleeding (AUB) (Primary)    2. Morbid obesity with BMI of 50.0-59.9, adult (CMS/[facility])    3. Left ovarian cyst    4. Female hirsutism       Counseling was given to patient for the following topics: instructions for management, impressions, risks and benefits of treatment options and importance of treatment compliance . Total time of the encounter was 20 minutes and 15 minutes was spent counseling.    Return in about 6 months (around 12/7/2021) for Annual physical with TVUS.

## 2021-06-16 DIAGNOSIS — N93.9 ABNORMAL UTERINE BLEEDING (AUB): ICD-10-CM

## 2021-06-18 ENCOUNTER — OFFICE VISIT (OUTPATIENT)
Dept: FAMILY MEDICINE CLINIC | Facility: CLINIC | Age: 31
End: 2021-06-18

## 2021-06-18 VITALS
OXYGEN SATURATION: 98 % | DIASTOLIC BLOOD PRESSURE: 86 MMHG | TEMPERATURE: 96.6 F | BODY MASS INDEX: 43.4 KG/M2 | HEART RATE: 104 BPM | SYSTOLIC BLOOD PRESSURE: 136 MMHG | RESPIRATION RATE: 16 BRPM | HEIGHT: 69 IN | WEIGHT: 293 LBS

## 2021-06-18 DIAGNOSIS — Z13.29 SCREENING FOR THYROID DISORDER: ICD-10-CM

## 2021-06-18 DIAGNOSIS — Z11.59 ENCOUNTER FOR HEPATITIS C SCREENING TEST FOR LOW RISK PATIENT: ICD-10-CM

## 2021-06-18 DIAGNOSIS — Z00.00 ANNUAL PHYSICAL EXAM: Primary | ICD-10-CM

## 2021-06-18 DIAGNOSIS — Z13.220 SCREENING FOR LIPOID DISORDERS: ICD-10-CM

## 2021-06-18 DIAGNOSIS — Z79.899 MEDICATION MANAGEMENT: ICD-10-CM

## 2021-06-18 DIAGNOSIS — Z13.1 SCREENING FOR DIABETES MELLITUS: ICD-10-CM

## 2021-06-18 PROCEDURE — 99395 PREV VISIT EST AGE 18-39: CPT | Performed by: NURSE PRACTITIONER

## 2021-06-18 RX ORDER — ONDANSETRON 4 MG/1
4 TABLET, FILM COATED ORAL EVERY 8 HOURS PRN
Qty: 30 TABLET | Refills: 1 | Status: SHIPPED | OUTPATIENT
Start: 2021-06-18 | End: 2022-03-22

## 2021-06-18 RX ORDER — FEXOFENADINE HCL 180 MG/1
180 TABLET ORAL DAILY
Qty: 90 TABLET | Refills: 1 | Status: SHIPPED | OUTPATIENT
Start: 2021-06-18 | End: 2021-12-08

## 2021-06-18 RX ORDER — MONTELUKAST SODIUM 10 MG/1
10 TABLET ORAL
Qty: 90 TABLET | Refills: 1 | Status: SHIPPED | OUTPATIENT
Start: 2021-06-18 | End: 2021-12-06

## 2021-06-19 LAB
ALBUMIN SERPL-MCNC: 3.8 G/DL (ref 3.5–5.2)
ALBUMIN/GLOB SERPL: 1.4 G/DL
ALP SERPL-CCNC: 80 U/L (ref 39–117)
ALT SERPL-CCNC: 18 U/L (ref 1–33)
AST SERPL-CCNC: 8 U/L (ref 1–32)
BASOPHILS # BLD AUTO: 0.1 10*3/MM3 (ref 0–0.2)
BASOPHILS NFR BLD AUTO: 0.9 % (ref 0–1.5)
BILIRUB SERPL-MCNC: <0.2 MG/DL (ref 0–1.2)
BUN SERPL-MCNC: 12 MG/DL (ref 6–20)
BUN/CREAT SERPL: 14.8 (ref 7–25)
CALCIUM SERPL-MCNC: 9.2 MG/DL (ref 8.6–10.5)
CHLORIDE SERPL-SCNC: 102 MMOL/L (ref 98–107)
CHOLEST SERPL-MCNC: 198 MG/DL (ref 0–200)
CO2 SERPL-SCNC: 25 MMOL/L (ref 22–29)
CREAT SERPL-MCNC: 0.81 MG/DL (ref 0.57–1)
EOSINOPHIL # BLD AUTO: 0.26 10*3/MM3 (ref 0–0.4)
EOSINOPHIL NFR BLD AUTO: 2.3 % (ref 0.3–6.2)
ERYTHROCYTE [DISTWIDTH] IN BLOOD BY AUTOMATED COUNT: 15.8 % (ref 12.3–15.4)
GLOBULIN SER CALC-MCNC: 2.7 GM/DL
GLUCOSE SERPL-MCNC: 85 MG/DL (ref 65–99)
HBA1C MFR BLD: 5.81 % (ref 4.8–5.6)
HCT VFR BLD AUTO: 38 % (ref 34–46.6)
HCV AB S/CO SERPL IA: <0.1 S/CO RATIO (ref 0–0.9)
HDLC SERPL-MCNC: 63 MG/DL (ref 40–60)
HGB BLD-MCNC: 12.3 G/DL (ref 12–15.9)
IMM GRANULOCYTES # BLD AUTO: 0.05 10*3/MM3 (ref 0–0.05)
IMM GRANULOCYTES NFR BLD AUTO: 0.4 % (ref 0–0.5)
LDLC SERPL CALC-MCNC: 94 MG/DL (ref 0–100)
LDLC/HDLC SERPL: 1.36 {RATIO}
LYMPHOCYTES # BLD AUTO: 2.13 10*3/MM3 (ref 0.7–3.1)
LYMPHOCYTES NFR BLD AUTO: 18.9 % (ref 19.6–45.3)
MCH RBC QN AUTO: 26.9 PG (ref 26.6–33)
MCHC RBC AUTO-ENTMCNC: 32.4 G/DL (ref 31.5–35.7)
MCV RBC AUTO: 83.2 FL (ref 79–97)
MONOCYTES # BLD AUTO: 0.61 10*3/MM3 (ref 0.1–0.9)
MONOCYTES NFR BLD AUTO: 5.4 % (ref 5–12)
NEUTROPHILS # BLD AUTO: 8.12 10*3/MM3 (ref 1.7–7)
NEUTROPHILS NFR BLD AUTO: 72.1 % (ref 42.7–76)
NRBC BLD AUTO-RTO: 0 /100 WBC (ref 0–0.2)
PLATELET # BLD AUTO: 344 10*3/MM3 (ref 140–450)
POTASSIUM SERPL-SCNC: 4.7 MMOL/L (ref 3.5–5.2)
PROT SERPL-MCNC: 6.5 G/DL (ref 6–8.5)
RBC # BLD AUTO: 4.57 10*6/MM3 (ref 3.77–5.28)
SODIUM SERPL-SCNC: 138 MMOL/L (ref 136–145)
TRIGL SERPL-MCNC: 248 MG/DL (ref 0–150)
TSH SERPL DL<=0.005 MIU/L-ACNC: 1.49 UIU/ML (ref 0.27–4.2)
VLDLC SERPL CALC-MCNC: 41 MG/DL (ref 5–40)
WBC # BLD AUTO: 11.27 10*3/MM3 (ref 3.4–10.8)

## 2021-11-05 ENCOUNTER — OFFICE VISIT (OUTPATIENT)
Dept: OBSTETRICS AND GYNECOLOGY | Age: 31
End: 2021-11-05

## 2021-11-05 VITALS
BODY MASS INDEX: 43.4 KG/M2 | HEIGHT: 69 IN | SYSTOLIC BLOOD PRESSURE: 128 MMHG | DIASTOLIC BLOOD PRESSURE: 84 MMHG | WEIGHT: 293 LBS

## 2021-11-05 DIAGNOSIS — Z13.89 SCREENING FOR BLOOD OR PROTEIN IN URINE: Primary | ICD-10-CM

## 2021-11-05 LAB
BILIRUB BLD-MCNC: NEGATIVE MG/DL
CLARITY, POC: CLEAR
COLOR UR: YELLOW
GLUCOSE UR STRIP-MCNC: NEGATIVE MG/DL
KETONES UR QL: NEGATIVE
LEUKOCYTE EST, POC: NEGATIVE
NITRITE UR-MCNC: NEGATIVE MG/ML
PH UR: 6 [PH] (ref 5–8)
PROT UR STRIP-MCNC: NEGATIVE MG/DL
RBC # UR STRIP: ABNORMAL /UL
SP GR UR: 1 (ref 1–1.03)
UROBILINOGEN UR QL: NORMAL

## 2021-11-05 PROCEDURE — 99213 OFFICE O/P EST LOW 20 MIN: CPT | Performed by: NURSE PRACTITIONER

## 2021-11-05 PROCEDURE — 81003 URINALYSIS AUTO W/O SCOPE: CPT | Performed by: NURSE PRACTITIONER

## 2021-11-05 NOTE — PROGRESS NOTES
"Madina Moctezuma is a 31 y.o. female is being seen today for   Chief Complaint   Patient presents with   • Abdominal Pain     c/o lower abdominal pain mostly on the left, moving to the right or to back and down leg, history of PCOS, and cysts.   .    History of Present Illness     Patient here with left lower abdominal pain  She states it also radiated to her back and down her leg  It started about two weeks ago and states it was 10/10.  It was left lower quadrant but did radiate into her back and down her leg too  She has a history of ovarian cysts and was concerned it may be that  It did seem to improve some and she only had one other severe episode of pain since then  She still rates it a 6/10 most of the time  States it increases with certain movement  She does feel like her bowel movements have been less than normal for her  No fevers or vomiting  Some nausea and decreased appetite  No bladder changes that she can tell  No new partners or vaginal complaints  No change in discharge  She is taking OCPs and cycles are regular.  She has not missed any pills    The following portions of the patient's history were reviewed and updated as appropriate: allergies, current medications, past family history, past medical history, past social history, past surgical history and problem list.    /84   Ht 175.3 cm (69\")   Wt (!) 168 kg (370 lb)   LMP 10/15/2021 (Exact Date)   Breastfeeding No   BMI 54.64 kg/m²         Review of Systems   Constitutional: Negative.    HENT: Negative.    Eyes: Negative.    Respiratory: Negative.    Cardiovascular: Negative.    Gastrointestinal: Positive for abdominal pain, constipation and nausea. Negative for diarrhea and vomiting.   Endocrine: Negative.    Genitourinary: Positive for pelvic pain. Negative for difficulty urinating, dysuria, flank pain, menstrual problem, vaginal bleeding, vaginal discharge and vaginal pain.   Musculoskeletal: Negative.    Skin: " Negative.    Allergic/Immunologic: Negative.    Neurological: Negative.    Hematological: Negative.    Psychiatric/Behavioral: Negative.        Objective   Physical Exam  Constitutional:       Appearance: She is well-developed.   Cardiovascular:      Rate and Rhythm: Normal rate and regular rhythm.   Pulmonary:      Effort: Pulmonary effort is normal.   Abdominal:      General: Bowel sounds are normal. There is no distension.      Palpations: Abdomen is soft.      Tenderness: There is no abdominal tenderness. There is no guarding or rebound.   Skin:     General: Skin is warm and dry.   Neurological:      Mental Status: She is alert and oriented to person, place, and time.   Psychiatric:         Behavior: Behavior normal.           Assessment/Plan   Diagnoses and all orders for this visit:    1. Screening for blood or protein in urine (Primary)  -     POC Urinalysis Dipstick, Multipro  -     Urine Culture - Urine, Urine, Clean Catch        Ultrasound done.  Anteverted uterus.  Left ovary WNL, right ovary unable to be visualized, no ff in CDS  Discussed with patient.  I do not see a gynecological cause of her pain in today's ultrasound or exam  She should continue to monitor her pain and symptoms  Plan follow up with PCP next week  If pain increases or additional s/s this weekend she was instructed to go to the ER for evaluation         Total time spent today with Lanie  was 20-29 minutes (level 3).  Greater than 50% of the time was spent coordinating care, answering her questions and counseling regarding pathophysiology of her presenting problem along with plans for any diagnositc work-up and treatment.

## 2021-11-07 LAB
BACTERIA UR CULT: NORMAL
BACTERIA UR CULT: NORMAL

## 2021-12-06 ENCOUNTER — OFFICE VISIT (OUTPATIENT)
Dept: OBSTETRICS AND GYNECOLOGY | Age: 31
End: 2021-12-06

## 2021-12-06 VITALS
BODY MASS INDEX: 43.4 KG/M2 | DIASTOLIC BLOOD PRESSURE: 80 MMHG | WEIGHT: 293 LBS | SYSTOLIC BLOOD PRESSURE: 142 MMHG | HEIGHT: 69 IN

## 2021-12-06 DIAGNOSIS — R10.2 PELVIC PAIN: ICD-10-CM

## 2021-12-06 DIAGNOSIS — Z01.411 ENCOUNTER FOR GYNECOLOGICAL EXAMINATION WITH ABNORMAL FINDING: Primary | ICD-10-CM

## 2021-12-06 DIAGNOSIS — E28.2 PCOS (POLYCYSTIC OVARIAN SYNDROME): ICD-10-CM

## 2021-12-06 DIAGNOSIS — N83.202 LEFT OVARIAN CYST: ICD-10-CM

## 2021-12-06 DIAGNOSIS — Z13.89 SCREENING FOR BLOOD OR PROTEIN IN URINE: ICD-10-CM

## 2021-12-06 DIAGNOSIS — E66.01 MORBID OBESITY WITH BMI OF 50.0-59.9, ADULT (HCC): ICD-10-CM

## 2021-12-06 DIAGNOSIS — Z12.4 SCREENING FOR MALIGNANT NEOPLASM OF THE CERVIX: ICD-10-CM

## 2021-12-06 DIAGNOSIS — N93.9 ABNORMAL UTERINE BLEEDING (AUB): ICD-10-CM

## 2021-12-06 LAB
BILIRUB BLD-MCNC: NEGATIVE MG/DL
GLUCOSE UR STRIP-MCNC: NEGATIVE MG/DL
KETONES UR QL: NEGATIVE
LEUKOCYTE EST, POC: NEGATIVE
NITRITE UR-MCNC: NEGATIVE MG/ML
PH UR: 7 [PH] (ref 5–8)
PROT UR STRIP-MCNC: NEGATIVE MG/DL
RBC # UR STRIP: NEGATIVE /UL
SP GR UR: 1.02 (ref 1–1.03)
UROBILINOGEN UR QL: NORMAL

## 2021-12-06 PROCEDURE — 99395 PREV VISIT EST AGE 18-39: CPT | Performed by: OBSTETRICS & GYNECOLOGY

## 2021-12-06 PROCEDURE — 81002 URINALYSIS NONAUTO W/O SCOPE: CPT | Performed by: OBSTETRICS & GYNECOLOGY

## 2021-12-06 RX ORDER — METFORMIN HYDROCHLORIDE 500 MG/1
1000 TABLET, EXTENDED RELEASE ORAL
Qty: 60 TABLET | Refills: 4 | Status: SHIPPED | OUTPATIENT
Start: 2021-12-06 | End: 2022-02-28

## 2021-12-06 NOTE — PROGRESS NOTES
"Subjective   Lanie Moctezuma is a 31 y.o. female presents for annual visit today and tvus last pap 4/16/2019 - hx of AUB - ovarian cysts , pt reports having some heavier period in november - period was 8 days long , migraines and abdominal pains , leg pains to the point she had difficulties walking , october period for 2-3 days - been on the same  bcp since last yr july 2020.  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 Sister going through PPD - info given to pass on.      I last saw patient in June for 6 mn f/u on pcos and metformin - patient takes metformin once daily - c/o having some side effects with metformin upset stomach and diarrhea, unable to increase to bid  , periods are more regular on sprintec.  Weight has been stable since I saw her last.  Started therapy and reading \"What happened to you?\" - hopes it will help with weight loss. Will mention new FDA med for weight loss to PCP.        History of Present Illness    The following portions of the patient's history were reviewed and updated as appropriate: allergies, current medications, past family history, past medical history, past social history, past surgical history and problem list.    Review of Systems   Constitutional: Negative for chills, fatigue and unexpected weight change.   Gastrointestinal: Negative for abdominal distention and abdominal pain.   Genitourinary: Positive for menstrual problem and pelvic pain. Negative for dyspareunia, dysuria, vaginal bleeding, vaginal discharge and vaginal pain.   All other systems reviewed and are negative.  /80   Ht 175.3 cm (69\")   Wt (!) 163 kg (360 lb)   LMP 11/07/2021 (Exact Date)   Breastfeeding No   BMI 53.16 kg/m²       Objective   Physical Exam  Vitals and nursing note reviewed.   Constitutional:       Appearance: Normal appearance. She is well-developed. She is obese.   Neck:      Thyroid: No thyromegaly. "   Pulmonary:      Effort: Pulmonary effort is normal.   Chest:   Breasts:      Right: No mass, nipple discharge, skin change or tenderness.      Left: No mass, nipple discharge, skin change or tenderness.       Abdominal:      General: There is no distension.      Palpations: Abdomen is soft.      Tenderness: There is no abdominal tenderness.   Genitourinary:     General: Normal vulva.      Exam position: Lithotomy position.      Labia:         Right: No rash or lesion.         Left: No rash or lesion.       Vagina: Normal. No vaginal discharge or bleeding.      Cervix: No friability, lesion or cervical bleeding.      Uterus: Not enlarged and not tender.       Adnexa:         Right: No mass or tenderness.          Left: No mass or tenderness.        Comments: Exam limited by body habitus   Musculoskeletal:         General: Normal range of motion.      Cervical back: Normal range of motion.   Skin:     General: Skin is warm and dry.      Findings: No rash.   Neurological:      Mental Status: She is alert and oriented to person, place, and time.   Psychiatric:         Behavior: Behavior normal.           Assessment/Plan   Diagnoses and all orders for this visit:    1. Encounter for gynecological examination with abnormal finding (Primary)    2. Screening for blood or protein in urine  -     POC Urinalysis Dipstick    3. Screening for malignant neoplasm of the cervix  -     IgP, Aptima HPV    4. Morbid obesity with BMI of 50.0-59.9, adult (HCC)  -     metFORMIN ER (Glucophage XR) 500 MG 24 hr tablet; Take 2 tablets by mouth Daily With Breakfast.  Dispense: 60 tablet; Refill: 4    5. Abnormal uterine bleeding (AUB)    6. Left ovarian cyst    7. PCOS (polycystic ovarian syndrome)  -     metFORMIN ER (Glucophage XR) 500 MG 24 hr tablet; Take 2 tablets by mouth Daily With Breakfast.  Dispense: 60 tablet; Refill: 4    8. Pelvic pain  -     NuSwab VG+ - Swab, Vagina      Counseling was given to patient for the following  topics: diagnostic results, instructions for management, impressions, risks and benefits of treatment options, importance of treatment compliance and self-breast exams .   Return in about 1 year (around 12/6/2022) for Annual physical.

## 2021-12-08 LAB
A VAGINAE DNA VAG QL NAA+PROBE: NORMAL SCORE
BVAB2 DNA VAG QL NAA+PROBE: NORMAL SCORE
C ALBICANS DNA VAG QL NAA+PROBE: NEGATIVE
C GLABRATA DNA VAG QL NAA+PROBE: NEGATIVE
C TRACH DNA VAG QL NAA+PROBE: NEGATIVE
CYTOLOGIST CVX/VAG CYTO: NORMAL
CYTOLOGY CVX/VAG DOC CYTO: NORMAL
CYTOLOGY CVX/VAG DOC THIN PREP: NORMAL
DX ICD CODE: NORMAL
HIV 1 & 2 AB SER-IMP: NORMAL
HPV I/H RISK 4 DNA CVX QL PROBE+SIG AMP: NEGATIVE
MEGA1 DNA VAG QL NAA+PROBE: NORMAL SCORE
N GONORRHOEA DNA VAG QL NAA+PROBE: NEGATIVE
OTHER STN SPEC: NORMAL
STAT OF ADQ CVX/VAG CYTO-IMP: NORMAL
T VAGINALIS DNA VAG QL NAA+PROBE: NEGATIVE

## 2021-12-08 RX ORDER — FEXOFENADINE HCL 180 MG/1
TABLET ORAL
Qty: 90 TABLET | Refills: 1 | Status: SHIPPED | OUTPATIENT
Start: 2021-12-08 | End: 2022-05-28

## 2022-02-08 RX ORDER — MONTELUKAST SODIUM 10 MG/1
10 TABLET ORAL NIGHTLY
Qty: 90 TABLET | Refills: 1 | Status: SHIPPED | OUTPATIENT
Start: 2022-02-08 | End: 2023-02-14

## 2022-02-08 RX ORDER — MONTELUKAST SODIUM 10 MG/1
10 TABLET ORAL NIGHTLY
Qty: 90 TABLET | Refills: 1 | Status: SHIPPED | OUTPATIENT
Start: 2022-02-08 | End: 2022-02-08 | Stop reason: SDUPTHER

## 2022-02-28 DIAGNOSIS — E66.01 MORBID OBESITY WITH BMI OF 50.0-59.9, ADULT: ICD-10-CM

## 2022-02-28 DIAGNOSIS — E28.2 PCOS (POLYCYSTIC OVARIAN SYNDROME): ICD-10-CM

## 2022-02-28 RX ORDER — METFORMIN HYDROCHLORIDE 500 MG/1
1000 TABLET, EXTENDED RELEASE ORAL
Qty: 180 TABLET | Refills: 1 | Status: SHIPPED | OUTPATIENT
Start: 2022-02-28 | End: 2022-08-22

## 2022-03-08 DIAGNOSIS — N93.9 ABNORMAL UTERINE BLEEDING (AUB): ICD-10-CM

## 2022-03-21 NOTE — TELEPHONE ENCOUNTER
Rx Refill Note  Requested Prescriptions     Pending Prescriptions Disp Refills   • ondansetron (ZOFRAN) 4 MG tablet [Pharmacy Med Name: ONDANSETRON HCL 4 MG TABLET] 12 tablet 4     Sig: TAKE 1 TABLET BY MOUTH EVERY 8 (EIGHT) HOURS AS NEEDED FOR NAUSEA OR VOMITING.      Last office visit with prescribing clinician: 6/18/2021      Next office visit with prescribing clinician: Visit date not found       {TIP  Please add Last Relevant Lab Date if appropriate: 11/05/21    Lizbeth Ceron MA  03/21/22, 08:23 EDT

## 2022-03-22 RX ORDER — ONDANSETRON 4 MG/1
4 TABLET, FILM COATED ORAL EVERY 8 HOURS PRN
Qty: 12 TABLET | Refills: 4 | Status: SHIPPED | OUTPATIENT
Start: 2022-03-22 | End: 2022-12-07

## 2022-05-28 RX ORDER — FEXOFENADINE HCL 180 MG/1
TABLET ORAL
Qty: 90 TABLET | Refills: 1 | Status: SHIPPED | OUTPATIENT
Start: 2022-05-28 | End: 2022-11-18

## 2022-06-02 ENCOUNTER — TELEPHONE (OUTPATIENT)
Dept: FAMILY MEDICINE CLINIC | Facility: CLINIC | Age: 32
End: 2022-06-02

## 2022-06-02 NOTE — TELEPHONE ENCOUNTER
"PATIENT STATED SHE IS EXPERIENCING \"MIGRAINE, DIARREA, NAUSEA, STOMACH ACHES THAT FEEL LIKE SOMETHING IS SCRATCHING HER STOMACH WALL, LOSS OF APPETITE, AND BLOOD IN STOOL.\"    PATIENT REQUESTING SAME DAY APPOINTMENT BUT NO SLOTS AVAILABLE. INFORMED PATIENT WE RECOMMEND GOING TO THE ER BASED ON THE SYMPTOMS SHE IS HAVING. PATIENT VOICED UNDERSTANDING AND CONFIRMED SHE WOULD GO TO THE ER.    PLEASE ADVISE  "

## 2022-06-10 ENCOUNTER — OFFICE VISIT (OUTPATIENT)
Dept: FAMILY MEDICINE CLINIC | Facility: CLINIC | Age: 32
End: 2022-06-10

## 2022-06-10 VITALS
WEIGHT: 293 LBS | HEART RATE: 94 BPM | RESPIRATION RATE: 18 BRPM | OXYGEN SATURATION: 97 % | DIASTOLIC BLOOD PRESSURE: 80 MMHG | TEMPERATURE: 96.3 F | BODY MASS INDEX: 43.4 KG/M2 | HEIGHT: 69 IN | SYSTOLIC BLOOD PRESSURE: 132 MMHG

## 2022-06-10 DIAGNOSIS — K62.5 RECTAL BLEEDING: ICD-10-CM

## 2022-06-10 DIAGNOSIS — R19.7 DIARRHEA, UNSPECIFIED TYPE: Primary | ICD-10-CM

## 2022-06-10 PROCEDURE — 99213 OFFICE O/P EST LOW 20 MIN: CPT | Performed by: NURSE PRACTITIONER

## 2022-06-10 RX ORDER — SUMATRIPTAN 50 MG/1
TABLET, FILM COATED ORAL
Qty: 10 TABLET | Refills: 5 | Status: SHIPPED | OUTPATIENT
Start: 2022-06-10 | End: 2022-12-07

## 2022-06-10 RX ORDER — DICYCLOMINE HYDROCHLORIDE 10 MG/1
CAPSULE ORAL
COMMUNITY
Start: 2022-06-03 | End: 2022-07-06
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
## 2022-06-29 ENCOUNTER — TELEPHONE (OUTPATIENT)
Dept: OBSTETRICS AND GYNECOLOGY | Age: 32
End: 2022-06-29

## 2022-06-29 DIAGNOSIS — N93.9 ABNORMAL UTERINE BLEEDING (AUB): ICD-10-CM

## 2022-06-29 DIAGNOSIS — E28.2 PCOS (POLYCYSTIC OVARIAN SYNDROME): Primary | ICD-10-CM

## 2022-06-29 DIAGNOSIS — L68.0 FEMALE HIRSUTISM: ICD-10-CM

## 2022-06-29 RX ORDER — DROSPIRENONE AND ETHINYL ESTRADIOL 0.03MG-3MG
1 KIT ORAL DAILY
Qty: 84 TABLET | Refills: 3 | Status: SHIPPED | OUTPATIENT
Start: 2022-06-29 | End: 2023-06-29

## 2022-07-06 ENCOUNTER — OFFICE VISIT (OUTPATIENT)
Dept: GASTROENTEROLOGY | Facility: CLINIC | Age: 32
End: 2022-07-06

## 2022-07-06 ENCOUNTER — PATIENT ROUNDING (BHMG ONLY) (OUTPATIENT)
Dept: GASTROENTEROLOGY | Facility: CLINIC | Age: 32
End: 2022-07-06

## 2022-07-06 ENCOUNTER — PREP FOR SURGERY (OUTPATIENT)
Dept: SURGERY | Facility: SURGERY CENTER | Age: 32
End: 2022-07-06

## 2022-07-06 VITALS
BODY MASS INDEX: 43.4 KG/M2 | SYSTOLIC BLOOD PRESSURE: 134 MMHG | DIASTOLIC BLOOD PRESSURE: 88 MMHG | OXYGEN SATURATION: 97 % | TEMPERATURE: 97.6 F | HEIGHT: 69 IN | HEART RATE: 94 BPM | WEIGHT: 293 LBS

## 2022-07-06 DIAGNOSIS — K62.5 RECTAL BLEEDING: Primary | ICD-10-CM

## 2022-07-06 DIAGNOSIS — R19.7 DIARRHEA, UNSPECIFIED TYPE: ICD-10-CM

## 2022-07-06 DIAGNOSIS — Z90.49 HISTORY OF CHOLECYSTECTOMY: ICD-10-CM

## 2022-07-06 PROCEDURE — 99214 OFFICE O/P EST MOD 30 MIN: CPT | Performed by: NURSE PRACTITIONER

## 2022-07-06 RX ORDER — DICYCLOMINE HCL 20 MG
20 TABLET ORAL 3 TIMES DAILY
Qty: 90 TABLET | Refills: 5 | Status: SHIPPED | OUTPATIENT
Start: 2022-07-06 | End: 2022-10-27 | Stop reason: SDUPTHER

## 2022-07-06 RX ORDER — MONTELUKAST SODIUM 10 MG/1
TABLET ORAL
Status: ON HOLD | COMMUNITY
Start: 2016-02-04 | End: 2022-09-14

## 2022-07-06 RX ORDER — SODIUM CHLORIDE 0.9 % (FLUSH) 0.9 %
10 SYRINGE (ML) INJECTION AS NEEDED
Status: CANCELLED | OUTPATIENT
Start: 2022-07-06

## 2022-07-06 RX ORDER — SODIUM CHLORIDE, SODIUM LACTATE, POTASSIUM CHLORIDE, CALCIUM CHLORIDE 600; 310; 30; 20 MG/100ML; MG/100ML; MG/100ML; MG/100ML
30 INJECTION, SOLUTION INTRAVENOUS CONTINUOUS PRN
Status: CANCELLED | OUTPATIENT
Start: 2022-07-06

## 2022-07-06 RX ORDER — ESCITALOPRAM OXALATE 10 MG/1
10 TABLET ORAL DAILY
COMMUNITY
Start: 2022-07-01 | End: 2023-02-13

## 2022-07-06 RX ORDER — SODIUM CHLORIDE 0.9 % (FLUSH) 0.9 %
3 SYRINGE (ML) INJECTION EVERY 12 HOURS SCHEDULED
Status: CANCELLED | OUTPATIENT
Start: 2022-07-06

## 2022-07-06 NOTE — PROGRESS NOTES
"Chief Complaint   Patient presents with   • Rectal Bleeding         History of Present Illness  32 year old female presents today for rectal bleeding and loose stools with urgency.      Rectal bleeding occurs week leading up to menstrual cycle. Blood is bright red in color and painless. Blood is in toilet bowl, toilet tissue and in stool. Blood was described as a larger amount.      She was prescribed dicyclomine recently and this helped with urgency and frequency.  She does not have any more of the pills left.    She has been told she has internal hemorrhoids on previous colonoscopy in approx 2018 with LGA.     Previous CCK in 2010. Since that time she has had loose stools or diarrhea. She has tried welchol in the past with no improvement.     She is up to date with GYN and has PCOS.     She reports numerous food allergies and sensitivities. Also has anxiety and stress and past traumas that may contribute to symptoms.      Result Review :    \plain         Vital Signs:   /88   Pulse 94   Temp 97.6 °F (36.4 °C)   Ht 175.3 cm (69\")   Wt (!) 167 kg (368 lb)   SpO2 97%   BMI 54.34 kg/m²     Body mass index is 54.34 kg/m².     Physical Exam  Vitals reviewed.   Constitutional:       General: She is not in acute distress.     Appearance: Normal appearance. She is not ill-appearing, toxic-appearing or diaphoretic.   Abdominal:      General: Bowel sounds are normal. There is no distension.      Palpations: Abdomen is soft. Abdomen is not rigid. There is no pulsatile mass.      Tenderness: There is no abdominal tenderness. There is no guarding.   Neurological:      Mental Status: She is alert.       Assessment and Plan    Diagnoses and all orders for this visit:    1. Rectal bleeding (Primary)    2. Diarrhea, unspecified type  -     dicyclomine (BENTYL) 20 MG tablet; Take 1 tablet by mouth 3 (Three) Times a Day.  Dispense: 90 tablet; Refill: 5    3. History of cholecystectomy    Will refill dicyclomine, will " increase dose to 20 mg up to 3 times daily.  If we need to increase up to 4 times daily, patient is agreeable to contact the office and we can send in prescription accordingly.    Recommend colonoscopy to evaluate for rectal bleeding and look for alternative causes for diarrhea and urgency.    Recommend follow-up after colonoscopy to review results and any ongoing symptoms.  To consider additional bile sequestering agent such as colestipol and/or Xifaxan.  Discussed multiple causes for symptoms including bile acid diarrhea, postcholecystectomy changes, irritable bowel syndrome, trauma and anxiety as well.      EMR Dragon/Transcription Disclaimer:  This document has been Dictated utilizing Dragon dictation.

## 2022-07-06 NOTE — PROGRESS NOTES
July 6, 2022    Hello, may I speak with Lanie Moctezuma?    My name is Roxanna    I am  with [facility]  [address] [address] [phone].    Before we get started may I verify your date of birth? 1990 -- verified    I am calling to officially welcome you to our practice and ask about your recent visit. Is this a good time to talk? yes    Tell me about your visit with us. What things went well? It was excellent. No complaints.       We're always looking for ways to make our patients' experiences even better. Do you have recommendations on ways we may improve?  no    Overall were you satisfied with your first visit to our practice? yes     I appreciate you taking the time to speak with me today. Is there anything else I can do for you? no      Thank you, and have a great day.

## 2022-08-22 DIAGNOSIS — E66.01 MORBID OBESITY WITH BMI OF 50.0-59.9, ADULT: ICD-10-CM

## 2022-08-22 DIAGNOSIS — E28.2 PCOS (POLYCYSTIC OVARIAN SYNDROME): ICD-10-CM

## 2022-08-22 RX ORDER — MONTELUKAST SODIUM 10 MG/1
TABLET ORAL
Qty: 90 TABLET | Refills: 1 | Status: ON HOLD | OUTPATIENT
Start: 2022-08-22 | End: 2022-09-14

## 2022-08-22 RX ORDER — METFORMIN HYDROCHLORIDE 500 MG/1
1000 TABLET, EXTENDED RELEASE ORAL
Qty: 180 TABLET | Refills: 1 | Status: SHIPPED | OUTPATIENT
Start: 2022-08-22 | End: 2022-09-12 | Stop reason: SDUPTHER

## 2022-09-12 DIAGNOSIS — E66.01 MORBID OBESITY WITH BMI OF 50.0-59.9, ADULT: ICD-10-CM

## 2022-09-12 DIAGNOSIS — E28.2 PCOS (POLYCYSTIC OVARIAN SYNDROME): ICD-10-CM

## 2022-09-12 RX ORDER — METFORMIN HYDROCHLORIDE 500 MG/1
1000 TABLET, EXTENDED RELEASE ORAL
Qty: 180 TABLET | Refills: 1 | Status: SHIPPED | OUTPATIENT
Start: 2022-09-12 | End: 2023-03-06

## 2022-09-13 ENCOUNTER — ANESTHESIA EVENT (OUTPATIENT)
Dept: PERIOP | Facility: HOSPITAL | Age: 32
End: 2022-09-13

## 2022-09-13 RX ORDER — FAMOTIDINE 10 MG
10 TABLET ORAL 2 TIMES DAILY PRN
COMMUNITY

## 2022-09-14 ENCOUNTER — HOSPITAL ENCOUNTER (OUTPATIENT)
Facility: HOSPITAL | Age: 32
Setting detail: HOSPITAL OUTPATIENT SURGERY
Discharge: HOME | End: 2022-09-14
Attending: INTERNAL MEDICINE | Admitting: INTERNAL MEDICINE

## 2022-09-14 ENCOUNTER — ANESTHESIA (OUTPATIENT)
Dept: PERIOP | Facility: HOSPITAL | Age: 32
End: 2022-09-14

## 2022-09-14 VITALS
SYSTOLIC BLOOD PRESSURE: 133 MMHG | BODY MASS INDEX: 52.19 KG/M2 | WEIGHT: 293 LBS | HEART RATE: 79 BPM | TEMPERATURE: 98.1 F | OXYGEN SATURATION: 100 % | DIASTOLIC BLOOD PRESSURE: 77 MMHG | RESPIRATION RATE: 16 BRPM

## 2022-09-14 DIAGNOSIS — K62.5 RECTAL BLEEDING: ICD-10-CM

## 2022-09-14 DIAGNOSIS — R19.7 DIARRHEA, UNSPECIFIED TYPE: ICD-10-CM

## 2022-09-14 PROCEDURE — 45380 COLONOSCOPY AND BIOPSY: CPT | Performed by: INTERNAL MEDICINE

## 2022-09-14 PROCEDURE — 25010000002 PROPOFOL 200 MG/20ML EMULSION: Performed by: NURSE ANESTHETIST, CERTIFIED REGISTERED

## 2022-09-14 PROCEDURE — 88305 TISSUE EXAM BY PATHOLOGIST: CPT | Performed by: INTERNAL MEDICINE

## 2022-09-14 RX ORDER — SODIUM CHLORIDE 9 MG/ML
40 INJECTION, SOLUTION INTRAVENOUS AS NEEDED
Status: DISCONTINUED | OUTPATIENT
Start: 2022-09-14 | End: 2022-09-14 | Stop reason: HOSPADM

## 2022-09-14 RX ORDER — LIDOCAINE HYDROCHLORIDE 10 MG/ML
0.5 INJECTION, SOLUTION EPIDURAL; INFILTRATION; INTRACAUDAL; PERINEURAL ONCE AS NEEDED
Status: DISCONTINUED | OUTPATIENT
Start: 2022-09-14 | End: 2022-09-14 | Stop reason: HOSPADM

## 2022-09-14 RX ORDER — SODIUM CHLORIDE 0.9 % (FLUSH) 0.9 %
10 SYRINGE (ML) INJECTION AS NEEDED
Status: DISCONTINUED | OUTPATIENT
Start: 2022-09-14 | End: 2022-09-14 | Stop reason: HOSPADM

## 2022-09-14 RX ORDER — PROPOFOL 10 MG/ML
INJECTION, EMULSION INTRAVENOUS AS NEEDED
Status: DISCONTINUED | OUTPATIENT
Start: 2022-09-14 | End: 2022-09-14 | Stop reason: SURG

## 2022-09-14 RX ORDER — SODIUM CHLORIDE 0.9 % (FLUSH) 0.9 %
10 SYRINGE (ML) INJECTION EVERY 12 HOURS SCHEDULED
Status: DISCONTINUED | OUTPATIENT
Start: 2022-09-14 | End: 2022-09-14 | Stop reason: HOSPADM

## 2022-09-14 RX ORDER — SODIUM CHLORIDE, SODIUM LACTATE, POTASSIUM CHLORIDE, CALCIUM CHLORIDE 600; 310; 30; 20 MG/100ML; MG/100ML; MG/100ML; MG/100ML
9 INJECTION, SOLUTION INTRAVENOUS CONTINUOUS
Status: DISCONTINUED | OUTPATIENT
Start: 2022-09-14 | End: 2022-09-14 | Stop reason: HOSPADM

## 2022-09-14 RX ORDER — LIDOCAINE HYDROCHLORIDE 20 MG/ML
INJECTION, SOLUTION INFILTRATION; PERINEURAL AS NEEDED
Status: DISCONTINUED | OUTPATIENT
Start: 2022-09-14 | End: 2022-09-14 | Stop reason: SURG

## 2022-09-14 RX ORDER — SODIUM CHLORIDE 0.9 % (FLUSH) 0.9 %
3 SYRINGE (ML) INJECTION EVERY 12 HOURS SCHEDULED
Status: DISCONTINUED | OUTPATIENT
Start: 2022-09-14 | End: 2022-09-14 | Stop reason: HOSPADM

## 2022-09-14 RX ORDER — SODIUM CHLORIDE, SODIUM LACTATE, POTASSIUM CHLORIDE, CALCIUM CHLORIDE 600; 310; 30; 20 MG/100ML; MG/100ML; MG/100ML; MG/100ML
30 INJECTION, SOLUTION INTRAVENOUS CONTINUOUS PRN
Status: DISCONTINUED | OUTPATIENT
Start: 2022-09-14 | End: 2022-09-14 | Stop reason: HOSPADM

## 2022-09-14 RX ADMIN — PROPOFOL 50 MG: 10 INJECTION, EMULSION INTRAVENOUS at 11:32

## 2022-09-14 RX ADMIN — PROPOFOL 50 MG: 10 INJECTION, EMULSION INTRAVENOUS at 11:35

## 2022-09-14 RX ADMIN — SODIUM CHLORIDE, POTASSIUM CHLORIDE, SODIUM LACTATE AND CALCIUM CHLORIDE 9 ML/HR: 600; 310; 30; 20 INJECTION, SOLUTION INTRAVENOUS at 10:50

## 2022-09-14 RX ADMIN — LIDOCAINE HYDROCHLORIDE 100 MG: 20 INJECTION, SOLUTION INFILTRATION; PERINEURAL at 11:28

## 2022-09-14 RX ADMIN — PROPOFOL 100 MG: 10 INJECTION, EMULSION INTRAVENOUS at 11:28

## 2022-09-14 NOTE — ANESTHESIA POSTPROCEDURE EVALUATION
Patient: Lanie Moctezuma    Procedure Summary     Date: 09/14/22 Room / Location: [facility] ENDOSCOPY 2 /  [facility]    Anesthesia Start: 1127 Anesthesia Stop: 1140    Procedure: COLONOSCOPY WITH BIOPSY (N/A ) Diagnosis:       Rectal bleeding      Diarrhea, unspecified type      (Rectal bleeding [K62.5])      (Diarrhea, unspecified type [R19.7])    Surgeons: Blayne George MD Provider: Vikram Ibarra CRNA    Anesthesia Type: MAC ASA Status: 3          Anesthesia Type: MAC    Vitals  No vitals data found for the desired time range.          Post Anesthesia Care and Evaluation    Patient location during evaluation: PHASE II  Patient participation: complete - patient participated  Level of consciousness: awake and alert  Pain score: 0  Pain management: adequate    Airway patency: patent  Anesthetic complications: No anesthetic complications  PONV Status: none  Cardiovascular status: acceptable  Respiratory status: acceptable  Hydration status: acceptable
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
## 2022-09-14 NOTE — ANESTHESIA PREPROCEDURE EVALUATION
Anesthesia Evaluation     Patient summary reviewed and Nursing notes reviewed   no history of anesthetic complications:  NPO Solid Status: > 8 hours  NPO Liquid Status: > 8 hours           Airway   Mallampati: III  TM distance: <3 FB  Neck ROM: full  Large neck circumference and Possible difficult intubation  Dental      Pulmonary - normal exam   (+) asthma,sleep apnea,   (-) shortness of breath  Cardiovascular - normal exam    ECG reviewed    (-) angina      Neuro/Psych  (+) headaches, psychiatric history Anxiety and Depression,    GI/Hepatic/Renal/Endo    (+) morbid obesity, GERD well controlled, GI bleeding lower resolved,     Musculoskeletal (-) negative ROS    Abdominal   (+) obese,    Substance History   (+) alcohol use,      OB/GYN negative ob/gyn ROS         Other                        Anesthesia Plan    ASA 3     MAC     intravenous induction     Anesthetic plan, risks, benefits, and alternatives have been provided, discussed and informed consent has been obtained with: patient.    Use of blood products discussed with patient  Consented to blood products.       CODE STATUS:

## 2022-09-16 LAB
LAB AP CASE REPORT: NORMAL
PATH REPORT.FINAL DX SPEC: NORMAL
PATH REPORT.GROSS SPEC: NORMAL

## 2022-10-27 ENCOUNTER — OFFICE VISIT (OUTPATIENT)
Dept: GASTROENTEROLOGY | Facility: CLINIC | Age: 32
End: 2022-10-27

## 2022-10-27 VITALS
TEMPERATURE: 97.2 F | HEART RATE: 101 BPM | SYSTOLIC BLOOD PRESSURE: 139 MMHG | DIASTOLIC BLOOD PRESSURE: 94 MMHG | OXYGEN SATURATION: 98 %

## 2022-10-27 DIAGNOSIS — Z90.49 HISTORY OF CHOLECYSTECTOMY: ICD-10-CM

## 2022-10-27 DIAGNOSIS — K62.5 RECTAL BLEEDING: Primary | ICD-10-CM

## 2022-10-27 DIAGNOSIS — R19.7 DIARRHEA, UNSPECIFIED TYPE: ICD-10-CM

## 2022-10-27 PROCEDURE — 99214 OFFICE O/P EST MOD 30 MIN: CPT | Performed by: NURSE PRACTITIONER

## 2022-10-27 RX ORDER — MONTELUKAST SODIUM 4 MG/1
2 TABLET, CHEWABLE ORAL 2 TIMES DAILY
Qty: 120 TABLET | Refills: 4 | Status: SHIPPED | OUTPATIENT
Start: 2022-10-27 | End: 2023-02-13 | Stop reason: SDUPTHER

## 2022-10-27 RX ORDER — ESCITALOPRAM OXALATE 20 MG/1
20 TABLET ORAL DAILY
COMMUNITY
Start: 2022-09-11

## 2022-10-27 RX ORDER — DICYCLOMINE HCL 20 MG
20 TABLET ORAL 3 TIMES DAILY
Qty: 90 TABLET | Refills: 5 | Status: SHIPPED | OUTPATIENT
Start: 2022-10-27 | End: 2023-02-13 | Stop reason: SDUPTHER

## 2022-10-27 RX ORDER — HYDROCORTISONE ACETATE 25 MG/1
25 SUPPOSITORY RECTAL DAILY
Qty: 20 SUPPOSITORY | Refills: 2 | Status: SHIPPED | OUTPATIENT
Start: 2022-10-27 | End: 2022-11-01

## 2022-10-27 NOTE — PROGRESS NOTES
Chief Complaint   Patient presents with   • Follow-up     Review recent colonoscopy           History of Present Illness  32-year-old female presents today for follow-up after colonoscopy September 14, 2022.  Initial consult July 6, 2022 for rectal bleeding, diarrhea and urgency.  She is status postcholecystectomy in 2010.    She has used dicyclomine in the past to help with diarrhea and urgency.  This was refilled at her last office visit.  She is currently taking dicyclomine 20 mg twice daily and will take an extra dose as needed.  She has 3-5 bowel movements per day.  She will have nocturnal bowel movements.  She can experience worsening symptoms a week prior and during her menstrual cycle.  This is also when she will experience rectal bleeding described as bright red.    Prior use of WelChol did not provide improvement in diarrhea and urgency.    She is up-to-date with GYN and has PCOS.    She has numerous food allergies and sensitivity.  Also anxiety and stress that can contribute to symptoms.     Review of Systems      Result Review :       COLONOSCOPY (09/14/2022 11:17)   Tissue Pathology Exam (09/14/2022 11:33)       Vital Signs:   /94   Pulse 101   Temp 97.2 °F (36.2 °C)   SpO2 98%     There is no height or weight on file to calculate BMI.     Physical Exam  Vitals reviewed.   Constitutional:       General: She is not in acute distress.     Appearance: She is well-developed. She is not ill-appearing.   Pulmonary:      Effort: No respiratory distress.   Skin:     Coloration: Skin is not pale.   Neurological:      Mental Status: She is alert.           Assessment and Plan    Diagnoses and all orders for this visit:    1. Rectal bleeding (Primary)  -     hydrocortisone (ANUSOL-HC) 25 MG suppository; Insert 1 suppository into the rectum Daily.  Dispense: 20 suppository; Refill: 2    2. History of cholecystectomy  -     colestipol (COLESTID) 1 g tablet; Take 2 tablets by mouth 2 (Two) Times a Day.   Dispense: 120 tablet; Refill: 4    3. Diarrhea, unspecified type  -     colestipol (COLESTID) 1 g tablet; Take 2 tablets by mouth 2 (Two) Times a Day.  Dispense: 120 tablet; Refill: 4  -     dicyclomine (BENTYL) 20 MG tablet; Take 1 tablet by mouth 3 (Three) Times a Day.  Dispense: 90 tablet; Refill: 5       Reviewed recent colonoscopy, nonbleeding internal hemorrhoids.  No cause for diarrhea noted, biopsies with no evidence of microscopic or lymphocytic colitis.    She has been treated in the past with WelChol but this did not provide improvement in symptoms.  Discussed postcholecystectomy diarrhea as she has had looser stools since her gallbladder removal in 2010.  Would like to start bile sequestering agent, adjusting dose to see if there is component of bile acid diarrhea as well as bile acid IBS.    Encourage patient to use dicyclomine up to 3 times daily the week before and during her menstrual cycle as well as during stressful episodes.    Will start hydrocortisone suppositories for rectal bleeding as she notices increased frequency and diarrhea prior to and during her menstrual cycles which can cause rectal bleeding, mild hemorrhoids on colonoscopy.  Our goal is to decrease frequency of bowel movements to help prevent episodes of rectal bleeding.    Follow-up visit in 4 months, sooner if symptoms change or condition warrants.      EMR Dragon/Transcription Disclaimer:  This document has been Dictated utilizing Dragon dictation.

## 2022-11-01 ENCOUNTER — TELEPHONE (OUTPATIENT)
Dept: GASTROENTEROLOGY | Facility: CLINIC | Age: 32
End: 2022-11-01

## 2022-11-01 RX ORDER — HYDROCORTISONE 25 MG/G
CREAM TOPICAL
Qty: 30 G | Refills: 1 | Status: SHIPPED | OUTPATIENT
Start: 2022-11-01

## 2022-11-01 NOTE — TELEPHONE ENCOUNTER
hydrocortisone (ANUSOL-HC) 25 MG suppository  Not approved (FDA has stated not effective).   1% and 2.5% creams are covered and approved.  Asking if RX can be changed to a cream instead of suppository?   Thank you

## 2022-11-18 RX ORDER — FEXOFENADINE HCL 180 MG/1
TABLET ORAL
Qty: 90 TABLET | Refills: 1 | Status: SHIPPED | OUTPATIENT
Start: 2022-11-18

## 2022-12-07 RX ORDER — ONDANSETRON 4 MG/1
TABLET, FILM COATED ORAL
Qty: 12 TABLET | Refills: 4 | Status: SHIPPED | OUTPATIENT
Start: 2022-12-07

## 2022-12-07 RX ORDER — SUMATRIPTAN 50 MG/1
TABLET, FILM COATED ORAL
Qty: 9 TABLET | Refills: 6 | Status: SHIPPED | OUTPATIENT
Start: 2022-12-07

## 2022-12-12 ENCOUNTER — TELEPHONE (OUTPATIENT)
Dept: OBSTETRICS AND GYNECOLOGY | Age: 32
End: 2022-12-12

## 2022-12-12 NOTE — TELEPHONE ENCOUNTER
Caller: Lanie Moctezuma    Relationship to patient: Self    Best call back number: [phone]    PT SAME DAY CANCEL. HAS RSV.

## 2023-02-13 ENCOUNTER — OFFICE VISIT (OUTPATIENT)
Dept: GASTROENTEROLOGY | Facility: CLINIC | Age: 33
End: 2023-02-13
Payer: COMMERCIAL

## 2023-02-13 VITALS
DIASTOLIC BLOOD PRESSURE: 72 MMHG | SYSTOLIC BLOOD PRESSURE: 140 MMHG | HEART RATE: 101 BPM | WEIGHT: 293 LBS | BODY MASS INDEX: 43.4 KG/M2 | OXYGEN SATURATION: 96 % | HEIGHT: 69 IN | TEMPERATURE: 96.9 F

## 2023-02-13 DIAGNOSIS — Z90.49 HISTORY OF CHOLECYSTECTOMY: ICD-10-CM

## 2023-02-13 DIAGNOSIS — R19.7 DIARRHEA, UNSPECIFIED TYPE: Primary | ICD-10-CM

## 2023-02-13 DIAGNOSIS — K58.0 IRRITABLE BOWEL SYNDROME WITH DIARRHEA: ICD-10-CM

## 2023-02-13 PROCEDURE — 99214 OFFICE O/P EST MOD 30 MIN: CPT | Performed by: NURSE PRACTITIONER

## 2023-02-13 RX ORDER — MONTELUKAST SODIUM 4 MG/1
2 TABLET, CHEWABLE ORAL DAILY
Qty: 180 TABLET | Refills: 3 | Status: SHIPPED | OUTPATIENT
Start: 2023-02-13

## 2023-02-13 RX ORDER — DICYCLOMINE HCL 20 MG
20 TABLET ORAL 3 TIMES DAILY
Qty: 270 TABLET | Refills: 3 | Status: SHIPPED | OUTPATIENT
Start: 2023-02-13

## 2023-02-13 NOTE — PROGRESS NOTES
"Chief Complaint   Patient presents with   • Follow-up     Diarrhea and rectal bleeding           History of Present Illness  33-year-old female presents today for follow-up.    Last office visit October 27, 2022.  She is status postcholecystectomy 2010.    She is currently taking colestipol one per day.  This has helped with diarrhea or more formed stools but she still can experience urgency at times.  Higher doses of colestipol causes constipation.    She is currently using dicyclomine 2-3 times per day.   She can have urgency at times.     She has diagnosis of PCOS and is up-to-date with GYN.    She has numerous food allergies and sensitivities.    Also symptoms are exacerbated with stress and anxiety.    She will experience intermittent rectal bleeding that is worse during diarrhea and prior to and during her menstrual cycle.   She has rectal cream but does not remember to use this regularly.     Prior use of WelChol did not provide improvement in diarrhea and urgency.       Vital Signs:   /72   Pulse 101   Temp 96.9 °F (36.1 °C)   Ht 175.3 cm (69\")   Wt (!) 170 kg (374 lb 14.4 oz)   SpO2 96%   BMI 55.36 kg/m²     Body mass index is 55.36 kg/m².     Physical Exam  Vitals reviewed.   Constitutional:       General: She is not in acute distress.     Appearance: Normal appearance. She is well-developed. She is not ill-appearing.   Pulmonary:      Effort: No respiratory distress.   Abdominal:      General: There is no distension.      Palpations: Abdomen is soft.      Tenderness: There is no abdominal tenderness. There is no guarding.   Skin:     Coloration: Skin is not pale.   Neurological:      Mental Status: She is alert.         Assessment and Plan    Diagnoses and all orders for this visit:    1. Diarrhea, unspecified type (Primary)  -     colestipol (COLESTID) 1 g tablet; Take 2 tablets by mouth Daily.  Dispense: 180 tablet; Refill: 3  -     dicyclomine (BENTYL) 20 MG tablet; Take 1 tablet by mouth 3 " (Three) Times a Day.  Dispense: 270 tablet; Refill: 3    2. Irritable bowel syndrome with diarrhea    3. History of cholecystectomy  -     colestipol (COLESTID) 1 g tablet; Take 2 tablets by mouth Daily.  Dispense: 180 tablet; Refill: 3       Diarrhea and urgency with IBS and gallbladder removal,  She has noticed improvement with colestipol, ok to use 1/2 pill as times with one pill daily.     Refill colestipol, adjust dose to control symptoms    Continue dicyclomine, adjust dose to control symptoms    Continue rectal cream as needed for rectal bleeding    Follow up once per year for refills, sooner if symptoms change or condition warrants          Patient Instructions   Refill colestipol, adjust dose to control symptoms    Continue dicyclomine, adjust dose to control symptoms    Continue rectal cream as needed for rectal bleeding    Follow up once per year for refills, sooner if symptoms change or condition warrants          EMR Dragon/Transcription Disclaimer:  This document has been Dictated utilizing Dragon dictation.

## 2023-02-13 NOTE — PATIENT INSTRUCTIONS
Refill colestipol, adjust dose to control symptoms    Continue dicyclomine, adjust dose to control symptoms    Continue rectal cream as needed for rectal bleeding    Follow up once per year for refills, sooner if symptoms change or condition warrants

## 2023-02-13 NOTE — TELEPHONE ENCOUNTER
Rx Refill Note  Requested Prescriptions     Pending Prescriptions Disp Refills   • montelukast (SINGULAIR) 10 MG tablet [Pharmacy Med Name: MONTELUKAST SOD 10 MG TABLET] 90 tablet 1     Sig: TAKE 1 TABLET BY MOUTH EVERY DAY AT NIGHT      Last office visit with prescribing clinician: 6/10/2022   Last telemedicine visit with prescribing clinician: Visit date not found   Next office visit with prescribing clinician: Visit date not found                         Would you like a call back once the refill request has been completed: [] Yes [] No    If the office needs to give you a call back, can they leave a voicemail: [] Yes [] No    Katarzyna Feliz MA  02/13/23, 15:36 EST

## 2023-02-14 RX ORDER — MONTELUKAST SODIUM 10 MG/1
TABLET ORAL
Qty: 90 TABLET | Refills: 1 | Status: SHIPPED | OUTPATIENT
Start: 2023-02-14

## 2023-03-04 DIAGNOSIS — E66.01 MORBID OBESITY WITH BMI OF 50.0-59.9, ADULT: ICD-10-CM

## 2023-03-04 DIAGNOSIS — E28.2 PCOS (POLYCYSTIC OVARIAN SYNDROME): ICD-10-CM

## 2023-03-06 RX ORDER — METFORMIN HYDROCHLORIDE 500 MG/1
1000 TABLET, EXTENDED RELEASE ORAL
Qty: 180 TABLET | Refills: 1 | Status: SHIPPED | OUTPATIENT
Start: 2023-03-06

## 2023-04-12 ENCOUNTER — OFFICE VISIT (OUTPATIENT)
Dept: FAMILY MEDICINE CLINIC | Facility: CLINIC | Age: 33
End: 2023-04-12
Payer: COMMERCIAL

## 2023-04-12 VITALS
HEART RATE: 103 BPM | OXYGEN SATURATION: 98 % | SYSTOLIC BLOOD PRESSURE: 138 MMHG | RESPIRATION RATE: 18 BRPM | BODY MASS INDEX: 45.99 KG/M2 | HEIGHT: 67 IN | TEMPERATURE: 96.6 F | DIASTOLIC BLOOD PRESSURE: 80 MMHG | WEIGHT: 293 LBS

## 2023-04-12 DIAGNOSIS — Z13.1 SCREENING FOR DIABETES MELLITUS: ICD-10-CM

## 2023-04-12 DIAGNOSIS — Z00.00 ANNUAL PHYSICAL EXAM: Primary | ICD-10-CM

## 2023-04-12 DIAGNOSIS — Z13.29 SCREENING FOR THYROID DISORDER: ICD-10-CM

## 2023-04-12 DIAGNOSIS — E55.9 VITAMIN D DEFICIENCY: ICD-10-CM

## 2023-04-12 DIAGNOSIS — Z13.220 SCREENING FOR LIPOID DISORDERS: ICD-10-CM

## 2023-04-12 PROCEDURE — 99395 PREV VISIT EST AGE 18-39: CPT | Performed by: NURSE PRACTITIONER

## 2023-04-12 RX ORDER — ESCITALOPRAM OXALATE 10 MG/1
TABLET ORAL
COMMUNITY
Start: 2023-03-27

## 2023-04-12 NOTE — PROGRESS NOTES
"Chief Complaint  Annual Exam and Foot Swelling (Foot pain left heel, x 4 weeks)    Subjective        Lanie Moctezuma presents to [facility]  History of Present Illness  Lanie Moctezuma is a 33 y.o. female who presents to the clinic for an annual exam and left heel pain for 4 weeks.    The patient reports she has been doing well overall. She notes she had a \"bad stomach\" approximately 1 to 2 weeks ago. The patient continues to work from home at [employer]. She enjoys working from home. The patient reports she has been sleeping more lately. She attributes this to recent illness. The patient drinks plenty of water. The patient's blood pressure is slightly elevated today. She attributes this to white coat syndrome. The patient requests to check her vitamin D levels. She continues to take 9000 units of vitamin D daily.    The patient describes experiencing constant left foot heel pain, and swelling in her ankles. She reports she has been working more frequently and sitting for a long period of time. She inquires if her symptoms are related to a bone spur. She notes she is bad about wearing shoes since she works remotely.       The patient reports experiencing a sinus flare up with her allergies. She describes experiencing nasal congestion and rhinorrhea. She notes the slightest change in weather causes her to have fatigue, nausea, headaches, and facial pain. The patient experiences dizziness when driving down into a valley. She uses a nasal spray, but discontinued this last week due to increased bleeding. The patient experiences a consistent postnasal drip. She notes she is using Arm and Hammer Simply Saline Extra Strength Plus Calming Eucalyptus with some improvement. She takes Allegra, Singular, and Benadryl daily, which helps keep her asthma under control.    The patient plans to decrease her Lexapro dose.    The patient contracted COVID-19 in 01/2022.    The patient declines a pneumonia " "vaccine today. She received her COVID-19 booster on 11/19/2022.The patient saw her dentist on 04/11/2023, and her gums have been bleeding more in the last week. She attributes this to her sinuses.    The patient has an appointment with her gynecologist, Dr. Copeland, on 05/03/2023. She was recently diagnosed with Polycystic ovary syndrome and was placed on metformin. She notes she is doing well on the metformin extended release medication. She reports she did not do well on the other metformin.    The patient underwent a colonoscopy in 2022 and was diagnosed with Irritable bowel syndrome. She takes colestipol and dicyclomine, which has been beneficial.      Objective   Vital Signs:  /80 (BP Location: Right arm, Patient Position: Sitting, Cuff Size: Adult)   Pulse 103   Temp 96.6 °F (35.9 °C) (Temporal)   Resp 18   Ht 170 cm (66.93\")   Wt (!) 170 kg (374 lb)   SpO2 98%   BMI 58.70 kg/m²   Estimated body mass index is 58.7 kg/m² as calculated from the following:    Height as of this encounter: 170 cm (66.93\").    Weight as of this encounter: 170 kg (374 lb).           A review of systems was performed, and the pertinent positives are noted in the HPI.      Physical Exam   Result Review :        /80 (BP Location: Right arm, Patient Position: Sitting, Cuff Size: Adult)   Pulse 103   Temp 96.6 °F (35.9 °C) (Temporal)   Resp 18   Ht 170 cm (66.93\")   Wt (!) 170 kg (374 lb)   SpO2 98%   BMI 58.70 kg/m²          Assessment and Plan   Diagnoses and all orders for this visit:    1. Annual physical exam (Primary)  -     CBC & Differential  -     Comprehensive Metabolic Panel    2. Screening for diabetes mellitus  -     Hemoglobin A1c    3. Screening for lipoid disorders  -     Lipid Panel With LDL / HDL Ratio    4. Screening for thyroid disorder  -     TSH Rfx On Abnormal To Free T4    5. Vitamin D deficiency  -     Vitamin D 25 hydroxy      Lanie is doing well today. She will complete a full panel of " labs. No refills are needed. Her Pap is scheduled. She has had a colonoscopy. No acute concerns despite or except the heel pain. Recommend exercises, instructions given. Will follow up as needed if it does not improve.       Information/counseling provided to the patient regarding periodic fallon maintenance recommendations, including but not limited to immunizations, diet/exercise/healthy lifestyle, laboratory, and other screenings. BMI is discussed. Appropriate exercise, diet, and weight plans are discussed.           Follow Up   No follow-ups on file.  Patient was given instructions and counseling regarding her condition or for health maintenance advice. Please see specific information pulled into the AVS if appropriate.     Transcribed from ambient dictation for NAGA Carrillo by Mindy Fermin.  04/12/23   15:54 EDT    Patient or patient representative verbalized consent to the visit recording.  I have personally performed the services described in this document as transcribed by the above individual, and it is both accurate and complete.

## 2023-04-13 LAB
25(OH)D3+25(OH)D2 SERPL-MCNC: 98.6 NG/ML (ref 30–100)
ALBUMIN SERPL-MCNC: 4 G/DL (ref 3.5–5.2)
ALBUMIN/GLOB SERPL: 1.3 G/DL
ALP SERPL-CCNC: 91 U/L (ref 39–117)
ALT SERPL-CCNC: 30 U/L (ref 1–33)
AST SERPL-CCNC: 14 U/L (ref 1–32)
BASOPHILS # BLD AUTO: 0.07 10*3/MM3 (ref 0–0.2)
BASOPHILS NFR BLD AUTO: 0.6 % (ref 0–1.5)
BILIRUB SERPL-MCNC: <0.2 MG/DL (ref 0–1.2)
BUN SERPL-MCNC: 10 MG/DL (ref 6–20)
BUN/CREAT SERPL: 12.8 (ref 7–25)
CALCIUM SERPL-MCNC: 10.3 MG/DL (ref 8.6–10.5)
CHLORIDE SERPL-SCNC: 100 MMOL/L (ref 98–107)
CHOLEST SERPL-MCNC: 254 MG/DL (ref 0–200)
CO2 SERPL-SCNC: 24.5 MMOL/L (ref 22–29)
CREAT SERPL-MCNC: 0.78 MG/DL (ref 0.57–1)
EGFRCR SERPLBLD CKD-EPI 2021: 102.4 ML/MIN/1.73
EOSINOPHIL # BLD AUTO: 0.16 10*3/MM3 (ref 0–0.4)
EOSINOPHIL NFR BLD AUTO: 1.4 % (ref 0.3–6.2)
ERYTHROCYTE [DISTWIDTH] IN BLOOD BY AUTOMATED COUNT: 15.1 % (ref 12.3–15.4)
GLOBULIN SER CALC-MCNC: 3 GM/DL
GLUCOSE SERPL-MCNC: 79 MG/DL (ref 65–99)
HBA1C MFR BLD: 5.9 % (ref 4.8–5.6)
HCT VFR BLD AUTO: 40.4 % (ref 34–46.6)
HDLC SERPL-MCNC: 69 MG/DL (ref 40–60)
HGB BLD-MCNC: 13.2 G/DL (ref 12–15.9)
IMM GRANULOCYTES # BLD AUTO: 0.06 10*3/MM3 (ref 0–0.05)
IMM GRANULOCYTES NFR BLD AUTO: 0.5 % (ref 0–0.5)
LDLC SERPL CALC-MCNC: 119 MG/DL (ref 0–100)
LDLC/HDLC SERPL: 1.57 {RATIO}
LYMPHOCYTES # BLD AUTO: 2.12 10*3/MM3 (ref 0.7–3.1)
LYMPHOCYTES NFR BLD AUTO: 18.9 % (ref 19.6–45.3)
MCH RBC QN AUTO: 27.3 PG (ref 26.6–33)
MCHC RBC AUTO-ENTMCNC: 32.7 G/DL (ref 31.5–35.7)
MCV RBC AUTO: 83.6 FL (ref 79–97)
MONOCYTES # BLD AUTO: 0.57 10*3/MM3 (ref 0.1–0.9)
MONOCYTES NFR BLD AUTO: 5.1 % (ref 5–12)
NEUTROPHILS # BLD AUTO: 8.25 10*3/MM3 (ref 1.7–7)
NEUTROPHILS NFR BLD AUTO: 73.5 % (ref 42.7–76)
NRBC BLD AUTO-RTO: 0 /100 WBC (ref 0–0.2)
PLATELET # BLD AUTO: 371 10*3/MM3 (ref 140–450)
POTASSIUM SERPL-SCNC: 4.7 MMOL/L (ref 3.5–5.2)
PROT SERPL-MCNC: 7 G/DL (ref 6–8.5)
RBC # BLD AUTO: 4.83 10*6/MM3 (ref 3.77–5.28)
SODIUM SERPL-SCNC: 137 MMOL/L (ref 136–145)
TRIGL SERPL-MCNC: 384 MG/DL (ref 0–150)
TSH SERPL DL<=0.005 MIU/L-ACNC: 0.78 UIU/ML (ref 0.27–4.2)
VLDLC SERPL CALC-MCNC: 66 MG/DL (ref 5–40)
WBC # BLD AUTO: 11.23 10*3/MM3 (ref 3.4–10.8)

## 2023-04-26 ENCOUNTER — OFFICE VISIT (OUTPATIENT)
Dept: FAMILY MEDICINE CLINIC | Facility: CLINIC | Age: 33
End: 2023-04-26
Payer: COMMERCIAL

## 2023-04-26 VITALS
BODY MASS INDEX: 45.99 KG/M2 | SYSTOLIC BLOOD PRESSURE: 136 MMHG | DIASTOLIC BLOOD PRESSURE: 78 MMHG | RESPIRATION RATE: 18 BRPM | OXYGEN SATURATION: 96 % | HEIGHT: 67 IN | HEART RATE: 94 BPM | TEMPERATURE: 96.4 F | WEIGHT: 293 LBS

## 2023-04-26 DIAGNOSIS — Z71.3 ENCOUNTER FOR WEIGHT LOSS COUNSELING: Primary | ICD-10-CM

## 2023-04-26 RX ORDER — SEMAGLUTIDE 0.5 MG/.5ML
0.5 INJECTION, SOLUTION SUBCUTANEOUS WEEKLY
Qty: 2 ML | Refills: 0 | Status: SHIPPED | OUTPATIENT
Start: 2023-04-26

## 2023-04-26 NOTE — PROGRESS NOTES
"Chief Complaint  Weight Loss    Subjective        Lanie Moctezuma presents to [facility]  History of Present Illness  Lanie Moctezuma is a 33-year-old female who presents today for weight loss management. Her current weight is 372 pounds. She is accompanied by an adult female.    The patient reports she is doing well. She adds she researched the Wegovy. She inquires if the pen will last for 1 month. She inquires, if she can be given Wegovy injection on her legs as well. She inquires, if she should weigh herself 1 time a month. She states she would rather replace muscle mass again. She inquires what other options are besides Wegovy. She notes she has been having a hard time tolerating red meat. She adds she has been slowly transitioning to more vegetables.   Objective   Vital Signs:  /78 (BP Location: Left arm, Patient Position: Sitting, Cuff Size: Adult)   Pulse 94   Temp 96.4 °F (35.8 °C) (Temporal)   Resp 18   Ht 170 cm (66.93\")   Wt (!) 169 kg (372 lb 3.2 oz)   SpO2 96%   BMI 58.42 kg/m²   Estimated body mass index is 58.42 kg/m² as calculated from the following:    Height as of this encounter: 170 cm (66.93\").    Weight as of this encounter: 169 kg (372 lb 3.2 oz).           A review of systems was performed, and the pertinent positives are noted in the HPI.    Physical Exam  Vitals reviewed.   Constitutional:       Appearance: Normal appearance.   Cardiovascular:      Rate and Rhythm: Normal rate and regular rhythm.      Heart sounds: No murmur heard.    No friction rub. No gallop.   Pulmonary:      Effort: Pulmonary effort is normal. No respiratory distress.      Breath sounds: Normal breath sounds. No wheezing, rhonchi or rales.   Skin:     General: Skin is warm and dry.   Neurological:      Mental Status: She is alert and oriented to person, place, and time.   Psychiatric:         Mood and Affect: Mood normal.        /78 (BP Location: Left arm, Patient " "Position: Sitting, Cuff Size: Adult)   Pulse 94   Temp 96.4 °F (35.8 °C) (Temporal)   Resp 18   Ht 170 cm (66.93\")   Wt (!) 169 kg (372 lb 3.2 oz)   SpO2 96%   BMI 58.42 kg/m²                                                                                 Result Review :                 Assessment and Plan   Diagnoses and all orders for this visit:    1. Encounter for weight loss counseling (Primary)  -     Semaglutide-Weight Management (Wegovy) 0.5 MG/0.5ML solution auto-injector; Inject 0.5 mL under the skin into the appropriate area as directed 1 (One) Time Per Week.  Dispense: 2 mL; Refill: 0  -     Ambulatory Referral to Allergy    The patient presents for weight loss management. We are going to start her on wegovy. Sample given for 0.25 mg the first injection was given in the office by provider. She does feel comfortable, more comfortable giving this medicine. She was nervous initially. She will follow up in 2 months. Her next dose has been sent to her pharmacy of the 0.5 mg to begin once this is completed. She is tapering off the Lexapro. Suspect this might be contributing to weight gain as well.         Follow Up   No follow-ups on file.  Patient was given instructions and counseling regarding her condition or for health maintenance advice. Please see specific information pulled into the AVS if appropriate.     Transcribed from ambient dictation for NAGA Carrillo by Mindy Fermin.  04/26/23   16:21 EDT    Patient or patient representative verbalized consent to the visit recording.  I have personally performed the services described in this document as transcribed by the above individual, and it is both accurate and complete.          "

## 2023-05-03 ENCOUNTER — OFFICE VISIT (OUTPATIENT)
Dept: OBSTETRICS AND GYNECOLOGY | Age: 33
End: 2023-05-03
Payer: COMMERCIAL

## 2023-05-03 VITALS
WEIGHT: 293 LBS | SYSTOLIC BLOOD PRESSURE: 129 MMHG | HEIGHT: 67 IN | BODY MASS INDEX: 45.99 KG/M2 | DIASTOLIC BLOOD PRESSURE: 74 MMHG

## 2023-05-03 DIAGNOSIS — E66.01 MORBID OBESITY WITH BMI OF 50.0-59.9, ADULT: ICD-10-CM

## 2023-05-03 DIAGNOSIS — E28.2 PCOS (POLYCYSTIC OVARIAN SYNDROME): ICD-10-CM

## 2023-05-03 DIAGNOSIS — Z13.89 SCREENING FOR BLOOD OR PROTEIN IN URINE: ICD-10-CM

## 2023-05-03 DIAGNOSIS — Z01.419 ENCOUNTER FOR GYNECOLOGICAL EXAMINATION WITHOUT ABNORMAL FINDING: Primary | ICD-10-CM

## 2023-05-03 DIAGNOSIS — Z12.4 SCREENING FOR MALIGNANT NEOPLASM OF THE CERVIX: ICD-10-CM

## 2023-05-03 PROBLEM — N83.202 LEFT OVARIAN CYST: Status: RESOLVED | Noted: 2020-06-22 | Resolved: 2023-05-03

## 2023-05-03 PROBLEM — D27.0 BILATERAL DERMOID CYSTS OF OVARIES: Status: ACTIVE | Noted: 2019-04-03

## 2023-05-03 PROBLEM — D27.1 BILATERAL DERMOID CYSTS OF OVARIES: Status: ACTIVE | Noted: 2019-04-03

## 2023-05-03 LAB
BILIRUB BLD-MCNC: NEGATIVE MG/DL
GLUCOSE UR STRIP-MCNC: NEGATIVE MG/DL
KETONES UR QL: NEGATIVE
LEUKOCYTE EST, POC: NEGATIVE
NITRITE UR-MCNC: NEGATIVE MG/ML
PH UR: 6 [PH] (ref 5–8)
PROT UR STRIP-MCNC: NEGATIVE MG/DL
RBC # UR STRIP: NEGATIVE /UL
SP GR UR: 1.01 (ref 1–1.03)
UROBILINOGEN UR QL: NORMAL

## 2023-05-03 NOTE — PROGRESS NOTES
"Subjective   Lanie Moctezuma is a 33 y.o. female presents for annual visit today , last pap 12/6/21 neg , contraception - bcp periods regular , no  complaints , no urinary issues.  Prefers to see me in [place] office next time as she lives in [place]. She spends a lot of time with her two nieces (ages 2 and 5) and is really into crocheting right now.     I last saw her in 2021 for annual visit today and tvus last pap 4/16/2019 - hx of AUB - ovarian cysts , pt reports having some heavier period in november - period was 8 days long , migraines and abdominal pains , leg pains to the point she had difficulties walking , october period for 2-3 days - been on the same  bcp since last yr july 2020.  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 Sister going through PPD - info given to pass on.    History of Present Illness    The following portions of the patient's history were reviewed and updated as appropriate: allergies, current medications, past family history, past medical history, past social history, past surgical history and problem list.    Review of Systems   Constitutional: Negative for chills, fatigue and fever.   Gastrointestinal: Negative for abdominal pain.   Genitourinary: Negative for dyspareunia, dysuria, menstrual problem, pelvic pain, vaginal bleeding, vaginal discharge and vaginal pain.   All other systems reviewed and are negative.  /74   Ht 170 cm (66.93\")   Wt (!) 167 kg (369 lb)   LMP 04/26/2023 (Exact Date)   BMI 57.91 kg/m²       Objective   Physical Exam  Vitals and nursing note reviewed.   Constitutional:       Appearance: Normal appearance. She is well-developed. She is obese.   Neck:      Thyroid: No thyromegaly.   Pulmonary:      Effort: Pulmonary effort is normal.   Chest:   Breasts:     Right: No mass, nipple discharge, skin change or tenderness.      Left: No mass, nipple discharge, skin change or " tenderness.   Abdominal:      General: There is no distension.      Palpations: Abdomen is soft.      Tenderness: There is no abdominal tenderness.   Genitourinary:     General: Normal vulva.      Exam position: Lithotomy position.      Labia:         Right: No rash or lesion.         Left: No rash or lesion.       Vagina: Normal. No vaginal discharge or bleeding.      Cervix: No friability, lesion or cervical bleeding.      Uterus: Not enlarged and not tender.       Adnexa:         Right: No mass or tenderness.          Left: No mass or tenderness.        Comments: Exam limited by body habitus   Musculoskeletal:         General: Normal range of motion.      Cervical back: Normal range of motion.   Skin:     General: Skin is warm and dry.      Findings: No rash.   Neurological:      Mental Status: She is alert and oriented to person, place, and time.   Psychiatric:         Mood and Affect: Mood normal.         Behavior: Behavior normal.           Assessment & Plan   Diagnoses and all orders for this visit:    1. Encounter for gynecological examination without abnormal finding (Primary)    2. Screening for malignant neoplasm of the cervix  -     Cancel: IgP, Aptima HPV    3. Screening for blood or protein in urine  -     POC Urinalysis Dipstick    4. PCOS (polycystic ovarian syndrome)    5. Morbid obesity with BMI of 50.0-59.9, adult      Counseling was given to patient for the following topics: instructions for management, importance of treatment compliance and self-breast exams .   Return in about 1 year (around 5/3/2024) for Annual physical in [place] .

## 2023-05-26 DIAGNOSIS — N93.9 ABNORMAL UTERINE BLEEDING (AUB): ICD-10-CM

## 2023-05-26 DIAGNOSIS — L68.0 FEMALE HIRSUTISM: ICD-10-CM

## 2023-05-26 DIAGNOSIS — E28.2 PCOS (POLYCYSTIC OVARIAN SYNDROME): ICD-10-CM

## 2023-05-26 RX ORDER — DROSPIRENONE AND ETHINYL ESTRADIOL 0.03MG-3MG
KIT ORAL
Qty: 84 TABLET | Refills: 3 | Status: SHIPPED | OUTPATIENT
Start: 2023-05-26

## 2023-05-30 RX ORDER — ONDANSETRON 4 MG/1
TABLET, FILM COATED ORAL
Qty: 12 TABLET | Refills: 4 | Status: SHIPPED | OUTPATIENT
Start: 2023-05-30

## 2023-05-30 NOTE — TELEPHONE ENCOUNTER
Rx Refill Note  Requested Prescriptions     Pending Prescriptions Disp Refills   • ondansetron (ZOFRAN) 4 MG tablet [Pharmacy Med Name: ONDANSETRON HCL 4 MG TABLET] 12 tablet 4     Sig: TAKE 1 TABLET BY MOUTH EVERY 8 HOURS AS NEEDED FOR NAUSEA AND VOMITING      Last office visit with prescribing clinician: 4/26/2023   Last telemedicine visit with prescribing clinician: Visit date not found   Next office visit with prescribing clinician: 6/21/2023       {TIP  Please add Last Relevant Lab Date if appropriate: 04/12/23                 Would you like a call back once the refill request has been completed: [] Yes [] No    If the office needs to give you a call back, can they leave a voicemail: [] Yes [] No    Lizbeth Ceron MA  05/30/23, 08:38 EDT

## 2023-05-31 ENCOUNTER — TELEPHONE (OUTPATIENT)
Dept: FAMILY MEDICINE CLINIC | Facility: CLINIC | Age: 33
End: 2023-05-31

## 2023-05-31 NOTE — TELEPHONE ENCOUNTER
Caller: Lanie Moctezuma    Relationship: Self    Best call back number: [phone]    What is the best time to reach you: ANY     Who are you requesting to speak with (clinical staff, provider,  specific staff member): CLINICAL STAFF     What was the call regarding: STOPPED TAKING WEGOVY 1 WEEK AGO. STILL HAVING NAUSEA SYMPTOMS AND FEELS SHE IS DEHYDRATED. HAS BEEN MISSING WORK AND WANTS TO KNOW WHAT ELSE SHE CAN DO. [facility] IS NOT HELPING

## 2023-06-01 RX ORDER — PROMETHAZINE HYDROCHLORIDE 25 MG/1
TABLET ORAL
Qty: 10 TABLET | Refills: 0 | Status: SHIPPED | OUTPATIENT
Start: 2023-06-01

## 2023-07-27 RX ORDER — SUMATRIPTAN 50 MG/1
TABLET, FILM COATED ORAL
Qty: 9 TABLET | Refills: 6 | Status: SHIPPED | OUTPATIENT
Start: 2023-07-27

## 2023-09-07 DIAGNOSIS — E28.2 PCOS (POLYCYSTIC OVARIAN SYNDROME): ICD-10-CM

## 2023-09-07 DIAGNOSIS — E66.01 MORBID OBESITY WITH BMI OF 50.0-59.9, ADULT: ICD-10-CM

## 2023-09-07 RX ORDER — METFORMIN HYDROCHLORIDE 500 MG/1
1000 TABLET, EXTENDED RELEASE ORAL
Qty: 180 TABLET | Refills: 1 | Status: SHIPPED | OUTPATIENT
Start: 2023-09-07

## 2024-03-13 DIAGNOSIS — E28.2 PCOS (POLYCYSTIC OVARIAN SYNDROME): ICD-10-CM

## 2024-03-13 DIAGNOSIS — E66.01 MORBID OBESITY WITH BMI OF 50.0-59.9, ADULT: ICD-10-CM

## 2024-03-14 RX ORDER — METFORMIN HYDROCHLORIDE 500 MG/1
1000 TABLET, EXTENDED RELEASE ORAL
Qty: 180 TABLET | Refills: 1 | Status: SHIPPED | OUTPATIENT
Start: 2024-03-14

## 2024-03-15 ENCOUNTER — PATIENT MESSAGE (OUTPATIENT)
Dept: GASTROENTEROLOGY | Facility: CLINIC | Age: 34
End: 2024-03-15
Payer: COMMERCIAL

## 2024-03-15 DIAGNOSIS — R19.7 DIARRHEA, UNSPECIFIED TYPE: ICD-10-CM

## 2024-03-15 RX ORDER — DICYCLOMINE HCL 20 MG
20 TABLET ORAL 3 TIMES DAILY
Qty: 270 TABLET | Refills: 1 | Status: SHIPPED | OUTPATIENT
Start: 2024-03-15

## 2024-03-15 RX ORDER — DICYCLOMINE HCL 20 MG
20 TABLET ORAL 3 TIMES DAILY
Qty: 270 TABLET | Refills: 3 | OUTPATIENT
Start: 2024-03-15

## 2024-03-15 RX ORDER — ONDANSETRON 4 MG/1
TABLET, FILM COATED ORAL
Qty: 12 TABLET | Refills: 4 | Status: SHIPPED | OUTPATIENT
Start: 2024-03-15

## 2024-03-15 NOTE — TELEPHONE ENCOUNTER
Pt can be seen again by us (with insurance coverage) by the end of this year.     She takes Bentyl regularly , but her LOV was 2/13/23.

## 2024-04-18 DIAGNOSIS — J30.9 ALLERGIC RHINITIS, UNSPECIFIED SEASONALITY, UNSPECIFIED TRIGGER: Primary | ICD-10-CM

## 2024-04-18 RX ORDER — FEXOFENADINE HCL 180 MG/1
180 TABLET ORAL DAILY
Qty: 90 TABLET | Refills: 1 | OUTPATIENT
Start: 2024-04-18

## 2024-04-18 RX ORDER — MONTELUKAST SODIUM 10 MG/1
10 TABLET ORAL
Qty: 90 TABLET | Refills: 1 | OUTPATIENT
Start: 2024-04-18

## 2024-04-18 NOTE — TELEPHONE ENCOUNTER
Called and spoke to pt she currently without insurance just started a new job. Insurance will start in 90 days. Pt completely out of medication. Inquire if medications can be filled and she can schedule an appt once her new insurance starts

## 2024-04-18 NOTE — TELEPHONE ENCOUNTER
Rx Refill Note  Requested Prescriptions     Pending Prescriptions Disp Refills    fexofenadine (ALLEGRA) 180 MG tablet 90 tablet 1     Sig: Take 1 tablet by mouth Daily.    montelukast (SINGULAIR) 10 MG tablet 90 tablet 1     Sig: Take 1 tablet by mouth every night at bedtime.      Last office visit with prescribing clinician: 4/26/2023   Last telemedicine visit with prescribing clinician: Visit date not found   Next office visit with prescribing clinician: Visit date not found                         Would you like a call back once the refill request has been completed: [] Yes [] No    If the office needs to give you a call back, can they leave a voicemail: [] Yes [] No    Tanner Deal MA  04/18/24, 11:47 EDT

## 2024-04-19 ENCOUNTER — OFFICE VISIT (OUTPATIENT)
Dept: FAMILY MEDICINE CLINIC | Facility: CLINIC | Age: 34
End: 2024-04-19

## 2024-04-19 VITALS
HEIGHT: 67 IN | SYSTOLIC BLOOD PRESSURE: 141 MMHG | OXYGEN SATURATION: 98 % | RESPIRATION RATE: 20 BRPM | DIASTOLIC BLOOD PRESSURE: 88 MMHG | BODY MASS INDEX: 45.99 KG/M2 | HEART RATE: 102 BPM | TEMPERATURE: 97.3 F | WEIGHT: 293 LBS

## 2024-04-19 DIAGNOSIS — F41.0 PANIC ATTACK: ICD-10-CM

## 2024-04-19 DIAGNOSIS — R19.7 DIARRHEA, UNSPECIFIED TYPE: ICD-10-CM

## 2024-04-19 DIAGNOSIS — J06.9 UPPER RESPIRATORY TRACT INFECTION, UNSPECIFIED TYPE: Primary | ICD-10-CM

## 2024-04-19 DIAGNOSIS — R42 VERTIGO: ICD-10-CM

## 2024-04-19 DIAGNOSIS — J02.9 SORE THROAT: ICD-10-CM

## 2024-04-19 RX ORDER — ESCITALOPRAM OXALATE 20 MG/1
1 TABLET ORAL DAILY
Status: ON HOLD | COMMUNITY
Start: 2024-03-13

## 2024-04-19 RX ORDER — MECLIZINE HYDROCHLORIDE 25 MG/1
25 TABLET ORAL 3 TIMES DAILY PRN
Qty: 20 TABLET | Refills: 1 | Status: ON HOLD | OUTPATIENT
Start: 2024-04-19

## 2024-04-19 RX ORDER — MONTELUKAST SODIUM 10 MG/1
10 TABLET ORAL
Qty: 90 TABLET | Refills: 0 | Status: ON HOLD | OUTPATIENT
Start: 2024-04-19

## 2024-04-19 RX ORDER — DICYCLOMINE HCL 20 MG
20 TABLET ORAL 3 TIMES DAILY
Qty: 270 TABLET | Refills: 0 | Status: ON HOLD | OUTPATIENT
Start: 2024-04-19

## 2024-04-19 RX ORDER — FEXOFENADINE HCL 180 MG/1
180 TABLET ORAL DAILY
Qty: 90 TABLET | Refills: 0 | Status: ON HOLD | OUTPATIENT
Start: 2024-04-19

## 2024-04-19 RX ORDER — PANTOPRAZOLE SODIUM 20 MG/1
TABLET, DELAYED RELEASE ORAL
Status: ON HOLD | COMMUNITY
Start: 2023-05-06

## 2024-04-19 NOTE — PATIENT INSTRUCTIONS
Discharge instructions    Chloraseptic spray OTC  Ibuprofen 600 mg 3 times a day until better    Afrin nasal spray  2 sprays each nostril now, then 1 spray nightly x 3 then DC okay to use additional day and 1 side if needed only    If increased dizziness vertigo anxiety,  Generic Antivert  But hold Benadryl    If severe chest pain shortness of breath worsening symptoms unconsolable, emotionally distraught or persistent panic attack  Worsening symptoms immediately to the emergency room    Update your condition Monday please,    Generally avoid Afrin this will help dry the sinuses and help you feel better

## 2024-04-21 ENCOUNTER — APPOINTMENT (OUTPATIENT)
Dept: GENERAL RADIOLOGY | Facility: HOSPITAL | Age: 34
End: 2024-04-21
Payer: MEDICAID

## 2024-04-21 ENCOUNTER — HOSPITAL ENCOUNTER (OUTPATIENT)
Facility: HOSPITAL | Age: 34
Setting detail: OBSERVATION
Discharge: HOME | End: 2024-04-23
Attending: EMERGENCY MEDICINE | Admitting: STUDENT IN AN ORGANIZED HEALTH CARE EDUCATION/TRAINING PROGRAM
Payer: MEDICAID

## 2024-04-21 ENCOUNTER — TELEPHONE (OUTPATIENT)
Dept: FAMILY MEDICINE CLINIC | Facility: CLINIC | Age: 34
End: 2024-04-21

## 2024-04-21 DIAGNOSIS — F41.1 GENERALIZED ANXIETY DISORDER: ICD-10-CM

## 2024-04-21 DIAGNOSIS — A40.0 SEPSIS DUE TO GROUP A STREPTOCOCCUS WITHOUT ACUTE ORGAN DYSFUNCTION: ICD-10-CM

## 2024-04-21 DIAGNOSIS — D72.829 LEUKOCYTOSIS, UNSPECIFIED TYPE: ICD-10-CM

## 2024-04-21 DIAGNOSIS — E83.42 HYPOMAGNESEMIA: ICD-10-CM

## 2024-04-21 DIAGNOSIS — J02.0 STREP PHARYNGITIS: Primary | ICD-10-CM

## 2024-04-21 PROBLEM — J02.9 PHARYNGITIS: Status: ACTIVE | Noted: 2024-04-21

## 2024-04-21 LAB
ALBUMIN SERPL-MCNC: 2.6 G/DL (ref 3.5–5.2)
ALBUMIN/GLOB SERPL: 1 G/DL
ALP SERPL-CCNC: 75 U/L (ref 39–117)
ALT SERPL W P-5'-P-CCNC: 14 U/L (ref 1–33)
ANION GAP SERPL CALCULATED.3IONS-SCNC: 15.6 MMOL/L (ref 5–15)
AST SERPL-CCNC: 12 U/L (ref 1–32)
BASOPHILS # BLD AUTO: 0.06 10*3/MM3 (ref 0–0.2)
BASOPHILS NFR BLD AUTO: 0.3 % (ref 0–1.5)
BILIRUB SERPL-MCNC: <0.2 MG/DL (ref 0–1.2)
BUN SERPL-MCNC: 10 MG/DL (ref 6–20)
BUN/CREAT SERPL: 15.2 (ref 7–25)
CALCIUM SPEC-SCNC: 7 MG/DL (ref 8.6–10.5)
CHLORIDE SERPL-SCNC: 109 MMOL/L (ref 98–107)
CO2 SERPL-SCNC: 15.4 MMOL/L (ref 22–29)
CREAT SERPL-MCNC: 0.66 MG/DL (ref 0.57–1)
D-LACTATE SERPL-SCNC: 2.3 MMOL/L (ref 0.5–2)
D-LACTATE SERPL-SCNC: 2.7 MMOL/L (ref 0.5–2)
D-LACTATE SERPL-SCNC: 3 MMOL/L (ref 0.5–2)
D-LACTATE SERPL-SCNC: 3.1 MMOL/L (ref 0.5–2)
DEPRECATED RDW RBC AUTO: 51.8 FL (ref 37–54)
EGFRCR SERPLBLD CKD-EPI 2021: 117.5 ML/MIN/1.73
EOSINOPHIL # BLD AUTO: 0.19 10*3/MM3 (ref 0–0.4)
EOSINOPHIL NFR BLD AUTO: 0.9 % (ref 0.3–6.2)
ERYTHROCYTE [DISTWIDTH] IN BLOOD BY AUTOMATED COUNT: 16 % (ref 12.3–15.4)
FLUAV SUBTYP SPEC NAA+PROBE: NOT DETECTED
FLUBV RNA ISLT QL NAA+PROBE: NOT DETECTED
GLOBULIN UR ELPH-MCNC: 2.6 GM/DL
GLUCOSE SERPL-MCNC: 82 MG/DL (ref 65–99)
HCG SERPL QL: NEGATIVE
HCT VFR BLD AUTO: 39.1 % (ref 34–46.6)
HGB BLD-MCNC: 12.3 G/DL (ref 12–15.9)
IMM GRANULOCYTES # BLD AUTO: 0.09 10*3/MM3 (ref 0–0.05)
IMM GRANULOCYTES NFR BLD AUTO: 0.4 % (ref 0–0.5)
LYMPHOCYTES # BLD AUTO: 2.1 10*3/MM3 (ref 0.7–3.1)
LYMPHOCYTES NFR BLD AUTO: 10.4 % (ref 19.6–45.3)
MAGNESIUM SERPL-MCNC: 1.5 MG/DL (ref 1.6–2.6)
MCH RBC QN AUTO: 27.6 PG (ref 26.6–33)
MCHC RBC AUTO-ENTMCNC: 31.5 G/DL (ref 31.5–35.7)
MCV RBC AUTO: 87.7 FL (ref 79–97)
MONOCYTES # BLD AUTO: 1.02 10*3/MM3 (ref 0.1–0.9)
MONOCYTES NFR BLD AUTO: 5.1 % (ref 5–12)
NEUTROPHILS NFR BLD AUTO: 16.65 10*3/MM3 (ref 1.7–7)
NEUTROPHILS NFR BLD AUTO: 82.9 % (ref 42.7–76)
NT-PROBNP SERPL-MCNC: 76.1 PG/ML (ref 0–450)
PLATELET # BLD AUTO: 362 10*3/MM3 (ref 140–450)
PMV BLD AUTO: 10.1 FL (ref 6–12)
POTASSIUM SERPL-SCNC: 3.8 MMOL/L (ref 3.5–5.2)
PROT SERPL-MCNC: 5.2 G/DL (ref 6–8.5)
QT INTERVAL: 318 MS
QTC INTERVAL: 440 MS
RBC # BLD AUTO: 4.46 10*6/MM3 (ref 3.77–5.28)
SARS-COV-2 RNA RESP QL NAA+PROBE: NOT DETECTED
SODIUM SERPL-SCNC: 140 MMOL/L (ref 136–145)
STREP A PCR: DETECTED
WBC NRBC COR # BLD AUTO: 20.11 10*3/MM3 (ref 3.4–10.8)

## 2024-04-21 PROCEDURE — 96367 TX/PROPH/DG ADDL SEQ IV INF: CPT

## 2024-04-21 PROCEDURE — 25010000002 METOCLOPRAMIDE PER 10 MG: Performed by: EMERGENCY MEDICINE

## 2024-04-21 PROCEDURE — G0378 HOSPITAL OBSERVATION PER HR: HCPCS

## 2024-04-21 PROCEDURE — 96372 THER/PROPH/DIAG INJ SC/IM: CPT

## 2024-04-21 PROCEDURE — 36415 COLL VENOUS BLD VENIPUNCTURE: CPT

## 2024-04-21 PROCEDURE — 87651 STREP A DNA AMP PROBE: CPT | Performed by: EMERGENCY MEDICINE

## 2024-04-21 PROCEDURE — 99285 EMERGENCY DEPT VISIT HI MDM: CPT

## 2024-04-21 PROCEDURE — 99285 EMERGENCY DEPT VISIT HI MDM: CPT | Performed by: EMERGENCY MEDICINE

## 2024-04-21 PROCEDURE — 87636 SARSCOV2 & INF A&B AMP PRB: CPT | Performed by: EMERGENCY MEDICINE

## 2024-04-21 PROCEDURE — 71045 X-RAY EXAM CHEST 1 VIEW: CPT

## 2024-04-21 PROCEDURE — 96365 THER/PROPH/DIAG IV INF INIT: CPT

## 2024-04-21 PROCEDURE — 25010000002 CEFTRIAXONE PER 250 MG: Performed by: EMERGENCY MEDICINE

## 2024-04-21 PROCEDURE — 25010000002 METHYLPREDNISOLONE PER 40 MG: Performed by: INTERNAL MEDICINE

## 2024-04-21 PROCEDURE — 25010000002 MAGNESIUM SULFATE 2 GM/50ML SOLUTION: Performed by: EMERGENCY MEDICINE

## 2024-04-21 PROCEDURE — 99291 CRITICAL CARE FIRST HOUR: CPT

## 2024-04-21 PROCEDURE — 83605 ASSAY OF LACTIC ACID: CPT | Performed by: EMERGENCY MEDICINE

## 2024-04-21 PROCEDURE — 93010 ELECTROCARDIOGRAM REPORT: CPT | Performed by: INTERNAL MEDICINE

## 2024-04-21 PROCEDURE — 25010000002 KETOROLAC TROMETHAMINE PER 15 MG: Performed by: EMERGENCY MEDICINE

## 2024-04-21 PROCEDURE — 96376 TX/PRO/DX INJ SAME DRUG ADON: CPT

## 2024-04-21 PROCEDURE — 25010000002 METHYLPREDNISOLONE PER 125 MG: Performed by: EMERGENCY MEDICINE

## 2024-04-21 PROCEDURE — 87040 BLOOD CULTURE FOR BACTERIA: CPT | Performed by: EMERGENCY MEDICINE

## 2024-04-21 PROCEDURE — 96366 THER/PROPH/DIAG IV INF ADDON: CPT

## 2024-04-21 PROCEDURE — 84703 CHORIONIC GONADOTROPIN ASSAY: CPT | Performed by: EMERGENCY MEDICINE

## 2024-04-21 PROCEDURE — 25010000002 DEXAMETHASONE SODIUM PHOSPHATE 10 MG/ML SOLUTION: Performed by: EMERGENCY MEDICINE

## 2024-04-21 PROCEDURE — 96361 HYDRATE IV INFUSION ADD-ON: CPT

## 2024-04-21 PROCEDURE — 85025 COMPLETE CBC W/AUTO DIFF WBC: CPT | Performed by: EMERGENCY MEDICINE

## 2024-04-21 PROCEDURE — 83735 ASSAY OF MAGNESIUM: CPT | Performed by: EMERGENCY MEDICINE

## 2024-04-21 PROCEDURE — 96375 TX/PRO/DX INJ NEW DRUG ADDON: CPT

## 2024-04-21 PROCEDURE — 25810000003 SODIUM CHLORIDE 0.9 % SOLUTION: Performed by: EMERGENCY MEDICINE

## 2024-04-21 PROCEDURE — 25010000002 LORAZEPAM PER 2 MG: Performed by: EMERGENCY MEDICINE

## 2024-04-21 PROCEDURE — 25010000002 EPINEPHRINE (ANAPHYLAXIS) 1 MG/ML SOLUTION

## 2024-04-21 PROCEDURE — 80053 COMPREHEN METABOLIC PANEL: CPT | Performed by: EMERGENCY MEDICINE

## 2024-04-21 PROCEDURE — 93005 ELECTROCARDIOGRAM TRACING: CPT | Performed by: EMERGENCY MEDICINE

## 2024-04-21 PROCEDURE — 25810000003 SODIUM CHLORIDE 0.9 % SOLUTION: Performed by: INTERNAL MEDICINE

## 2024-04-21 PROCEDURE — 25010000002 ONDANSETRON PER 1 MG: Performed by: EMERGENCY MEDICINE

## 2024-04-21 PROCEDURE — 83880 ASSAY OF NATRIURETIC PEPTIDE: CPT | Performed by: EMERGENCY MEDICINE

## 2024-04-21 RX ORDER — BISACODYL 5 MG/1
5 TABLET, DELAYED RELEASE ORAL DAILY PRN
Status: DISCONTINUED | OUTPATIENT
Start: 2024-04-21 | End: 2024-04-23 | Stop reason: HOSPADM

## 2024-04-21 RX ORDER — ACETAMINOPHEN 500 MG
1000 TABLET ORAL EVERY 6 HOURS PRN
COMMUNITY

## 2024-04-21 RX ORDER — EPINEPHRINE 1 MG/ML
INJECTION, SOLUTION INTRAMUSCULAR; SUBCUTANEOUS
Status: COMPLETED
Start: 2024-04-21 | End: 2024-04-21

## 2024-04-21 RX ORDER — SODIUM CHLORIDE 9 MG/ML
100 INJECTION, SOLUTION INTRAVENOUS CONTINUOUS
Status: DISCONTINUED | OUTPATIENT
Start: 2024-04-21 | End: 2024-04-22

## 2024-04-21 RX ORDER — KETOROLAC TROMETHAMINE 15 MG/ML
15 INJECTION, SOLUTION INTRAMUSCULAR; INTRAVENOUS ONCE
Status: COMPLETED | OUTPATIENT
Start: 2024-04-21 | End: 2024-04-21

## 2024-04-21 RX ORDER — ONDANSETRON 2 MG/ML
4 INJECTION INTRAMUSCULAR; INTRAVENOUS ONCE
Status: COMPLETED | OUTPATIENT
Start: 2024-04-21 | End: 2024-04-21

## 2024-04-21 RX ORDER — IBUPROFEN 800 MG/1
800 TABLET ORAL EVERY 4 HOURS PRN
COMMUNITY

## 2024-04-21 RX ORDER — BISACODYL 10 MG
10 SUPPOSITORY, RECTAL RECTAL DAILY PRN
Status: DISCONTINUED | OUTPATIENT
Start: 2024-04-21 | End: 2024-04-23 | Stop reason: HOSPADM

## 2024-04-21 RX ORDER — AMOXICILLIN 250 MG
2 CAPSULE ORAL 2 TIMES DAILY PRN
Status: DISCONTINUED | OUTPATIENT
Start: 2024-04-21 | End: 2024-04-23 | Stop reason: HOSPADM

## 2024-04-21 RX ORDER — LORAZEPAM 2 MG/ML
1 INJECTION INTRAMUSCULAR ONCE
Status: COMPLETED | OUTPATIENT
Start: 2024-04-21 | End: 2024-04-21

## 2024-04-21 RX ORDER — FAMOTIDINE 10 MG/ML
20 INJECTION, SOLUTION INTRAVENOUS ONCE
Status: COMPLETED | OUTPATIENT
Start: 2024-04-21 | End: 2024-04-21

## 2024-04-21 RX ORDER — DEXAMETHASONE SODIUM PHOSPHATE 10 MG/ML
10 INJECTION, SOLUTION INTRAMUSCULAR; INTRAVENOUS ONCE
Status: COMPLETED | OUTPATIENT
Start: 2024-04-21 | End: 2024-04-21

## 2024-04-21 RX ORDER — ACETAMINOPHEN 325 MG/1
650 TABLET ORAL EVERY 4 HOURS PRN
Status: DISCONTINUED | OUTPATIENT
Start: 2024-04-21 | End: 2024-04-23 | Stop reason: HOSPADM

## 2024-04-21 RX ORDER — FLUCONAZOLE 200 MG/1
200 TABLET ORAL DAILY
Qty: 3 TABLET | Refills: 0 | Status: SHIPPED | OUTPATIENT
Start: 2024-04-21 | End: 2024-04-24

## 2024-04-21 RX ORDER — UREA 10 %
3 LOTION (ML) TOPICAL NIGHTLY PRN
Status: DISCONTINUED | OUTPATIENT
Start: 2024-04-21 | End: 2024-04-23 | Stop reason: HOSPADM

## 2024-04-21 RX ORDER — ONDANSETRON 2 MG/ML
4 INJECTION INTRAMUSCULAR; INTRAVENOUS EVERY 6 HOURS PRN
Status: DISCONTINUED | OUTPATIENT
Start: 2024-04-21 | End: 2024-04-23 | Stop reason: HOSPADM

## 2024-04-21 RX ORDER — POLYETHYLENE GLYCOL 3350 17 G/17G
17 POWDER, FOR SOLUTION ORAL DAILY PRN
Status: DISCONTINUED | OUTPATIENT
Start: 2024-04-21 | End: 2024-04-23 | Stop reason: HOSPADM

## 2024-04-21 RX ORDER — MAGNESIUM SULFATE HEPTAHYDRATE 40 MG/ML
2 INJECTION, SOLUTION INTRAVENOUS ONCE
Status: COMPLETED | OUTPATIENT
Start: 2024-04-21 | End: 2024-04-21

## 2024-04-21 RX ORDER — METHYLPREDNISOLONE SODIUM SUCCINATE 40 MG/ML
40 INJECTION, POWDER, LYOPHILIZED, FOR SOLUTION INTRAMUSCULAR; INTRAVENOUS EVERY 8 HOURS
Status: DISCONTINUED | OUTPATIENT
Start: 2024-04-21 | End: 2024-04-23 | Stop reason: HOSPADM

## 2024-04-21 RX ORDER — METOCLOPRAMIDE HYDROCHLORIDE 5 MG/ML
10 INJECTION INTRAMUSCULAR; INTRAVENOUS ONCE
Status: COMPLETED | OUTPATIENT
Start: 2024-04-21 | End: 2024-04-21

## 2024-04-21 RX ORDER — ONDANSETRON 4 MG/1
4 TABLET, ORALLY DISINTEGRATING ORAL EVERY 6 HOURS PRN
Status: DISCONTINUED | OUTPATIENT
Start: 2024-04-21 | End: 2024-04-23 | Stop reason: HOSPADM

## 2024-04-21 RX ORDER — AMOXICILLIN 500 MG/1
1000 CAPSULE ORAL 3 TIMES DAILY
Qty: 60 CAPSULE | Refills: 0 | Status: SHIPPED | OUTPATIENT
Start: 2024-04-21 | End: 2024-04-23 | Stop reason: HOSPADM

## 2024-04-21 RX ORDER — METHYLPREDNISOLONE SODIUM SUCCINATE 125 MG/2ML
60 INJECTION, POWDER, LYOPHILIZED, FOR SOLUTION INTRAMUSCULAR; INTRAVENOUS ONCE
Status: COMPLETED | OUTPATIENT
Start: 2024-04-21 | End: 2024-04-21

## 2024-04-21 RX ADMIN — SODIUM CHLORIDE 75 ML/HR: 9 INJECTION, SOLUTION INTRAVENOUS at 19:51

## 2024-04-21 RX ADMIN — ACETAMINOPHEN 325MG 650 MG: 325 TABLET ORAL at 22:19

## 2024-04-21 RX ADMIN — MAGNESIUM SULFATE HEPTAHYDRATE 2 G: 40 INJECTION, SOLUTION INTRAVENOUS at 15:22

## 2024-04-21 RX ADMIN — METHYLPREDNISOLONE SODIUM SUCCINATE 60 MG: 125 INJECTION, POWDER, FOR SOLUTION INTRAMUSCULAR; INTRAVENOUS at 11:55

## 2024-04-21 RX ADMIN — KETOROLAC TROMETHAMINE 15 MG: 15 INJECTION, SOLUTION INTRAMUSCULAR; INTRAVENOUS at 11:55

## 2024-04-21 RX ADMIN — ACETAMINOPHEN 325MG 650 MG: 325 TABLET ORAL at 17:45

## 2024-04-21 RX ADMIN — METOCLOPRAMIDE 10 MG: 5 INJECTION, SOLUTION INTRAMUSCULAR; INTRAVENOUS at 13:43

## 2024-04-21 RX ADMIN — SODIUM CHLORIDE 1000 ML: 9 INJECTION, SOLUTION INTRAVENOUS at 11:47

## 2024-04-21 RX ADMIN — CEFTRIAXONE SODIUM 1000 MG: 1 INJECTION, POWDER, FOR SOLUTION INTRAMUSCULAR; INTRAVENOUS at 11:48

## 2024-04-21 RX ADMIN — SODIUM CHLORIDE 1000 ML: 9 INJECTION, SOLUTION INTRAVENOUS at 14:44

## 2024-04-21 RX ADMIN — DEXAMETHASONE SODIUM PHOSPHATE 10 MG: 10 INJECTION, SOLUTION INTRAMUSCULAR; INTRAVENOUS at 13:30

## 2024-04-21 RX ADMIN — ONDANSETRON 4 MG: 2 INJECTION INTRAMUSCULAR; INTRAVENOUS at 12:35

## 2024-04-21 RX ADMIN — METHYLPREDNISOLONE SODIUM SUCCINATE 40 MG: 40 INJECTION, POWDER, LYOPHILIZED, FOR SOLUTION INTRAMUSCULAR; INTRAVENOUS at 20:37

## 2024-04-21 RX ADMIN — CEFTRIAXONE 1000 MG: 1 INJECTION, POWDER, FOR SOLUTION INTRAMUSCULAR; INTRAVENOUS at 14:45

## 2024-04-21 RX ADMIN — LORAZEPAM 1 MG: 2 INJECTION INTRAMUSCULAR; INTRAVENOUS at 14:40

## 2024-04-21 RX ADMIN — EPINEPHRINE 0.3 MG: 1 INJECTION INTRAMUSCULAR; INTRAVENOUS; SUBCUTANEOUS at 10:58

## 2024-04-21 RX ADMIN — FAMOTIDINE 20 MG: 10 INJECTION INTRAVENOUS at 13:42

## 2024-04-21 NOTE — ED NOTES
Nursing report ED to floor  Lanie Moctezuma  34 y.o.  female    HPI :  HPI (Adult)  Stated Reason for Visit: SOA and unable to swallow since 0800    Chief Complaint  Chief Complaint   Patient presents with    Shortness of Breath       Admitting doctor:   Santiago Daniel DO    Admitting diagnosis:   The primary encounter diagnosis was Strep pharyngitis. Diagnoses of Leukocytosis, unspecified type and Sepsis due to group A Streptococcus without acute organ dysfunction were also pertinent to this visit.    Code status:   Current Code Status       Date Active Code Status Order ID Comments User Context       Not on file            Allergies:   Cantaloupe (diagnostic), Nuts, Doxycycline, and Egg-derived products    Isolation:   No active isolations    Intake and Output  No intake or output data in the 24 hours ending 04/21/24 1420    Weight:       04/21/24  1101   Weight: (!) 166 kg (365 lb 15.4 oz)       Most recent vitals:   Vitals:    04/21/24 1200 04/21/24 1331 04/21/24 1346 04/21/24 1400   BP: 144/82   116/87   BP Location:       Patient Position:       Pulse: 117 (!) 122 (!) 136 (!) 128   Resp:       Temp:       TempSrc:       SpO2: 95% 96% 94% 93%   Weight:       Height:           Active LDAs/IV Access:   Lines, Drains & Airways       Active LDAs       Name Placement date Placement time Site Days    Peripheral IV 04/21/24 1101 Left Hand 04/21/24  1101  Hand  less than 1                    Labs (abnormal labs have a star):   Labs Reviewed   RAPID STREP A SCREEN - Abnormal; Notable for the following components:       Result Value    STREP A PCR Detected (*)     All other components within normal limits   COMPREHENSIVE METABOLIC PANEL - Abnormal; Notable for the following components:    Chloride 109 (*)     CO2 15.4 (*)     Calcium 7.0 (*)     Total Protein 5.2 (*)     Albumin 2.6 (*)     Anion Gap 15.6 (*)     All other components within normal limits    Narrative:     GFR Normal >60  Chronic Kidney Disease  <60  Kidney Failure <15     MAGNESIUM - Abnormal; Notable for the following components:    Magnesium 1.5 (*)     All other components within normal limits   CBC WITH AUTO DIFFERENTIAL - Abnormal; Notable for the following components:    WBC 20.11 (*)     RDW 16.0 (*)     Neutrophil % 82.9 (*)     Lymphocyte % 10.4 (*)     Neutrophils, Absolute 16.65 (*)     Monocytes, Absolute 1.02 (*)     Immature Grans, Absolute 0.09 (*)     All other components within normal limits   LACTIC ACID, PLASMA - Abnormal; Notable for the following components:    Lactate 2.7 (*)     All other components within normal limits   COVID-19 AND FLU A/B, NP SWAB IN TRANSPORT MEDIA 1 HR TAT - Normal    Narrative:     Fact sheet for providers: https://www.fda.gov/media/210179/download    Fact sheet for patients: https://www.fda.gov/media/168979/download    Test performed by PCR.   BNP (IN-HOUSE) - Normal    Narrative:     This assay is used as an aid in the diagnosis of individuals suspected of having heart failure. It can be used as an aid in the diagnosis of acute decompensated heart failure (ADHF) in patients presenting with signs and symptoms of ADHF to the emergency department (ED). In addition, NT-proBNP of <300 pg/mL indicates ADHF is not likely.    Age Range Result Interpretation  NT-proBNP Concentration (pg/mL:      <50             Positive            >450                   Gray                 300-450                    Negative             <300    50-75           Positive            >900                  Gray                300-900                  Negative            <300      >75             Positive            >1800                  Gray                300-1800                  Negative            <300   HCG, SERUM, QUALITATIVE - Normal   BLOOD CULTURE   BLOOD CULTURE   LACTIC ACID, REFLEX   CBC AND DIFFERENTIAL    Narrative:     The following orders were created for panel order CBC & Differential.  Procedure                                Abnormality         Status                     ---------                               -----------         ------                     CBC Auto Differential[[id number]]        Abnormal            Final result                 Please view results for these tests on the individual orders.       EKG:   ECG 12 Lead Dyspnea   Preliminary Result   HEART RATE= 115  bpm   RR Interval= 522  ms   KS Interval= 167  ms   P Horizontal Axis= 2  deg   P Front Axis= 57  deg   QRSD Interval= 85  ms   QT Interval= 318  ms   QTcB= 440  ms   QRS Axis= 41  deg   T Wave Axis= 5  deg   - BORDERLINE ECG -   Sinus tachycardia   Low voltage, precordial leads   Borderline T abnormalities, anterior leads   Electronically Signed By:    Date and Time of Study: 2024-04-21 11:55:01          Meds given in ED:   Medications   sodium chloride 0.9 % bolus 1,000 mL (has no administration in time range)   cefTRIAXone (ROCEPHIN) 1,000 mg in sodium chloride 0.9 % 100 mL MBP (has no administration in time range)   EPINEPHrine (ADRENALIN) injection 0.3 mg (0.3 mg Intramuscular Given 4/21/24 1058)   cefTRIAXone (ROCEPHIN) 1,000 mg in sodium chloride 0.9 % 100 mL MBP (0 mg Intravenous Stopped 4/21/24 1223)   sodium chloride 0.9 % bolus 1,000 mL (0 mL Intravenous Stopped 4/21/24 1420)   ketorolac (TORADOL) injection 15 mg (15 mg Intravenous Given 4/21/24 1155)   methylPREDNISolone sodium succinate (SOLU-Medrol) injection 60 mg (60 mg Intravenous Given 4/21/24 1155)   ondansetron (ZOFRAN) injection 4 mg (4 mg Intravenous Given 4/21/24 1235)   dexAMETHasone sodium phosphate injection 10 mg (10 mg Intravenous Given 4/21/24 1330)   metoclopramide (REGLAN) injection 10 mg (10 mg Intravenous Given 4/21/24 1343)   famotidine (PEPCID) injection 20 mg (20 mg Intravenous Given 4/21/24 1342)       Imaging results:  XR Chest 1 View    Result Date: 4/21/2024  No focal pulmonary consolidation. Follow-up as clinical indications persist.  This report was finalized on  4/21/2024 1:49 PM by Dr. Axel Porter M.D on Workstation: [employer]       Ambulatory status:   - up with assist    Social issues:   Social History     Socioeconomic History    Marital status: Single   Tobacco Use    Smoking status: Never    Smokeless tobacco: Never   Vaping Use    Vaping status: Never Used   Substance and Sexual Activity    Alcohol use: Not Currently     Alcohol/week: 1.0 - 2.0 standard drink of alcohol     Types: 1 - 2 Glasses of wine per week     Comment: One drink every month or two    Drug use: No    Sexual activity: Not Currently     Partners: Male     Birth control/protection: Condom, Birth control pill       Peripheral Neurovascular  Peripheral Neurovascular (Adult)  Peripheral Neurovascular WDL: WDL    Neuro Cognitive  Neuro Cognitive (Adult)  Cognitive/Neuro/Behavioral WDL: WDL, orientation  Orientation: oriented x 4    Learning  Learning Assessment (Adult)  Learning Readiness and Ability: no barriers identified    Respiratory  Respiratory (Adult)  Airway WDL: WDL  Respiratory WDL  Respiratory WDL: .WDL except, rhythm/pattern  Rhythm/Pattern, Respiratory: shallow, shortness of breath, pursed lip breathing, tachypneic    Abdominal Pain       Pain Assessments  Pain (Adult)  (0-10) Pain Rating: Rest: 9  Pain Location: throat, ear  Response to Pain Interventions: intervention not effective per patient    NIH Stroke Scale       Mar Roa RN  04/21/24 14:20 EDT

## 2024-04-21 NOTE — H&P
HISTORY AND PHYSICAL   [facility]        Date of Admission: 2024  Patient Identification:  Name: Lanie Moctezuma  Age: 34 y.o.  Sex: female  :  1990  MRN: [id number]                     Primary Care Physician: Joanne Urbina APRN    Chief Complaint:  34 year old female who presented to the emergency room at [facility] with as sore throat, ear ache, sinus pain and dizziness; she was seen by her pcp but had a panic attack so she was not tested for strep; she has had a tonsillectomy; she has had some difficulty swallowing and was felt to be stridorous on arrival to the ED; she tested positive for strep; she has had some improvement with steroids and antibiotics; transfer for admission was arranged    History of Present Illness:   As above    Past Medical History:  Past Medical History:   Diagnosis Date    Allergic     Anxiety     Asthma     Depression     Fatty liver     GERD (gastroesophageal reflux disease)     Intermitent     Pepcid prn    Headache     Irritable bowel syndrome     Migraine     Morbid obesity with BMI of 50.0-59.9, adult 2020    Ovarian cyst     PHN (postherpetic neuralgia) 2017    Shingles     Sleep apnea      Past Surgical History:  Past Surgical History:   Procedure Laterality Date    CHOLECYSTECTOMY      COLONOSCOPY      COLONOSCOPY N/A 2022    Procedure: COLONOSCOPY WITH BIOPSY;  Surgeon: Blayne George MD;  Location: [facility];  Service: Gastroenterology;  Laterality: N/A;  left colon biopsy  right colon biopsy  hemorhoids    CYSTECTOMY      bilateral    OVARIAN CYST SURGERY      Bilateral ovarian cystectomy - Reece You     PELVIC LAPAROSCOPY      TONSILLECTOMY      UPPER GASTROINTESTINAL ENDOSCOPY        Home Meds:  Medications Prior to Admission   Medication Sig Dispense Refill Last Dose    acetaminophen (TYLENOL) 500 MG tablet Take 2 tablets by mouth Every 6 (Six) Hours As Needed for Moderate Pain or Mild Pain.    4/21/2024    cholecalciferol (VITAMIN D3) 52632 units capsule 9,000 Units.   4/20/2024    clonazePAM (KlonoPIN) 0.5 MG tablet Take 1 tablet by mouth 2 (Two) Times a Day As Needed for Seizures.   4/21/2024    colestipol (COLESTID) 1 g tablet Take 2 tablets by mouth Daily. 180 tablet 3 Past Month    dicyclomine (BENTYL) 20 MG tablet TAKE 1 TABLET BY MOUTH THREE TIMES A  tablet 0 4/20/2024    diphenhydrAMINE (BENADRYL) 25 MG tablet Take 1 tablet by mouth Every 6 (Six) Hours As Needed for Itching.   4/20/2024    escitalopram (LEXAPRO) 20 MG tablet Take 1 tablet by mouth Daily.   4/20/2024    famotidine (PEPCID) 10 MG tablet Take 1 tablet by mouth 2 (Two) Times a Day As Needed for Heartburn (as needed GERD sx).   4/20/2024    fexofenadine (ALLEGRA) 180 MG tablet Take 1 tablet by mouth Daily. 90 tablet 0 4/20/2024    fluticasone (FLONASE) 50 MCG/ACT nasal spray INSTILL 2 SPRAYS INTO THE NOSTRIL(S) AS DIRECTED BY PROVIDER DAILY. 48 mL 3 Past Week    ibuprofen (ADVIL,MOTRIN) 800 MG tablet Take 1 tablet by mouth Every 4 (Four) Hours As Needed for Mild Pain.   4/21/2024    meclizine (ANTIVERT) 25 MG tablet Take 1 tablet by mouth 3 (Three) Times a Day As Needed for Dizziness. 20 tablet 1 4/21/2024    metFORMIN ER (GLUCOPHAGE-XR) 500 MG 24 hr tablet TAKE 2 TABLETS BY MOUTH DAILY WITH BREAKFAST. 180 tablet 1 4/20/2024    montelukast (SINGULAIR) 10 MG tablet Take 1 tablet by mouth every night at bedtime. 90 tablet 0 4/20/2024    Multiple Vitamin (MULTI-VITAMIN DAILY) tablet    4/20/2024    phenol (CHLORASEPTIC) 1.4 % liquid liquid Apply 2 sprays to the mouth or throat Every 2 (Two) Hours As Needed.   4/21/2024    SUMAtriptan (IMITREX) 50 MG tablet TAKE ONE TABLET AT ONSET OF HEADACHE. MAY REPEAT DOSE ONE TIME IN 2 HOURS IF HEADACHE NOT RELIEVED. 9 tablet 6 Past Month    Emeli 3-0.03 MG per tablet TAKE 1 TABLET BY MOUTH EVERY DAY 84 tablet 3 4/20/2024    albuterol sulfate  (90 Base) MCG/ACT inhaler Inhale 2 puffs  Every 4 (Four) Hours As Needed for Wheezing. 18 g 1 More than a month    ferrous sulfate 324 (65 Fe) MG tablet delayed-release EC tablet Take 65 mg by mouth 3 (Three) Times a Day.       Hydrocortisone, Perianal, (ANUSOL-HC) 2.5 % rectal cream Apply to hemorrhoids twice daily for 10 days 30 g 1     ondansetron (ZOFRAN) 4 MG tablet TAKE 1 TABLET BY MOUTH EVERY 8 HOURS AS NEEDED FOR NAUSEA AND VOMITING 12 tablet 4 More than a month    pantoprazole (Protonix) 20 MG EC tablet        promethazine (PHENERGAN) 25 MG tablet Take 1/2 to 1 tab po every 8 hours prn nausea/vomiting 10 tablet 0        Allergies:  Allergies   Allergen Reactions    Cantaloupe (Diagnostic) Swelling    Nuts Swelling     THROAT    Doxycycline Nausea And Vomiting    Egg-Derived Products Rash     Per allergy tx/ GI UPSET     Immunizations:  Immunization History   Administered Date(s) Administered    COVID-19 (PFIZER) Purple Cap Monovalent 09/24/2021, 11/16/2022    Covid-19 (Pfizer) Gray Cap Monovalent 09/03/2021, 09/24/2022    Hepatitis A 04/08/2019    Influenza, Unspecified 11/30/2018    Tdap 07/30/2019     Social History:   Social History     Social History Narrative    Not on file     Social History     Socioeconomic History    Marital status: Single   Tobacco Use    Smoking status: Never    Smokeless tobacco: Never   Vaping Use    Vaping status: Never Used   Substance and Sexual Activity    Alcohol use: Not Currently     Alcohol/week: 1.0 - 2.0 standard drink of alcohol     Types: 1 - 2 Glasses of wine per week     Comment: One drink every month or two    Drug use: No    Sexual activity: Not Currently     Partners: Male     Birth control/protection: Condom, Birth control pill       Family History:  Family History   Problem Relation Age of Onset    Irritable bowel syndrome Mother     Colon polyps Mother     Diabetes type II Mother     Heart disease Mother     Hypertension Mother     Diabetes Mother     Anxiety disorder Sister     Heart disease  "Maternal Grandmother     Diabetes type II Maternal Grandmother     Lung disease Maternal Grandmother     Hypertension Maternal Grandmother     Heart disease Maternal Grandfather     Hypertension Maternal Grandfather     Cancer Paternal Grandfather         non hodgkins lymphoma    Lymphoma Paternal Grandfather     Breast cancer Neg Hx     Ovarian cancer Neg Hx     Uterine cancer Neg Hx     Colon cancer Neg Hx     Crohn's disease Neg Hx     Ulcerative colitis Neg Hx         Review of Systems  See history of present illness and past medical history.  Patient denies headache, dizziness, syncope, falls, trauma, change in vision, change in hearing, change in taste, changes in weight, changes in appetite, focal weakness, numbness, or paresthesia.  Patient denies chest pain, palpitations, dyspnea, orthopnea, PND, cough, sinus pressure, rhinorrhea, epistaxis, hemoptysis, nausea, vomiting,hematemesis, diarrhea, constipation or hematochezia.  Denies cold or heat intolerance, polydipsia, polyuria, polyphagia. Denies hematuria, pyuria, dysuria, hesitancy, frequency or urgency. Denies consumption of raw and under cooked meats foods or change in water source.  Denies fever, chills, sweats, night sweats.  Denies missing any routine medications     Objective:  T Max 24 hrs: Temp (24hrs), Av.1 °F (36.7 °C), Min:97.9 °F (36.6 °C), Max:98.5 °F (36.9 °C)    Vitals Ranges:   Temp:  [97.9 °F (36.6 °C)-98.5 °F (36.9 °C)] 97.9 °F (36.6 °C)  Heart Rate:  [112-136] 112  Resp:  [18-30] 20  BP: (116-157)/() 153/77      Exam:  /77 (BP Location: Right arm, Patient Position: Lying)   Pulse 112   Temp 97.9 °F (36.6 °C) (Oral)   Resp 20   Ht 170 cm (66.93\")   Wt (!) 166 kg (365 lb 15.4 oz)   LMP 2024   SpO2 98%   BMI 57.44 kg/m²     General Appearance:    Alert, cooperative, no distress, appears stated age   Head:    Normocephalic, without obvious abnormality, atraumatic   Eyes:    PERRL, conjunctivae/corneas clear, " EOM's intact, both eyes   Ears:    Normal external ear canals, both ears   Nose:   Nares normal, septum midline, mucosa normal, no drainage    or sinus tenderness   Throat:   Lips, mucosa, and tongue normal   Neck:   Supple, symmetrical, trachea midline, no adenopathy;     thyroid:  no enlargement/tenderness/nodules; no carotid    bruit or JVD   Back:     Symmetric, no curvature, ROM normal, no CVA tenderness   Lungs:     Clear to auscultation bilaterally, respirations unlabored   Chest Wall:    No tenderness or deformity    Heart:    Regular rate and rhythm, S1 and S2 normal, no murmur, rub   or gallop   Abdomen:     Soft, nontender, bowel sounds active all four quadrants,     no masses, no hepatomegaly, no splenomegaly   Extremities:   Extremities normal, atraumatic, no cyanosis or edema                       .    Data Review:  Labs in chart were reviewed.  WBC   Date Value Ref Range Status   04/21/2024 20.11 (H) 3.40 - 10.80 10*3/mm3 Final     Hemoglobin   Date Value Ref Range Status   04/21/2024 12.3 12.0 - 15.9 g/dL Final     Hematocrit   Date Value Ref Range Status   04/21/2024 39.1 34.0 - 46.6 % Final     Platelets   Date Value Ref Range Status   04/21/2024 362 140 - 450 10*3/mm3 Final     Sodium   Date Value Ref Range Status   04/21/2024 140 136 - 145 mmol/L Final     Potassium   Date Value Ref Range Status   04/21/2024 3.8 3.5 - 5.2 mmol/L Final     Comment:     Slight hemolysis detected by analyzer. Result may be falsely elevated.     Chloride   Date Value Ref Range Status   04/21/2024 109 (H) 98 - 107 mmol/L Final     CO2   Date Value Ref Range Status   04/21/2024 15.4 (L) 22.0 - 29.0 mmol/L Final     BUN   Date Value Ref Range Status   04/21/2024 10 6 - 20 mg/dL Final     Creatinine   Date Value Ref Range Status   04/21/2024 0.66 0.57 - 1.00 mg/dL Final     Glucose   Date Value Ref Range Status   04/21/2024 82 65 - 99 mg/dL Final     Calcium   Date Value Ref Range Status   04/21/2024 7.0 (L) 8.6 - 10.5  mg/dL Final     Magnesium   Date Value Ref Range Status   04/21/2024 1.5 (L) 1.6 - 2.6 mg/dL Final                Imaging Results (All)       Procedure Component Value Units Date/Time    XR Chest 1 View [[id number]] Collected: 04/21/24 1348     Updated: 04/21/24 1352    Narrative:      XR CHEST 1 VW-     HISTORY: Female who is 34 years-old, short of breath     TECHNIQUE: Frontal view of the chest     COMPARISON: None available     FINDINGS: Heart, mediastinum and pulmonary vasculature are unremarkable.  No focal pulmonary consolidation, pleural effusion, or pneumothorax. No  acute osseous process.       Impression:      No focal pulmonary consolidation. Follow-up as clinical  indications persist.     This report was finalized on 4/21/2024 1:49 PM by Dr. Axel Porter M.D on Workstation: [facility]                 Assessment:  Active Hospital Problems    Diagnosis  POA    ** strep Pharyngitis [J02.9]  Yes      Resolved Hospital Problems   No resolved problems to display.   Acute hypoxic respiratory failure  Obesity  Metabolic acidosis    Plan:  Ct neck  ordered  Continue steroids, antibiotics  Add mininebs  Trend labs  Dw patient, mother    Lis Zaheer Morgan MD  4/21/2024  19:25 EDT

## 2024-04-21 NOTE — PLAN OF CARE
Goal Outcome Evaluation:   Sinus tach, other vital signs within normal limits.  Short of air, on 2 LPM per nasal cannula.  Up with standby assist.  Pain in throat and ears.  Waiting for attending MD to round.  Estimated discharge date: unknown.

## 2024-04-21 NOTE — ED NOTES
Pt still c/o nausea, no emesis but dry heaving reported. O2 now d/c'd and sat's maintaining above 95%. Pt reports pain in throat. IVF's infusing. MD notified of pt's n/v.

## 2024-04-21 NOTE — FSED PROVIDER NOTE
Subjective   History of Present Illness  34-year-old woman with a history of sore throat and ear fullness and pain.  She was seen on 4/19/2024 by the PCP midlevel.  At that time the complaint was a nonproductive cough, sore throat, postnasal drip sinus pressure vertigo, laryngitis and she had a panic attack while in the office.  They determined it was probably viral.  She is postop tonsillectomy as a child.  And they did not do swabs due to the rising panic attack.  They treated her with over-the-counter type medications.  This morning her throat continued to be sore and she felt like she could not breathe or swallow and may or may not of had a panic attack on top of actually having a swollen throat and airway.  She arrived in stridorous distress.  She arrived by car with her mother. Nuts cause throat swelling, she had not had any nuts that she or her mother is aware of.      Review of Systems    Past Medical History:   Diagnosis Date    Allergic     Anxiety     Asthma     Depression     Fatty liver 2015    GERD (gastroesophageal reflux disease)     Intermitent     Pepcid prn    Headache     Irritable bowel syndrome     Migraine     Morbid obesity with BMI of 50.0-59.9, adult 06/22/2020    Ovarian cyst     PHN (postherpetic neuralgia) 02/28/2017    Shingles     Sleep apnea        Allergies   Allergen Reactions    Cantaloupe (Diagnostic) Swelling    Nuts Swelling     THROAT    Doxycycline Nausea And Vomiting    Egg-Derived Products Rash     Per allergy tx/ GI UPSET       Past Surgical History:   Procedure Laterality Date    CHOLECYSTECTOMY      COLONOSCOPY      COLONOSCOPY N/A 9/14/2022    Procedure: COLONOSCOPY WITH BIOPSY;  Surgeon: Blayne George MD;  [facility]: [facility];  Service: Gastroenterology;  Laterality: N/A;  left colon biopsy  right colon biopsy  hemorhoids    CYSTECTOMY      bilateral    OVARIAN CYST SURGERY      Bilateral ovarian cystectomy - Reece You     PELVIC LAPAROSCOPY       TONSILLECTOMY      UPPER GASTROINTESTINAL ENDOSCOPY         Family History   Problem Relation Age of Onset    Irritable bowel syndrome Mother     Colon polyps Mother     Diabetes type II Mother     Heart disease Mother     Hypertension Mother     Diabetes Mother     Anxiety disorder Sister     Heart disease Maternal Grandmother     Diabetes type II Maternal Grandmother     Lung disease Maternal Grandmother     Hypertension Maternal Grandmother     Heart disease Maternal Grandfather     Hypertension Maternal Grandfather     Cancer Paternal Grandfather         non hodgkins lymphoma    Lymphoma Paternal Grandfather     Breast cancer Neg Hx     Ovarian cancer Neg Hx     Uterine cancer Neg Hx     Colon cancer Neg Hx     Crohn's disease Neg Hx     Ulcerative colitis Neg Hx        Social History     Socioeconomic History    Marital status: Single   Tobacco Use    Smoking status: Never    Smokeless tobacco: Never   Vaping Use    Vaping status: Never Used   Substance and Sexual Activity    Alcohol use: Not Currently     Alcohol/week: 1.0 - 2.0 standard drink of alcohol     Types: 1 - 2 Glasses of wine per week     Comment: One drink every month or two    Drug use: No    Sexual activity: Not Currently     Partners: Male     Birth control/protection: Condom, Birth control pill           Objective   Physical Exam  Vitals and nursing note reviewed.   Constitutional:       Appearance: She is well-developed. She is obese.   HENT:      Head: Atraumatic.   Cardiovascular:      Rate and Rhythm: Regular rhythm. Tachycardia present.      Pulses: Normal pulses.      Heart sounds: Normal heart sounds.   Pulmonary:      Effort: Tachypnea and respiratory distress present.      Breath sounds: Stridor present. No decreased breath sounds, wheezing, rhonchi or rales.   Musculoskeletal:         General: Normal range of motion.   Skin:     General: Skin is warm and dry.      Capillary Refill: Capillary refill takes less than 2 seconds.       Coloration: Skin is not pale.      Findings: No ecchymosis, erythema or rash.   Neurological:      General: No focal deficit present.      Mental Status: She is alert and oriented to person, place, and time. She is disoriented.   Psychiatric:         Mood and Affect: Mood normal.         Behavior: Behavior normal.         Procedures           ED Course  ED Course as of 04/21/24 1608   Sun Apr 21, 2024   1128 When trying to put in the Toradol order there is a contraindication and so I talked to the mother and the patient regarding adverse reactions to anti-inflammatories like ibuprofen or Toradol or others and there are none she has no reactions to antiinflammatories in the past.  That they are okay with her getting it today. [AR]   1228 Strep throat positive CBC shows a white blood count of 20,000 neutrophil percentage is 82.9 lymphocytes of 10% which is down and absolute neutrophils are elevated 16 [AR]   1255 Blood cultures are pending, negative pregnancy, [AR]   1334 Still having some nausea and still waiting for the CMP so giving her Reglan and may be the Pepcid IV will also cool down her stomach so she is more comfortable. [AR]   1346 Lactate(!!): 2.7  Patient had a spell of vomiting nonbloody nonbilious and this was after Zofran.  She then was given Reglan and the Pepcid also and that seemed to help a little bit more she is now done throwing up.  I discussed with her and her parents about getting admitted for a couple reason 1 is the throwing up to is how her airway was when she arrived and also the lactate is elevated and so is her white blood count suggesting she has been battling this for a while and this is not the time to go home.  They all agreed to that.  The only thing I still have pending is a CMP which right now it is not can to change the process of admission it might change in terms of the we need to give potassium electrolytes or something else.  I am placing a phone call out for [facility] hospitalist.  [AR]   1355 Chest x-ray does not show a consolidation suggesting pneumonia. [AR]   1409 5 points on septic scale, giving another Liter of IVF. [AR]   1420 CMP is back potassium is normal chlorides 109 little elevated CO2 from her fast breathing is little bit low at 15 calcium is little bit low at 7 when little bit over 8 is normal.  Remains low at 2.6 and total protein is low 5.2 anion gap is 15.6 this is only slightly above normal which is upper range is 15. [AR]   1421 Calcium(!): 7.0 [AR]   1421 Magnesium is 1.5 so is little bit low so she may benefit from magnesium IV. [AR]   1424 Patient is beginning to have a mild panic attack we will give her Ativan which she agreed to.  Caught her and her mother up-to-date on the admission and getting some magnesium Riya fluid and another dose of antibiotic. [AR]   1541 Ativan helped her emotional distress and anxiety [AR]   1607 EMS has arrived and taken patient to [facility]. [AR]      ED Course User Index  [AR] Cony Kwan MD                                           Medical Decision Making  Differential diagnosis includes but not limited to panic attack related to not feeling like she can breathe and the pain in her throat, allergic reaction to over-the-counter medications or actual infection swelling.  Will get infectious lab panel to include CBC BMP COVID flu strep chest x-ray for pneumonia and treat with Toradol and Solu-Medrol after she has had the appendectomy.  Toradol for pain which is what is really bothering her the most in her throat.  Lateral soft tissue neck in situ in the bed due to her significant response on arrival of stridor to see if there is any reason to do a CT and risk laying her flat.  Also a portable chest x-ray to look for pneumonia.  Having her IV fluids and leaving the oxygen on her for now.  Also getting EKG    Problems Addressed:  Generalized anxiety disorder: complicated acute illness or injury  Hypomagnesemia: complicated acute  illness or injury  Leukocytosis, unspecified type: complicated acute illness or injury  Sepsis due to group A Streptococcus without acute organ dysfunction: complicated acute illness or injury  Strep pharyngitis: complicated acute illness or injury    Amount and/or Complexity of Data Reviewed  Labs: ordered. Decision-making details documented in ED Course.  Radiology: ordered and independent interpretation performed.     Details: Portable chest x-ray 1 view no evidence of consolidation suggesting pneumonia.  No evidence of pleural effusions.  ECG/medicine tests: ordered and independent interpretation performed.     Details: Sinus tachycardia 115 low voltage precordial's no ST elevations or change in T waves.    Risk  Prescription drug management.  Decision regarding hospitalization.  Risk Details: The patient arrived being pushed in wheelchair to room 7 sounding stridorous, distressed, little gray looking.  She is able to get into the bed, face mask oxygen was placed although she was 96% on room air. She had been this way since around 7-8 am today.She does have throat swelling in relation to nuts, but had not had anything like that. She took some over the counter medications this morning. She does have a history of panic attacks producing shortness of breath, rapid breathing and tachycardia. Her last one was at the doctors office on 4/19/24. Patient profoundly obese and intubating her if needed would be a challenge. Not knowing for sure if it was a panic attack or stridor due to airway swelling, I chose to give her epinephrine IM to resolve the most dangerous cause of stridor and decrease the risk for the need of intubation. With this and calm coaching her and interacting with her, her stridor and tachypnea improved. I warned her about the side effects of the Epinephrine and when she had the shakes, I walked her through it and she improved and the side effects of the epinephrine resolved.  White blood count is  20,000 and the lactate is come back elevated.  Patient is had an episode several times of nausea and vomiting.  Chest xray ordered for evaluation for pneumonia.  Lateral x-ray of the neck soft tissue considered.  Do not feel comfortable of her laying flat in a CT at this time.  Labs for swabs and blood work to determine level of illness. Patient and mother agreed that she does not have a contraindication or allergic reaction to anti inflammatories so I gave her toradol for the sore throat and ear pain.  Patient experienced several episodes of vomiting.  Treated for this and given Decadron for a positive strep test white blood count 20,000 and elevated lactic acid.  At this point it has been determined that the patient needs to be admitted and the CMP is pending.  If there are any findings in the CMP that needs addressing here in the stand-alone ER we will do that. Review of treatment for sepsis 5pts on the scale, adding another NS 1L and another 1gm of Ceftriaxone as the strep throat is the source of her infection.      Critical Care  Total time providing critical care: 30 minutes        Final diagnoses:   Strep pharyngitis   Leukocytosis, unspecified type   Sepsis due to group A Streptococcus without acute organ dysfunction   Hypomagnesemia   Generalized anxiety disorder       ED Disposition  ED Disposition       ED Disposition   Decision to Admit    Condition   --    Comment   Level of Care: Telemetry [5]   Diagnosis: Pharyngitis [[id number]]   Admitting Physician: SUNSHINE WAGONER [[id number]]   Attending Physician: SUNSHINE WAGONER [[id number]]                 No follow-up provider specified.       Medication List        New Prescriptions      amoxicillin 500 MG capsule  Commonly known as: AMOXIL  Take 2 capsules by mouth 3 (Three) Times a Day for 10 days.     fluconazole 200 MG tablet  Commonly known as: DIFLUCAN  Take 1 tablet by mouth Daily for 3 days.               Where to Get Your Medications        These medications were  sent to [facility]/pharmacy #[id number] - [place], [place] - [address] - [phone]  - [phone] [facility] [facility] [address]      Phone: [phone]   amoxicillin 500 MG capsule  fluconazole 200 MG tablet

## 2024-04-21 NOTE — ED NOTES
RAINA paged at this time per MD Dr. Markie Kwan returned call at this time, 1400    Lashon Rivera RN

## 2024-04-21 NOTE — TELEPHONE ENCOUNTER
Pt's mother called the on-call line today on behalf of pt, noting worsening anxiety requiring doses of Klonopin at home, worsening throat pain to point of difficulty swallowing, and trouble breathing. She wanted to know what to do. I told her that I cannot make medical diagnoses over the phone but with those symptoms, she needs to go to an emergency department of their choosing TODAY for evaluation. Re-evaluation should not wait until tomorrow given those symptoms.

## 2024-04-22 ENCOUNTER — APPOINTMENT (OUTPATIENT)
Dept: CT IMAGING | Facility: HOSPITAL | Age: 34
End: 2024-04-22
Payer: MEDICAID

## 2024-04-22 PROBLEM — E87.20 LACTIC ACIDOSIS: Status: ACTIVE | Noted: 2024-04-22

## 2024-04-22 PROBLEM — J05.10 EPIGLOTTITIS: Status: ACTIVE | Noted: 2024-04-22

## 2024-04-22 PROBLEM — J02.0 STREP PHARYNGITIS: Status: ACTIVE | Noted: 2024-04-22

## 2024-04-22 LAB
ANION GAP SERPL CALCULATED.3IONS-SCNC: 13.5 MMOL/L (ref 5–15)
BUN SERPL-MCNC: 10 MG/DL (ref 6–20)
BUN/CREAT SERPL: 14.7 (ref 7–25)
CALCIUM SPEC-SCNC: 8.9 MG/DL (ref 8.6–10.5)
CHLORIDE SERPL-SCNC: 103 MMOL/L (ref 98–107)
CO2 SERPL-SCNC: 20.5 MMOL/L (ref 22–29)
CREAT SERPL-MCNC: 0.68 MG/DL (ref 0.57–1)
D-LACTATE SERPL-SCNC: 2.3 MMOL/L (ref 0.5–2)
D-LACTATE SERPL-SCNC: 2.8 MMOL/L (ref 0.5–2)
DEPRECATED RDW RBC AUTO: 46.4 FL (ref 37–54)
EGFRCR SERPLBLD CKD-EPI 2021: 116.6 ML/MIN/1.73
ERYTHROCYTE [DISTWIDTH] IN BLOOD BY AUTOMATED COUNT: 14.9 % (ref 12.3–15.4)
GLUCOSE SERPL-MCNC: 153 MG/DL (ref 65–99)
HCT VFR BLD AUTO: 33.8 % (ref 34–46.6)
HGB BLD-MCNC: 11 G/DL (ref 12–15.9)
MCH RBC QN AUTO: 27.7 PG (ref 26.6–33)
MCHC RBC AUTO-ENTMCNC: 32.5 G/DL (ref 31.5–35.7)
MCV RBC AUTO: 85.1 FL (ref 79–97)
PLATELET # BLD AUTO: 335 10*3/MM3 (ref 140–450)
PMV BLD AUTO: 9.8 FL (ref 6–12)
POTASSIUM SERPL-SCNC: 4.1 MMOL/L (ref 3.5–5.2)
RBC # BLD AUTO: 3.97 10*6/MM3 (ref 3.77–5.28)
SODIUM SERPL-SCNC: 137 MMOL/L (ref 136–145)
WBC NRBC COR # BLD AUTO: 18.16 10*3/MM3 (ref 3.4–10.8)

## 2024-04-22 PROCEDURE — 25010000002 METHYLPREDNISOLONE PER 40 MG: Performed by: INTERNAL MEDICINE

## 2024-04-22 PROCEDURE — 96376 TX/PRO/DX INJ SAME DRUG ADON: CPT

## 2024-04-22 PROCEDURE — 70491 CT SOFT TISSUE NECK W/DYE: CPT

## 2024-04-22 PROCEDURE — G0378 HOSPITAL OBSERVATION PER HR: HCPCS

## 2024-04-22 PROCEDURE — 25010000002 CEFTRIAXONE PER 250 MG: Performed by: INTERNAL MEDICINE

## 2024-04-22 PROCEDURE — 25510000001 IOPAMIDOL 61 % SOLUTION: Performed by: STUDENT IN AN ORGANIZED HEALTH CARE EDUCATION/TRAINING PROGRAM

## 2024-04-22 PROCEDURE — 85027 COMPLETE CBC AUTOMATED: CPT | Performed by: INTERNAL MEDICINE

## 2024-04-22 PROCEDURE — 83605 ASSAY OF LACTIC ACID: CPT | Performed by: EMERGENCY MEDICINE

## 2024-04-22 PROCEDURE — 80048 BASIC METABOLIC PNL TOTAL CA: CPT | Performed by: INTERNAL MEDICINE

## 2024-04-22 PROCEDURE — 63710000001 ONDANSETRON ODT 4 MG TABLET DISPERSIBLE: Performed by: INTERNAL MEDICINE

## 2024-04-22 PROCEDURE — 96366 THER/PROPH/DIAG IV INF ADDON: CPT

## 2024-04-22 PROCEDURE — 96361 HYDRATE IV INFUSION ADD-ON: CPT

## 2024-04-22 PROCEDURE — 25810000003 SODIUM CHLORIDE 0.9 % SOLUTION: Performed by: NURSE PRACTITIONER

## 2024-04-22 RX ORDER — MELATONIN
1000 DAILY
Status: DISCONTINUED | OUTPATIENT
Start: 2024-04-22 | End: 2024-04-23 | Stop reason: HOSPADM

## 2024-04-22 RX ORDER — DICYCLOMINE HYDROCHLORIDE 10 MG/1
10 CAPSULE ORAL 3 TIMES DAILY
Status: DISCONTINUED | OUTPATIENT
Start: 2024-04-22 | End: 2024-04-23 | Stop reason: HOSPADM

## 2024-04-22 RX ORDER — METFORMIN HYDROCHLORIDE 500 MG/1
1000 TABLET, EXTENDED RELEASE ORAL
Status: DISCONTINUED | OUTPATIENT
Start: 2024-04-22 | End: 2024-04-23 | Stop reason: HOSPADM

## 2024-04-22 RX ORDER — ESCITALOPRAM OXALATE 20 MG/1
20 TABLET ORAL DAILY
Status: DISCONTINUED | OUTPATIENT
Start: 2024-04-22 | End: 2024-04-23 | Stop reason: HOSPADM

## 2024-04-22 RX ORDER — FLUTICASONE PROPIONATE 50 MCG
2 SPRAY, SUSPENSION (ML) NASAL DAILY
Status: DISCONTINUED | OUTPATIENT
Start: 2024-04-22 | End: 2024-04-23 | Stop reason: HOSPADM

## 2024-04-22 RX ORDER — FAMOTIDINE 20 MG/1
10 TABLET, FILM COATED ORAL 2 TIMES DAILY PRN
Status: DISCONTINUED | OUTPATIENT
Start: 2024-04-22 | End: 2024-04-23 | Stop reason: HOSPADM

## 2024-04-22 RX ORDER — CLONAZEPAM 0.5 MG/1
0.5 TABLET ORAL 2 TIMES DAILY PRN
Status: DISCONTINUED | OUTPATIENT
Start: 2024-04-21 | End: 2024-04-23 | Stop reason: HOSPADM

## 2024-04-22 RX ORDER — ALBUTEROL SULFATE 2.5 MG/3ML
2.5 SOLUTION RESPIRATORY (INHALATION) EVERY 6 HOURS PRN
Status: DISCONTINUED | OUTPATIENT
Start: 2024-04-21 | End: 2024-04-23 | Stop reason: HOSPADM

## 2024-04-22 RX ORDER — MONTELUKAST SODIUM 10 MG/1
10 TABLET ORAL NIGHTLY
Status: DISCONTINUED | OUTPATIENT
Start: 2024-04-22 | End: 2024-04-23 | Stop reason: HOSPADM

## 2024-04-22 RX ORDER — DIPHENHYDRAMINE HCL 25 MG
25 CAPSULE ORAL EVERY 6 HOURS PRN
Status: DISCONTINUED | OUTPATIENT
Start: 2024-04-22 | End: 2024-04-23 | Stop reason: HOSPADM

## 2024-04-22 RX ORDER — DIPHENOXYLATE HYDROCHLORIDE AND ATROPINE SULFATE 2.5; .025 MG/1; MG/1
1 TABLET ORAL DAILY
Status: DISCONTINUED | OUTPATIENT
Start: 2024-04-22 | End: 2024-04-23 | Stop reason: HOSPADM

## 2024-04-22 RX ADMIN — CLONAZEPAM 0.5 MG: 0.5 TABLET ORAL at 01:18

## 2024-04-22 RX ADMIN — SODIUM CHLORIDE 100 ML/HR: 9 INJECTION, SOLUTION INTRAVENOUS at 07:24

## 2024-04-22 RX ADMIN — Medication 1 TABLET: at 09:16

## 2024-04-22 RX ADMIN — ONDANSETRON 4 MG: 4 TABLET, ORALLY DISINTEGRATING ORAL at 09:16

## 2024-04-22 RX ADMIN — METHYLPREDNISOLONE SODIUM SUCCINATE 40 MG: 40 INJECTION, POWDER, LYOPHILIZED, FOR SOLUTION INTRAMUSCULAR; INTRAVENOUS at 05:36

## 2024-04-22 RX ADMIN — ACETAMINOPHEN 325MG 650 MG: 325 TABLET ORAL at 05:36

## 2024-04-22 RX ADMIN — DICYCLOMINE HYDROCHLORIDE 10 MG: 10 CAPSULE ORAL at 21:38

## 2024-04-22 RX ADMIN — ACETAMINOPHEN 325MG 650 MG: 325 TABLET ORAL at 13:43

## 2024-04-22 RX ADMIN — Medication 1000 UNITS: at 09:16

## 2024-04-22 RX ADMIN — CEFTRIAXONE 2000 MG: 2 INJECTION, POWDER, FOR SOLUTION INTRAMUSCULAR; INTRAVENOUS at 13:43

## 2024-04-22 RX ADMIN — MONTELUKAST SODIUM 10 MG: 10 TABLET, FILM COATED ORAL at 01:18

## 2024-04-22 RX ADMIN — ACETAMINOPHEN 325MG 650 MG: 325 TABLET ORAL at 19:48

## 2024-04-22 RX ADMIN — METHYLPREDNISOLONE SODIUM SUCCINATE 40 MG: 40 INJECTION, POWDER, LYOPHILIZED, FOR SOLUTION INTRAMUSCULAR; INTRAVENOUS at 21:38

## 2024-04-22 RX ADMIN — IOPAMIDOL 85 ML: 612 INJECTION, SOLUTION INTRAVENOUS at 08:54

## 2024-04-22 RX ADMIN — ACETAMINOPHEN 325MG 650 MG: 325 TABLET ORAL at 09:16

## 2024-04-22 RX ADMIN — MONTELUKAST SODIUM 10 MG: 10 TABLET, FILM COATED ORAL at 21:38

## 2024-04-22 RX ADMIN — DICYCLOMINE HYDROCHLORIDE 10 MG: 10 CAPSULE ORAL at 15:48

## 2024-04-22 RX ADMIN — METHYLPREDNISOLONE SODIUM SUCCINATE 40 MG: 40 INJECTION, POWDER, LYOPHILIZED, FOR SOLUTION INTRAMUSCULAR; INTRAVENOUS at 12:14

## 2024-04-22 RX ADMIN — DICYCLOMINE HYDROCHLORIDE 10 MG: 10 CAPSULE ORAL at 09:16

## 2024-04-22 RX ADMIN — METFORMIN HYDROCHLORIDE 1000 MG: 500 TABLET, EXTENDED RELEASE ORAL at 09:16

## 2024-04-22 RX ADMIN — ESCITALOPRAM 20 MG: 20 TABLET, FILM COATED ORAL at 09:20

## 2024-04-22 RX ADMIN — FAMOTIDINE 10 MG: 20 TABLET, FILM COATED ORAL at 17:02

## 2024-04-22 NOTE — PLAN OF CARE
Goal Outcome Evaluation:  Plan of Care Reviewed With: patient, father        Progress: improving  Outcome Evaluation: Patient has been alert and oriented this shift. Complaints of headache/ear pressure and sore throat. PRN Tylenol given x2 with positive effects. Stated Flonase really helped with the pressure. Patient has been up ad rosangela. Removed O2 earlier this am and has tolerated being on room air all shift. Appetite and fluid intact adequate. Voiding well. New IV placed in LFA. Tolerated IV abt and IV steroids well. Continues with redness and swelling to back of throat. Hopeful for discharge tomorrow. Vital signs stable. Call light in reach. Safety maintained.

## 2024-04-22 NOTE — SIGNIFICANT NOTE
04/22/24 0928   OTHER   Discipline physical therapist   Rehab Time/Intention   Session Not Performed other (see comments)  (spoke with nsg, pt up adlib in room, PT will sign off, reconsult if mobility declines)   Therapy Assessment/Plan (PT)   Criteria for Skilled Interventions Met (PT) no;no problems identified which require skilled intervention

## 2024-04-22 NOTE — PLAN OF CARE
Goal Outcome Evaluation:  Plan of Care Reviewed With: patient        Progress: no change  Outcome Evaluation: Maintained on 2L N/C, afebrile. LA noted, IVF's increased.  Pt c/o's of headache, sore throat and ear pain. Relief with PRN Tylenol noted. Pt alternating hot and cold/icy liquids to ease throat discomfort with good effect. BRP with SBA, tolerated fair.

## 2024-04-22 NOTE — PROGRESS NOTES
Name: Lanie Moctezuma ADMIT: 2024   : 1990  PCP: Joanne Urbina APRN    MRN: [id number] LOS: 0 days   AGE/SEX: 34 y.o. female  ROOM: [facility]     Subjective   Subjective   She is feeling better today.  Says her breathing is improved.    Objective   Objective   Vital Signs  Temp:  [97.5 °F (36.4 °C)-98.4 °F (36.9 °C)] 98.2 °F (36.8 °C)  Heart Rate:  [] 80  Resp:  [18-20] 18  BP: (116-157)/() 124/64  SpO2:  [88 %-99 %] 99 %  on  Flow (L/min):  [2] 2;   Device (Oxygen Therapy): room air  Body mass index is 62.35 kg/m².  Physical Exam  Constitutional:       General: She is not in acute distress.     Appearance: Normal appearance. She is obese. She is not toxic-appearing.   Cardiovascular:      Rate and Rhythm: Normal rate and regular rhythm.   Pulmonary:      Effort: Pulmonary effort is normal.      Breath sounds: No stridor.   Abdominal:      General: Abdomen is flat. Bowel sounds are normal.      Palpations: Abdomen is soft.   Skin:     General: Skin is warm.   Neurological:      General: No focal deficit present.      Mental Status: She is alert and oriented to person, place, and time.   Psychiatric:         Mood and Affect: Mood normal.         Behavior: Behavior normal.         Results Review     I reviewed the patient's new clinical results.  Results from last 7 days   Lab Units 24  0308 24  1142   WBC 10*3/mm3 18.16* 20.11*   HEMOGLOBIN g/dL 11.0* 12.3   PLATELETS 10*3/mm3 335 362     Results from last 7 days   Lab Units 24  0308 24  1142   SODIUM mmol/L 137 140   POTASSIUM mmol/L 4.1 3.8   CHLORIDE mmol/L 103 109*   CO2 mmol/L 20.5* 15.4*   BUN mg/dL 10 10   CREATININE mg/dL 0.68 0.66   GLUCOSE mg/dL 153* 82   EGFR mL/min/1.73 116.6 117.5     Results from last 7 days   Lab Units 24  1142   ALBUMIN g/dL 2.6*   BILIRUBIN mg/dL <0.2   ALK PHOS U/L 75   AST (SGOT) U/L 12   ALT (SGPT) U/L 14     Results from last 7 days   Lab Units 24  0474  "04/21/24  1142   CALCIUM mg/dL 8.9 7.0*   ALBUMIN g/dL  --  2.6*   MAGNESIUM mg/dL  --  1.5*     Results from last 7 days   Lab Units 04/22/24  0916 04/22/24  0308 04/21/24  2116 04/21/24  1757   LACTATE mmol/L 2.3* 2.8* 3.1* 2.3*     No results found for: \"HGBA1C\", \"POCGLU\"    CT Soft Tissue Neck With Contrast    Result Date: 4/22/2024  The epiglottis is prominent. Epiglottitis cannot be excluded. The cords are difficult to assess as they are apposed but there is no evidence of subglottic edema. There is mild parapharyngeal soft tissue prominence bilaterally more prominent on the right but with no evidence of abscess. The above information was called to and discussed with Dr. Maier who is covering for Dr. Morgan at 0935 hours.   Radiation dose reduction techniques were utilized, including automated exposure control and exposure modulation based on body size.       XR Chest 1 View    Result Date: 4/21/2024  No focal pulmonary consolidation. Follow-up as clinical indications persist.  This report was finalized on 4/21/2024 1:49 PM by Dr. Axel Porter M.D [facility]     Scheduled Medications  cefTRIAXone, 2,000 mg, Intravenous, Q24H  cholecalciferol, 1,000 Units, Oral, Daily  dicyclomine, 10 mg, Oral, TID  escitalopram, 20 mg, Oral, Daily  fluticasone, 2 spray, Each Nare, Daily  metFORMIN ER, 1,000 mg, Oral, Daily With Breakfast  methylPREDNISolone sodium succinate, 40 mg, Intravenous, Q8H  montelukast, 10 mg, Oral, Nightly  multivitamin, 1 tablet, Oral, Daily    Infusions     Diet  Diet: Cardiac; Healthy Heart (2-3 Na+); Fluid Consistency: Thin (IDDSI 0)       Assessment/Plan     Active Hospital Problems    Diagnosis  POA    **Pharyngitis [J02.9]  Yes    Epiglottitis [J05.10]  Yes    Strep pharyngitis [J02.0]  Yes    Obstructive sleep apnea syndrome [G47.33]  Yes      Resolved Hospital Problems   No resolved problems to display.       34 y.o. female admitted with " Pharyngitis.      04/22/24  Discussed with radiologist.  Given mild epiglottitis will monitor overnight.  Possible DC tomorrow.    Group A strep pharyngitis  Epiglottitis, mild  -CT scan with mild epiglottitis, no signs of airway compromise  -Her breathing is improved and she has no signs of respiratory distress or stridor.  Will plan to monitor overnight given mild swelling.  Suspect that she will be able to discharge tomorrow on oral amoxicillin as she is now tolerating p.o. without difficulty.  -Continue Rocephin while inpatient      Flow (L/min):  [2] 2    DVT prophylaxis: SCDs  Discussed with patient, family, and care team on multidisciplinary rounds.  Anticipated discharge home, tomorrow            Devante Ramos MD  [facility]  04/22/24  12:28 EDT

## 2024-04-22 NOTE — CASE MANAGEMENT/SOCIAL WORK
Discharge Planning Assessment  [facility]     Patient Name: Lanie Moctezuma  MRN: [id number]  Today's Date: 4/22/2024    Admit Date: 4/21/2024    Plan: Home, family will transport home   Discharge Needs Assessment       Row Name 04/22/24 1400       Living Environment    People in Home parent(s)    Name(s) of People in Home Jordana Moctezuma [phone]    Current Living Arrangements home    Potentially Unsafe Housing Conditions none    In the past 12 months has the electric, gas, oil, or water company threatened to shut off services in your home? No    Primary Care Provided by self    Provides Primary Care For no one    Family Caregiver if Needed spouse    Family Caregiver Names Jordana Moctezuma    Quality of Family Relationships helpful;involved    Able to Return to Prior Arrangements yes       Resource/Environmental Concerns    Resource/Environmental Concerns none    Transportation Concerns no car       Transportation Needs    In the past 12 months, has lack of transportation kept you from medical appointments or from getting medications? no    In the past 12 months, has lack of transportation kept you from meetings, work, or from getting things needed for daily living? No       Food Insecurity    Within the past 12 months, you worried that your food would run out before you got the money to buy more. Never true    Within the past 12 months, the food you bought just didn't last and you didn't have money to get more. Never true       Transition Planning    Patient/Family Anticipates Transition to home;home with family    Patient/Family Anticipated Services at Transition none    Transportation Anticipated family or friend will provide       Discharge Needs Assessment    Equipment Currently Used at Home none    Concerns to be Addressed no discharge needs identified;denies needs/concerns at this time    Anticipated Changes Related to Illness none    Equipment Needed After Discharge none                   Discharge  Plan       Row Name 04/22/24 1405       Plan    Plan Home, family will transport home    Patient/Family in Agreement with Plan yes    Provided Post Acute Provider List? N/A    Provided Post Acute Provider Quality & Resource List? N/A    Plan Comments Met with pt at bedside. Introduced self and explained role of . Face sheet verified, PCP is Joanne Urbina. Pt denies any difficulty paying for medications, and she obtains her medications from [facility]. Pt lives with her parents, and stated that her mother, Jordana, could assist with any care needs that may arise. Pt is independent in ADL's and does not use any assistive devices. Pt has never had home health or rehab and she does not anticipate requiring those services upon discharge. Upon discharge, pt stated that her family will transport home. Explained that CCP would follow to assess for discharge needs.                  Continued Care and Services - Admitted Since 4/21/2024    No active coordination exists for this encounter.       Expected Discharge Date and Time       Expected Discharge Date Expected Discharge Time    Apr 24, 2024            Demographic Summary    No documentation.                  Functional Status    No documentation.                  Psychosocial    No documentation.                  Abuse/Neglect    No documentation.                  Legal    No documentation.                  Substance Abuse    No documentation.                  Patient Forms    No documentation.                     Joanne Caro RN

## 2024-04-22 NOTE — SIGNIFICANT NOTE
04/22/24 1126   OTHER   Discipline occupational therapist   Rehab Time/Intention   Session Not Performed other (see comments)  (Per nsg, pt is up ad rosangela in room. Spoke w/ pt who denies difficulty w/ mobility or ADLs. No OT needs identified at this time. Reconsult as needed if mobility declines)

## 2024-04-23 ENCOUNTER — READMISSION MANAGEMENT (OUTPATIENT)
Dept: CALL CENTER | Facility: HOSPITAL | Age: 34
End: 2024-04-23
Payer: MEDICAID

## 2024-04-23 VITALS
RESPIRATION RATE: 18 BRPM | HEIGHT: 67 IN | TEMPERATURE: 98.4 F | BODY MASS INDEX: 45.99 KG/M2 | SYSTOLIC BLOOD PRESSURE: 132 MMHG | HEART RATE: 65 BPM | OXYGEN SATURATION: 95 % | DIASTOLIC BLOOD PRESSURE: 82 MMHG | WEIGHT: 293 LBS

## 2024-04-23 PROBLEM — E87.20 LACTIC ACIDOSIS: Status: RESOLVED | Noted: 2024-04-22 | Resolved: 2024-04-23

## 2024-04-23 PROBLEM — J05.10 EPIGLOTTITIS: Status: RESOLVED | Noted: 2024-04-22 | Resolved: 2024-04-23

## 2024-04-23 LAB
ANION GAP SERPL CALCULATED.3IONS-SCNC: 15.5 MMOL/L (ref 5–15)
BASOPHILS # BLD AUTO: 0.03 10*3/MM3 (ref 0–0.2)
BASOPHILS NFR BLD AUTO: 0.2 % (ref 0–1.5)
BUN SERPL-MCNC: 18 MG/DL (ref 6–20)
BUN/CREAT SERPL: 23.7 (ref 7–25)
CALCIUM SPEC-SCNC: 8.8 MG/DL (ref 8.6–10.5)
CHLORIDE SERPL-SCNC: 102 MMOL/L (ref 98–107)
CO2 SERPL-SCNC: 20.5 MMOL/L (ref 22–29)
CREAT SERPL-MCNC: 0.76 MG/DL (ref 0.57–1)
D-LACTATE SERPL-SCNC: 2.3 MMOL/L (ref 0.5–2)
DEPRECATED RDW RBC AUTO: 44.8 FL (ref 37–54)
EGFRCR SERPLBLD CKD-EPI 2021: 104.9 ML/MIN/1.73
EOSINOPHIL # BLD AUTO: 0 10*3/MM3 (ref 0–0.4)
EOSINOPHIL NFR BLD AUTO: 0 % (ref 0.3–6.2)
ERYTHROCYTE [DISTWIDTH] IN BLOOD BY AUTOMATED COUNT: 14.8 % (ref 12.3–15.4)
GLUCOSE SERPL-MCNC: 126 MG/DL (ref 65–99)
HCT VFR BLD AUTO: 33.9 % (ref 34–46.6)
HGB BLD-MCNC: 10.9 G/DL (ref 12–15.9)
IMM GRANULOCYTES # BLD AUTO: 0.37 10*3/MM3 (ref 0–0.05)
IMM GRANULOCYTES NFR BLD AUTO: 2.4 % (ref 0–0.5)
LYMPHOCYTES # BLD AUTO: 1.95 10*3/MM3 (ref 0.7–3.1)
LYMPHOCYTES NFR BLD AUTO: 12.6 % (ref 19.6–45.3)
MCH RBC QN AUTO: 26.8 PG (ref 26.6–33)
MCHC RBC AUTO-ENTMCNC: 32.2 G/DL (ref 31.5–35.7)
MCV RBC AUTO: 83.3 FL (ref 79–97)
MONOCYTES # BLD AUTO: 0.65 10*3/MM3 (ref 0.1–0.9)
MONOCYTES NFR BLD AUTO: 4.2 % (ref 5–12)
NEUTROPHILS NFR BLD AUTO: 12.5 10*3/MM3 (ref 1.7–7)
NEUTROPHILS NFR BLD AUTO: 80.6 % (ref 42.7–76)
NRBC BLD AUTO-RTO: 0 /100 WBC (ref 0–0.2)
PLATELET # BLD AUTO: 369 10*3/MM3 (ref 140–450)
PMV BLD AUTO: 9.6 FL (ref 6–12)
POTASSIUM SERPL-SCNC: 4.6 MMOL/L (ref 3.5–5.2)
RBC # BLD AUTO: 4.07 10*6/MM3 (ref 3.77–5.28)
SODIUM SERPL-SCNC: 138 MMOL/L (ref 136–145)
WBC NRBC COR # BLD AUTO: 15.5 10*3/MM3 (ref 3.4–10.8)

## 2024-04-23 PROCEDURE — 96376 TX/PRO/DX INJ SAME DRUG ADON: CPT

## 2024-04-23 PROCEDURE — G0378 HOSPITAL OBSERVATION PER HR: HCPCS

## 2024-04-23 PROCEDURE — 85025 COMPLETE CBC W/AUTO DIFF WBC: CPT | Performed by: STUDENT IN AN ORGANIZED HEALTH CARE EDUCATION/TRAINING PROGRAM

## 2024-04-23 PROCEDURE — 25010000002 METHYLPREDNISOLONE PER 40 MG: Performed by: INTERNAL MEDICINE

## 2024-04-23 PROCEDURE — 83605 ASSAY OF LACTIC ACID: CPT | Performed by: STUDENT IN AN ORGANIZED HEALTH CARE EDUCATION/TRAINING PROGRAM

## 2024-04-23 PROCEDURE — 80048 BASIC METABOLIC PNL TOTAL CA: CPT | Performed by: STUDENT IN AN ORGANIZED HEALTH CARE EDUCATION/TRAINING PROGRAM

## 2024-04-23 RX ORDER — AMOXICILLIN 250 MG/1
500 CAPSULE ORAL EVERY 12 HOURS SCHEDULED
Qty: 32 CAPSULE | Refills: 0 | Status: DISCONTINUED | OUTPATIENT
Start: 2024-04-23 | End: 2024-04-23 | Stop reason: HOSPADM

## 2024-04-23 RX ORDER — AMOXICILLIN 500 MG/1
500 CAPSULE ORAL EVERY 12 HOURS SCHEDULED
Qty: 16 CAPSULE | Refills: 0 | Status: SHIPPED | OUTPATIENT
Start: 2024-04-23 | End: 2024-05-01

## 2024-04-23 RX ADMIN — DICYCLOMINE HYDROCHLORIDE 10 MG: 10 CAPSULE ORAL at 08:41

## 2024-04-23 RX ADMIN — METFORMIN HYDROCHLORIDE 1000 MG: 500 TABLET, EXTENDED RELEASE ORAL at 08:41

## 2024-04-23 RX ADMIN — FLUTICASONE PROPIONATE 2 SPRAY: 50 SPRAY, METERED NASAL at 08:43

## 2024-04-23 RX ADMIN — AMOXICILLIN 500 MG: 250 CAPSULE ORAL at 09:24

## 2024-04-23 RX ADMIN — CLONAZEPAM 0.5 MG: 0.5 TABLET ORAL at 06:32

## 2024-04-23 RX ADMIN — ACETAMINOPHEN 325MG 650 MG: 325 TABLET ORAL at 06:29

## 2024-04-23 RX ADMIN — ACETAMINOPHEN 325MG 650 MG: 325 TABLET ORAL at 00:10

## 2024-04-23 RX ADMIN — Medication 1000 UNITS: at 08:42

## 2024-04-23 RX ADMIN — Medication 1 TABLET: at 08:41

## 2024-04-23 RX ADMIN — ESCITALOPRAM 20 MG: 20 TABLET, FILM COATED ORAL at 08:41

## 2024-04-23 RX ADMIN — METHYLPREDNISOLONE SODIUM SUCCINATE 40 MG: 40 INJECTION, POWDER, LYOPHILIZED, FOR SOLUTION INTRAMUSCULAR; INTRAVENOUS at 06:29

## 2024-04-23 NOTE — PLAN OF CARE
Goal Outcome Evaluation:  Plan of Care Reviewed With: patient           Outcome Evaluation: VSS, Tylenol given for pain with some relief. Pt showered after order obtained. Pt c/o feeling shaky, a little SOA, and in pain at 0615. Meds given and O2 sats >90%. Will CTM, safety maintained.

## 2024-04-23 NOTE — CASE MANAGEMENT/SOCIAL WORK
Case Management Discharge Note      Final Note: Home, family to transport    Provided Post Acute Provider List?: N/A  Provided Post Acute Provider Quality & Resource List?: N/A    Selected Continued Care - Discharged on 4/23/2024 Admission date: 4/21/2024 - Discharge disposition: Home or Self Care      Destination    No services have been selected for the patient.                Durable Medical Equipment    No services have been selected for the patient.                Dialysis/Infusion    No services have been selected for the patient.                Home Medical Care    No services have been selected for the patient.                Therapy    No services have been selected for the patient.                Community Resources    No services have been selected for the patient.                Community & DME    No services have been selected for the patient.                    Transportation Services  Private: Car    Final Discharge Disposition Code: 01 - home or self-care

## 2024-04-23 NOTE — PLAN OF CARE
Goal Outcome Evaluation:  Plan of Care Reviewed With: patient        Progress: improving  Outcome Evaluation: Patient is alert and oriented. Up ad rosangela in room. Appetite and fluid intake adequate. Continues with complaints of throat pain and headache, but states flonase, tylenol and cold liquids are helping. Has maintained greater than 90% on room air. Throat remains reddened but not as swollen. No complaints of soa. Vital signs stable. Call light in reach. Safety maintained. New order for discharge, pending lab results. Labs returned and MD notified for review. MD notified this RN that patient was ok for discharge. Will remove IV and tele when family arrives. Meds to bed have been delievered.

## 2024-04-23 NOTE — OUTREACH NOTE
Prep Survey      Flowsheet Row Responses   Hindu facility patient discharged from? [place]   Is LACE score < 7 ? Yes   Eligibility [facility]   Date of Admission 04/21/24   Date of Discharge 04/23/24   Discharge Disposition Home or Self Care   Discharge diagnosis Pharyngitis   Does the patient have one of the following disease processes/diagnoses(primary or secondary)? Other   Does the patient have Home health ordered? No   Is there a DME ordered? No   Prep survey completed? Yes            ENIO A - Registered Nurse

## 2024-04-23 NOTE — DISCHARGE SUMMARY
"    Patient Name: Lanie Moctezuma  : 1990  MRN: [id number]    Date of Admission: 2024  Date of Discharge:  2024  Primary Care Physician: Joanne Urbina APRN      Chief Complaint:   Shortness of Breath      Discharge Diagnoses     Active Hospital Problems    Diagnosis  POA    **Pharyngitis [J02.9]  Yes    Strep pharyngitis [J02.0]  Yes    Obstructive sleep apnea syndrome [G47.33]  Yes      Resolved Hospital Problems    Diagnosis Date Resolved POA    Epiglottitis [J05.10] 2024 Yes    Lactic acidosis [E87.20] 2024 Yes        Admitting HPI     \"34 year old female who presented to the emergency room at [facility] with as sore throat, ear ache, sinus pain and dizziness; she was seen by her pcp but had a panic attack so she was not tested for strep; she has had a tonsillectomy; she has had some difficulty swallowing and was felt to be stridorous on arrival to the ED; she tested positive for strep; she has had some improvement with steroids and antibiotics; transfer for admission was arranged \"    Hospital Course     Pt admitted for strep throat with possible stridor on admission.  Underwent CT of the neck which showed mild epiglottitis.  She was monitored overnight with no worsening dyspnea.  No stridor on exam and no concern for respiratory compromise.  She is tolerating p.o. diet and breathing comfortably on room air.  WBC downtrending on day of discharge.  Encourage p.o. fluid intake.  Recommend follow-up with PCP within 1 week for repeat labs.  Will plan to discharge with amoxicillin to complete total 10 days for strep pharyngitis.    Discharge Plan     Group A strep pharyngitis  Epiglottitis, mild  -CT scan with mild epiglottitis, no signs of airway compromise  -Her breathing is improved and she has no signs of respiratory distress or stridor.  Monitored overnight.  No issues.  Tolerating p.o. without respiratory issues.  -Discharged with amoxicillin 500 mg twice daily to " complete 10 days    Day of Discharge     Physical Exam:  Temp:  [97.3 °F (36.3 °C)-98.4 °F (36.9 °C)] 98.4 °F (36.9 °C)  Heart Rate:  [65-97] 65  Resp:  [18] 18  BP: (132-145)/(64-87) 132/82  Body mass index is 62.35 kg/m².  Physical Exam  Constitutional:       General: She is not in acute distress.     Appearance: Normal appearance. She is obese. She is not toxic-appearing.   Cardiovascular:      Rate and Rhythm: Normal rate and regular rhythm.   Pulmonary:      Effort: Pulmonary effort is normal.      Breath sounds: No stridor.   Abdominal:      General: Abdomen is flat. Bowel sounds are normal.      Palpations: Abdomen is soft.   Skin:     General: Skin is warm.   Neurological:      General: No focal deficit present.      Mental Status: She is alert and oriented to person, place, and time.   Psychiatric:         Mood and Affect: Mood normal.         Behavior: Behavior normal.     Consultants     Consult Orders (all) (From admission, onward)      None          Procedures     * Surgery not found *      Imaging Results (All)       Procedure Component Value Units Date/Time    CT Soft Tissue Neck With Contrast [[id number]] Collected: 04/22/24 0943     Updated: 04/22/24 0943    Narrative:      CT NECK WITH CONTRAST     HISTORY: Epiglottitis or tonsillitis suspected.     COMPARISON: None.     FINDINGS: The parotid, submandibular and thyroid glands appear  unremarkable. Small subcentimeter nodes are identified involving the  posterior triangle of the neck bilaterally.     There is no evidence of a peripherally enhancing fluid collection to  suggest abscess. The parapharyngeal tonsils are mildly prominent  bilaterally, slightly more prominent on the right. The epiglottis is  mildly prominent. The cords are apposed. There is no evidence of  subglottic edema.       Impression:      The epiglottis is prominent. Epiglottitis cannot be  excluded. The cords are difficult to assess as they are apposed but  there is no evidence of  subglottic edema. There is mild parapharyngeal  soft tissue prominence bilaterally more prominent on the right but with  no evidence of abscess. The above information was called to and  discussed with Dr. Maier who is covering for Dr. Morgan at 0935 hours.        Radiation dose reduction techniques were utilized, including automated  exposure control and exposure modulation based on body size.          XR Chest 1 View [[id number]] Collected: 04/21/24 1348     Updated: 04/21/24 1352    Narrative:      XR CHEST 1 VW-     HISTORY: Female who is 34 years-old, short of breath     TECHNIQUE: Frontal view of the chest     COMPARISON: None available     FINDINGS: Heart, mediastinum and pulmonary vasculature are unremarkable.  No focal pulmonary consolidation, pleural effusion, or pneumothorax. No  acute osseous process.       Impression:      No focal pulmonary consolidation. Follow-up as clinical  indications persist.     This report was finalized on 4/21/2024 1:49 PM by Dr. Axel Porter M.D on Workstation: [id number]               Results for orders placed during the hospital encounter of 02/13/20    Adult Transthoracic Echo Complete W/ Cont if Necessary Per Protocol    Interpretation Summary  · Left ventricular systolic function is normal.  · Calculated EF = 60%.    Pertinent Labs     Results from last 7 days   Lab Units 04/23/24  0814 04/22/24  0308 04/21/24  1142   WBC 10*3/mm3 15.50* 18.16* 20.11*   HEMOGLOBIN g/dL 10.9* 11.0* 12.3   PLATELETS 10*3/mm3 369 335 362     Results from last 7 days   Lab Units 04/23/24  0814 04/22/24  0308 04/21/24  1142   SODIUM mmol/L 138 137 140   POTASSIUM mmol/L 4.6 4.1 3.8   CHLORIDE mmol/L 102 103 109*   CO2 mmol/L 20.5* 20.5* 15.4*   BUN mg/dL 18 10 10   CREATININE mg/dL 0.76 0.68 0.66   GLUCOSE mg/dL 126* 153* 82   EGFR mL/min/1.73 104.9 116.6 117.5     Results from last 7 days   Lab Units 04/21/24  1142   ALBUMIN g/dL 2.6*   BILIRUBIN mg/dL <0.2   ALK PHOS U/L 75   AST  "(SGOT) U/L 12   ALT (SGPT) U/L 14     Results from last 7 days   Lab Units 04/23/24  0814 04/22/24  0308 04/21/24  1142   CALCIUM mg/dL 8.8 8.9 7.0*   ALBUMIN g/dL  --   --  2.6*   MAGNESIUM mg/dL  --   --  1.5*       Results from last 7 days   Lab Units 04/21/24  1142   PROBNP pg/mL 76.1           Invalid input(s): \"LDLCALC\"  Results from last 7 days   Lab Units 04/21/24  1146 04/21/24  1142   BLOODCX  No growth at 24 hours No growth at 24 hours     Results from last 7 days   Lab Units 04/21/24  1143   COVID19  Not Detected       Test Results Pending at Discharge     Pending Labs       Order Current Status    Blood Culture - Blood, Arm, Left Preliminary result    Blood Culture - Blood, Arm, Right Preliminary result            Discharge Details        Discharge Medications        New Medications        Instructions Start Date   amoxicillin 500 MG capsule  Commonly known as: AMOXIL   500 mg, Oral, Every 12 Hours Scheduled      fluconazole 200 MG tablet  Commonly known as: DIFLUCAN   200 mg, Oral, Daily             Continue These Medications        Instructions Start Date   acetaminophen 500 MG tablet  Commonly known as: TYLENOL   1,000 mg, Oral, Every 6 Hours PRN      albuterol sulfate  (90 Base) MCG/ACT inhaler  Commonly known as: PROVENTIL HFA;VENTOLIN HFA;PROAIR HFA   2 puffs, Inhalation, Every 4 Hours PRN      cholecalciferol 250 MCG (30790 UT) capsule  Commonly known as: VITAMIN D3   9,000 Units      clonazePAM 0.5 MG tablet  Commonly known as: KlonoPIN   0.5 mg, Oral, 2 Times Daily PRN      colestipol 1 g tablet  Commonly known as: COLESTID   2 g, Oral, Daily      dicyclomine 20 MG tablet  Commonly known as: BENTYL   20 mg, Oral, 3 Times Daily      diphenhydrAMINE 25 MG tablet  Commonly known as: BENADRYL   25 mg, Oral, Every 6 Hours PRN      escitalopram 20 MG tablet  Commonly known as: LEXAPRO   1 tablet, Oral, Daily      famotidine 10 MG tablet  Commonly known as: PEPCID   10 mg, Oral, 2 Times Daily " PRN      ferrous sulfate 324 (65 Fe) MG tablet delayed-release EC tablet   65 mg, Oral, 3 Times Daily      fexofenadine 180 MG tablet  Commonly known as: ALLEGRA   180 mg, Oral, Daily      fluticasone 50 MCG/ACT nasal spray  Commonly known as: FLONASE   INSTILL 2 SPRAYS INTO THE NOSTRIL(S) AS DIRECTED BY PROVIDER DAILY.      Hydrocortisone (Perianal) 2.5 % rectal cream  Commonly known as: ANUSOL-HC   Apply to hemorrhoids twice daily for 10 days      ibuprofen 800 MG tablet  Commonly known as: ADVIL,MOTRIN   800 mg, Oral, Every 4 Hours PRN      meclizine 25 MG tablet  Commonly known as: ANTIVERT   25 mg, Oral, 3 Times Daily PRN      metFORMIN  MG 24 hr tablet  Commonly known as: GLUCOPHAGE-XR   1,000 mg, Oral, Daily With Breakfast      montelukast 10 MG tablet  Commonly known as: SINGULAIR   10 mg, Oral, Every Night at Bedtime      Multi-Vitamin Daily tablet tablet  Generic drug: multivitamin   No dose, route, or frequency recorded.      ondansetron 4 MG tablet  Commonly known as: ZOFRAN   TAKE 1 TABLET BY MOUTH EVERY 8 HOURS AS NEEDED FOR NAUSEA AND VOMITING      phenol 1.4 % liquid liquid  Commonly known as: CHLORASEPTIC   2 sprays, Mouth/Throat, Every 2 Hours PRN      promethazine 25 MG tablet  Commonly known as: PHENERGAN   Take 1/2 to 1 tab po every 8 hours prn nausea/vomiting      Protonix 20 MG EC tablet  Generic drug: pantoprazole       SUMAtriptan 50 MG tablet  Commonly known as: IMITREX   TAKE ONE TABLET AT ONSET OF HEADACHE. MAY REPEAT DOSE ONE TIME IN 2 HOURS IF HEADACHE NOT RELIEVED.      Emeli 3-0.03 MG per tablet  Generic drug: drospirenone-ethinyl estradiol   TAKE 1 TABLET BY MOUTH EVERY DAY               Allergies   Allergen Reactions    Cantaloupe (Diagnostic) Swelling    Nuts Swelling     THROAT    Doxycycline Nausea And Vomiting    Egg-Derived Products Rash     Per allergy tx/ GI UPSET       Discharge Disposition:  Home or Self Care      Discharge Diet:  Diet Order   Procedures    Diet:  Cardiac; Healthy Heart (2-3 Na+); Fluid Consistency: Thin (IDDSI 0)       Discharge Activity:   Activity Instructions       Activity as Tolerated              CODE STATUS:    Code Status and Medical Interventions:   Ordered at: 04/21/24 1728     Code Status (Patient has no pulse and is not breathing):    CPR (Attempt to Resuscitate)     Medical Interventions (Patient has pulse or is breathing):    Full       Future Appointments   Date Time Provider Department Center   4/26/2024  3:15 PM Joanne Urbina APRN [facility] [place]   10/1/2024  9:15 AM Richelle Copeland MD [facility] [place]      Follow-up Information       Joanne Urbina APRN Follow up on 4/26/2024.    Specialties: Family Medicine, Urgent Care  Why: hospital follow up  4/26 at 315 pm   Please bring current medication list and arrive 15 min early  Contact information:  [address]    [place] [address] [phone]                             Time Spent on Discharge:  Greater than 30 minutes spent on discharge management including final examination, discussion of hospital stay and patient education, preparation of records, medication reconciliation, follow up planning      Devante Ramos MD  [facility]  04/23/24  08:17 EDT

## 2024-04-23 NOTE — NURSING NOTE
Discharge instructions reviewed with patient. Verbalized understanding. IV removed. Tele removed. Work note provided. Patient has called family for pickup and will notify staff when family here. All belongings have been gathered.

## 2024-04-24 ENCOUNTER — TRANSITIONAL CARE MANAGEMENT TELEPHONE ENCOUNTER (OUTPATIENT)
Dept: CALL CENTER | Facility: HOSPITAL | Age: 34
End: 2024-04-24
Payer: MEDICAID

## 2024-04-24 NOTE — OUTREACH NOTE
Call Center TCM Note      Flowsheet Row Responses   [facility] patient discharged from? [facility]   Does the patient have one of the following disease processes/diagnoses(primary or secondary)? Other   TCM attempt successful? Yes   Call start time 1232   Call end time 1235   Discharge diagnosis Pharyngitis   Meds reviewed with patient/caregiver? Yes   Is the patient having any side effects they believe may be caused by any medication additions or changes? No   Does the patient have all medications ordered at discharge? Yes   Prescription comments New rx's Amoxicillin, Fluconazole in place.   Is the patient taking all medications as directed (includes completed medication regime)? Yes   Comments TCM APPT with PCP NAGA Urbina is 04/26/2024.   Does the patient have an appointment with their PCP within 7-14 days of discharge? Yes   Has home health visited the patient within 72 hours of discharge? N/A   Psychosocial issues? No   Did the patient receive a copy of their discharge instructions? Yes   Nursing interventions Reviewed instructions with patient   What is the patient's perception of their health status since discharge? Improving   Is the patient/caregiver able to teach back signs and symptoms related to disease process for when to call PCP? Yes   Is the patient/caregiver able to teach back signs and symptoms related to disease process for when to call 911? Yes   Is the patient/caregiver able to teach back the hierarchy of who to call/visit for symptoms/problems? PCP, Specialist, Home health nurse, Urgent Care, ED, 911 Yes   If the patient is a current smoker, are they able to teach back resources for cessation? Not a smoker   TCM call completed? Yes   Wrap up additional comments D/C DX: pharyngitis,  strep - Pt still has sore throat, raspy voice but states pain is much better than before admit. Intermittent chills and sweats but no fever, as during admit. No questions at this time. TCM APPT with PCP  NAGA Urbina is 04/26/2024.   Call end time 1233            Alpa Godoy MA    4/24/2024, 12:37 EDT

## 2024-04-26 ENCOUNTER — OFFICE VISIT (OUTPATIENT)
Dept: FAMILY MEDICINE CLINIC | Facility: CLINIC | Age: 34
End: 2024-04-26
Payer: MEDICAID

## 2024-04-26 VITALS
RESPIRATION RATE: 18 BRPM | OXYGEN SATURATION: 99 % | DIASTOLIC BLOOD PRESSURE: 84 MMHG | WEIGHT: 293 LBS | SYSTOLIC BLOOD PRESSURE: 138 MMHG | BODY MASS INDEX: 45.99 KG/M2 | HEIGHT: 67 IN | HEART RATE: 94 BPM | TEMPERATURE: 96.8 F

## 2024-04-26 DIAGNOSIS — J02.0 STREP PHARYNGITIS: Primary | ICD-10-CM

## 2024-04-26 LAB
BACTERIA SPEC AEROBE CULT: NORMAL
BACTERIA SPEC AEROBE CULT: NORMAL

## 2024-04-26 NOTE — PROGRESS NOTES
Chief Complaint  Hospital Follow Up Visit (04/23/24)    Madina Moctezuma presents to [facility]  History of Present Illness  History of Present Illness  The patient presents for follow-up. Date of admission was 04/21/2024 and discharge was 04/23/2024. The patient presented to the ER with sore throat, ear pain, sinus pain and dizziness. In the ER, she was tested for strep and was positive. She was found to be stridorous on arrival. She was given steroids and antibiotics and transferred for admission. Upon discharge, she tolerated her oral diet well. No further stridor on exam. She increased her fluid intake and is here for follow-up today. She was discharged with amoxicillin for 10 full days of antibiotic. She is accompanied by an adult female.    Despite a week-long illness, her symptoms continued to worsen, initially attributing them to allergies or sinus issues. She has a history of tonsillectomy, but did not experience any blisters or fever. By Sunday, she experienced severe throat pain, ear pain, and headache, which rendered her unable to swallow and choking on liquids. Her mother contacted the on-call doctor and was advised to take her to the hospital. When she was less than half a mile from the hospital, she began experiencing difficulty breathing, suspecting another panic attack. She was immediately administered epinephrine and initiated on steroids and broad-spectrum antibiotics. Diagnostic tests were conducted, revealing a diagnosis of strep throat. Sepsis markers were detected, with a white blood cell count of 20 and lactic acid level of 2.7, which subsequently increased to 3 or slightly higher. She was admitted to [facility] the following day, where a CT scan of her throat revealed significant swelling in her epiglottis and difficulty breathing. Despite steroid treatment, her epiglottis remained swollen and breathing difficulties persisted. She was kept  "overnight and continues to take amoxicillin and Tylenol for pain management. She reports difficulty breathing when moving, but has managed to take a full shower without needing to rest. Her condition is gradually improving. She continues to experience severe ear pain, which was successfully flushed using a flush kit and her father. She reports sound sensitivity.    The patient is scheduled to start a new job on Monday and is inquiring about the possibility of taking over prescribing her Lexapro and Klonopin prescriptions. She infrequently takes Klonopin.    Objective   Vital Signs:  /84 (BP Location: Right arm, Patient Position: Sitting, Cuff Size: Adult)   Pulse 94   Temp 96.8 °F (36 °C) (Temporal)   Resp 18   Ht 170 cm (66.93\")   Wt (!) 172 kg (380 lb 3.2 oz)   SpO2 99%   BMI 59.67 kg/m²   Estimated body mass index is 59.67 kg/m² as calculated from the following:    Height as of this encounter: 170 cm (66.93\").    Weight as of this encounter: 172 kg (380 lb 3.2 oz).             Physical Exam  Vitals reviewed.   Constitutional:       Appearance: Normal appearance.   HENT:      Right Ear: Tympanic membrane and ear canal normal.      Left Ear: Tympanic membrane and ear canal normal.      Nose: Nose normal.      Mouth/Throat:      Mouth: Mucous membranes are moist.      Pharynx: Oropharynx is clear. Posterior oropharyngeal erythema present.   Cardiovascular:      Rate and Rhythm: Normal rate and regular rhythm.      Heart sounds: No murmur heard.     No friction rub. No gallop.   Pulmonary:      Effort: Pulmonary effort is normal. No respiratory distress.      Breath sounds: Normal breath sounds. No wheezing, rhonchi or rales.   Lymphadenopathy:      Cervical: No cervical adenopathy.   Skin:     General: Skin is warm and dry.   Neurological:      Mental Status: She is alert and oriented to person, place, and time.   Psychiatric:         Mood and Affect: Mood normal.       Physical Exam  Vital Signs  Blood " pressure is 138/84. Oxygen saturation is 99 percent. Heart rate is 94.    Result Review :          Results  Laboratory Studies  Strep test was positive. White blood cell count was 20. Lactic acid was 2.7.    Imaging  CT of throat showed significant swelling in the epiglottis.    Assessment & Plan  1. Streptococcal pharyngitis.  The patient's white blood cell count is decreasing, and her lactic acid levels are elevated. Her ears are not currently infected, however, there is evidence of mild fluid accumulation in one ear. The patient is advised to take Tylenol or ibuprofen as needed for pain management. She is instructed to take two antibiotics twice daily for a day, after which she can reduce the dosage to once daily.           Assessment and Plan     Diagnoses and all orders for this visit:    1. Strep pharyngitis (Primary)             Follow Up     No follow-ups on file.  Patient was given instructions and counseling regarding her condition or for health maintenance advice. Please see specific information pulled into the AVS if appropriate.       Patient or patient representative verbalized consent for the use of Ambient Listening during the visit with  NAGA Carrillo for chart documentation. 4/26/2024  16:17 EDT

## 2024-05-03 ENCOUNTER — HOSPITAL ENCOUNTER (EMERGENCY)
Facility: HOSPITAL | Age: 34
Discharge: HOME | End: 2024-05-03
Attending: STUDENT IN AN ORGANIZED HEALTH CARE EDUCATION/TRAINING PROGRAM
Payer: MEDICAID

## 2024-05-03 ENCOUNTER — APPOINTMENT (OUTPATIENT)
Dept: CT IMAGING | Facility: HOSPITAL | Age: 34
End: 2024-05-03
Payer: MEDICAID

## 2024-05-03 VITALS
WEIGHT: 293 LBS | BODY MASS INDEX: 41.95 KG/M2 | HEART RATE: 93 BPM | HEIGHT: 70 IN | RESPIRATION RATE: 18 BRPM | DIASTOLIC BLOOD PRESSURE: 69 MMHG | OXYGEN SATURATION: 95 % | SYSTOLIC BLOOD PRESSURE: 129 MMHG | TEMPERATURE: 98.8 F

## 2024-05-03 DIAGNOSIS — R11.2 NAUSEA VOMITING AND DIARRHEA: Primary | ICD-10-CM

## 2024-05-03 DIAGNOSIS — R19.7 NAUSEA VOMITING AND DIARRHEA: Primary | ICD-10-CM

## 2024-05-03 DIAGNOSIS — A04.72 CLOSTRIDIOIDES DIFFICILE DIARRHEA: ICD-10-CM

## 2024-05-03 DIAGNOSIS — F41.0 ANXIETY ATTACK: ICD-10-CM

## 2024-05-03 DIAGNOSIS — R10.84 GENERALIZED ABDOMINAL PAIN: ICD-10-CM

## 2024-05-03 LAB
ADV 40+41 DNA STL QL NAA+NON-PROBE: NOT DETECTED
ALBUMIN SERPL-MCNC: 3.7 G/DL (ref 3.5–5.2)
ALBUMIN/GLOB SERPL: 1.2 G/DL
ALP SERPL-CCNC: 75 U/L (ref 39–117)
ALT SERPL W P-5'-P-CCNC: 28 U/L (ref 1–33)
ANION GAP SERPL CALCULATED.3IONS-SCNC: 14 MMOL/L (ref 5–15)
AST SERPL-CCNC: 9 U/L (ref 1–32)
ASTRO TYP 1-8 RNA STL QL NAA+NON-PROBE: NOT DETECTED
B PARAPERT DNA SPEC QL NAA+PROBE: NOT DETECTED
B PERT DNA SPEC QL NAA+PROBE: NOT DETECTED
BASOPHILS # BLD AUTO: 0.07 10*3/MM3 (ref 0–0.2)
BASOPHILS NFR BLD AUTO: 0.8 % (ref 0–1.5)
BILIRUB SERPL-MCNC: 0.3 MG/DL (ref 0–1.2)
BILIRUB UR QL STRIP: NEGATIVE
BUN SERPL-MCNC: 7 MG/DL (ref 6–20)
BUN/CREAT SERPL: 8.1 (ref 7–25)
C CAYETANENSIS DNA STL QL NAA+NON-PROBE: NOT DETECTED
C COLI+JEJ+UPSA DNA STL QL NAA+NON-PROBE: NOT DETECTED
C DIFF GDH + TOXINS A+B STL QL IA.RAPID: NEGATIVE
C DIFF TOX GENS STL QL NAA+PROBE: POSITIVE
C PNEUM DNA NPH QL NAA+NON-PROBE: NOT DETECTED
CALCIUM SPEC-SCNC: 9.3 MG/DL (ref 8.6–10.5)
CHLORIDE SERPL-SCNC: 100 MMOL/L (ref 98–107)
CLARITY UR: CLEAR
CO2 SERPL-SCNC: 22 MMOL/L (ref 22–29)
COLOR UR: YELLOW
CREAT SERPL-MCNC: 0.86 MG/DL (ref 0.57–1)
CRYPTOSP DNA STL QL NAA+NON-PROBE: NOT DETECTED
DEPRECATED RDW RBC AUTO: 46.6 FL (ref 37–54)
E HISTOLYT DNA STL QL NAA+NON-PROBE: NOT DETECTED
EAEC PAA PLAS AGGR+AATA ST NAA+NON-PRB: NOT DETECTED
EC STX1+STX2 GENES STL QL NAA+NON-PROBE: NOT DETECTED
EGFRCR SERPLBLD CKD-EPI 2021: 90.5 ML/MIN/1.73
EOSINOPHIL # BLD AUTO: 0.17 10*3/MM3 (ref 0–0.4)
EOSINOPHIL NFR BLD AUTO: 1.8 % (ref 0.3–6.2)
EPEC EAE GENE STL QL NAA+NON-PROBE: NOT DETECTED
ERYTHROCYTE [DISTWIDTH] IN BLOOD BY AUTOMATED COUNT: 15.1 % (ref 12.3–15.4)
ETEC LTA+ST1A+ST1B TOX ST NAA+NON-PROBE: NOT DETECTED
FLUAV SUBTYP SPEC NAA+PROBE: NOT DETECTED
FLUBV RNA ISLT QL NAA+PROBE: NOT DETECTED
G LAMBLIA DNA STL QL NAA+NON-PROBE: NOT DETECTED
GLOBULIN UR ELPH-MCNC: 3.2 GM/DL
GLUCOSE SERPL-MCNC: 93 MG/DL (ref 65–99)
GLUCOSE UR STRIP-MCNC: NEGATIVE MG/DL
HADV DNA SPEC NAA+PROBE: NOT DETECTED
HCG SERPL QL: NEGATIVE
HCOV 229E RNA SPEC QL NAA+PROBE: NOT DETECTED
HCOV HKU1 RNA SPEC QL NAA+PROBE: NOT DETECTED
HCOV NL63 RNA SPEC QL NAA+PROBE: NOT DETECTED
HCOV OC43 RNA SPEC QL NAA+PROBE: NOT DETECTED
HCT VFR BLD AUTO: 39.9 % (ref 34–46.6)
HGB BLD-MCNC: 12.6 G/DL (ref 12–15.9)
HGB UR QL STRIP.AUTO: NEGATIVE
HMPV RNA NPH QL NAA+NON-PROBE: NOT DETECTED
HOLD SPECIMEN: NORMAL
HOLD SPECIMEN: NORMAL
HPIV1 RNA ISLT QL NAA+PROBE: NOT DETECTED
HPIV2 RNA SPEC QL NAA+PROBE: NOT DETECTED
HPIV3 RNA NPH QL NAA+PROBE: NOT DETECTED
HPIV4 P GENE NPH QL NAA+PROBE: NOT DETECTED
IMM GRANULOCYTES # BLD AUTO: 0.05 10*3/MM3 (ref 0–0.05)
IMM GRANULOCYTES NFR BLD AUTO: 0.5 % (ref 0–0.5)
KETONES UR QL STRIP: ABNORMAL
LEUKOCYTE ESTERASE UR QL STRIP.AUTO: NEGATIVE
LIPASE SERPL-CCNC: 15 U/L (ref 13–60)
LYMPHOCYTES # BLD AUTO: 2.33 10*3/MM3 (ref 0.7–3.1)
LYMPHOCYTES NFR BLD AUTO: 25 % (ref 19.6–45.3)
M PNEUMO IGG SER IA-ACNC: NOT DETECTED
MCH RBC QN AUTO: 26.9 PG (ref 26.6–33)
MCHC RBC AUTO-ENTMCNC: 31.6 G/DL (ref 31.5–35.7)
MCV RBC AUTO: 85.1 FL (ref 79–97)
MONOCYTES # BLD AUTO: 0.71 10*3/MM3 (ref 0.1–0.9)
MONOCYTES NFR BLD AUTO: 7.6 % (ref 5–12)
NEUTROPHILS NFR BLD AUTO: 5.99 10*3/MM3 (ref 1.7–7)
NEUTROPHILS NFR BLD AUTO: 64.3 % (ref 42.7–76)
NITRITE UR QL STRIP: NEGATIVE
NOROVIRUS GI+II RNA STL QL NAA+NON-PROBE: NOT DETECTED
NRBC BLD AUTO-RTO: 0 /100 WBC (ref 0–0.2)
P SHIGELLOIDES DNA STL QL NAA+NON-PROBE: NOT DETECTED
PH UR STRIP.AUTO: 6.5 [PH] (ref 5–8)
PLATELET # BLD AUTO: 356 10*3/MM3 (ref 140–450)
PMV BLD AUTO: 9.3 FL (ref 6–12)
POTASSIUM SERPL-SCNC: 4.2 MMOL/L (ref 3.5–5.2)
PROT SERPL-MCNC: 6.9 G/DL (ref 6–8.5)
PROT UR QL STRIP: ABNORMAL
RBC # BLD AUTO: 4.69 10*6/MM3 (ref 3.77–5.28)
RHINOVIRUS RNA SPEC NAA+PROBE: NOT DETECTED
RSV RNA NPH QL NAA+NON-PROBE: NOT DETECTED
RVA RNA STL QL NAA+NON-PROBE: NOT DETECTED
S ENT+BONG DNA STL QL NAA+NON-PROBE: NOT DETECTED
SAPO I+II+IV+V RNA STL QL NAA+NON-PROBE: NOT DETECTED
SARS-COV-2 RNA NPH QL NAA+NON-PROBE: NOT DETECTED
SHIGELLA SP+EIEC IPAH ST NAA+NON-PROBE: NOT DETECTED
SODIUM SERPL-SCNC: 136 MMOL/L (ref 136–145)
SP GR UR STRIP: 1.02 (ref 1–1.03)
UROBILINOGEN UR QL STRIP: ABNORMAL
V CHOL+PARA+VUL DNA STL QL NAA+NON-PROBE: NOT DETECTED
V CHOLERAE DNA STL QL NAA+NON-PROBE: NOT DETECTED
WBC NRBC COR # BLD AUTO: 9.32 10*3/MM3 (ref 3.4–10.8)
WHOLE BLOOD HOLD COAG: NORMAL
WHOLE BLOOD HOLD SPECIMEN: NORMAL
Y ENTEROCOL DNA STL QL NAA+NON-PROBE: NOT DETECTED

## 2024-05-03 PROCEDURE — 96376 TX/PRO/DX INJ SAME DRUG ADON: CPT

## 2024-05-03 PROCEDURE — 87493 C DIFF AMPLIFIED PROBE: CPT | Performed by: STUDENT IN AN ORGANIZED HEALTH CARE EDUCATION/TRAINING PROGRAM

## 2024-05-03 PROCEDURE — 80053 COMPREHEN METABOLIC PANEL: CPT

## 2024-05-03 PROCEDURE — 85025 COMPLETE CBC W/AUTO DIFF WBC: CPT

## 2024-05-03 PROCEDURE — 0202U NFCT DS 22 TRGT SARS-COV-2: CPT | Performed by: STUDENT IN AN ORGANIZED HEALTH CARE EDUCATION/TRAINING PROGRAM

## 2024-05-03 PROCEDURE — 25010000002 ONDANSETRON PER 1 MG: Performed by: STUDENT IN AN ORGANIZED HEALTH CARE EDUCATION/TRAINING PROGRAM

## 2024-05-03 PROCEDURE — 87449 NOS EACH ORGANISM AG IA: CPT | Performed by: STUDENT IN AN ORGANIZED HEALTH CARE EDUCATION/TRAINING PROGRAM

## 2024-05-03 PROCEDURE — 25810000003 SODIUM CHLORIDE 0.9 % SOLUTION: Performed by: STUDENT IN AN ORGANIZED HEALTH CARE EDUCATION/TRAINING PROGRAM

## 2024-05-03 PROCEDURE — 96375 TX/PRO/DX INJ NEW DRUG ADDON: CPT

## 2024-05-03 PROCEDURE — 25010000002 ONDANSETRON PER 1 MG: Performed by: PHYSICIAN ASSISTANT

## 2024-05-03 PROCEDURE — 25010000002 KETOROLAC TROMETHAMINE PER 15 MG: Performed by: PHYSICIAN ASSISTANT

## 2024-05-03 PROCEDURE — 87507 IADNA-DNA/RNA PROBE TQ 12-25: CPT | Performed by: STUDENT IN AN ORGANIZED HEALTH CARE EDUCATION/TRAINING PROGRAM

## 2024-05-03 PROCEDURE — 99284 EMERGENCY DEPT VISIT MOD MDM: CPT

## 2024-05-03 PROCEDURE — 83690 ASSAY OF LIPASE: CPT

## 2024-05-03 PROCEDURE — 74176 CT ABD & PELVIS W/O CONTRAST: CPT

## 2024-05-03 PROCEDURE — 81003 URINALYSIS AUTO W/O SCOPE: CPT

## 2024-05-03 PROCEDURE — 36415 COLL VENOUS BLD VENIPUNCTURE: CPT

## 2024-05-03 PROCEDURE — 84703 CHORIONIC GONADOTROPIN ASSAY: CPT

## 2024-05-03 PROCEDURE — 96374 THER/PROPH/DIAG INJ IV PUSH: CPT

## 2024-05-03 RX ORDER — ONDANSETRON 4 MG/1
8 TABLET, ORALLY DISINTEGRATING ORAL EVERY 8 HOURS PRN
Qty: 15 TABLET | Refills: 0 | Status: SHIPPED | OUTPATIENT
Start: 2024-05-03 | End: 2024-05-04

## 2024-05-03 RX ORDER — DICYCLOMINE HCL 20 MG
20 TABLET ORAL EVERY 6 HOURS PRN
Qty: 12 TABLET | Refills: 0 | Status: SHIPPED | OUTPATIENT
Start: 2024-05-03

## 2024-05-03 RX ORDER — CLONAZEPAM 0.5 MG/1
0.5 TABLET ORAL ONCE
Status: COMPLETED | OUTPATIENT
Start: 2024-05-03 | End: 2024-05-03

## 2024-05-03 RX ORDER — SODIUM CHLORIDE 0.9 % (FLUSH) 0.9 %
10 SYRINGE (ML) INJECTION AS NEEDED
Status: DISCONTINUED | OUTPATIENT
Start: 2024-05-03 | End: 2024-05-03 | Stop reason: HOSPADM

## 2024-05-03 RX ORDER — PANTOPRAZOLE SODIUM 40 MG/1
40 TABLET, DELAYED RELEASE ORAL DAILY
Qty: 15 TABLET | Refills: 0 | Status: SHIPPED | OUTPATIENT
Start: 2024-05-03 | End: 2024-05-04

## 2024-05-03 RX ORDER — VANCOMYCIN HYDROCHLORIDE 125 MG/1
125 CAPSULE ORAL 4 TIMES DAILY
Qty: 40 CAPSULE | Refills: 0 | Status: SHIPPED | OUTPATIENT
Start: 2024-05-03 | End: 2024-05-04

## 2024-05-03 RX ORDER — ONDANSETRON 2 MG/ML
4 INJECTION INTRAMUSCULAR; INTRAVENOUS ONCE
Status: COMPLETED | OUTPATIENT
Start: 2024-05-03 | End: 2024-05-03

## 2024-05-03 RX ORDER — KETOROLAC TROMETHAMINE 15 MG/ML
15 INJECTION, SOLUTION INTRAMUSCULAR; INTRAVENOUS ONCE
Status: COMPLETED | OUTPATIENT
Start: 2024-05-03 | End: 2024-05-03

## 2024-05-03 RX ADMIN — CLONAZEPAM 0.5 MG: 0.5 TABLET ORAL at 16:57

## 2024-05-03 RX ADMIN — SODIUM CHLORIDE 1000 ML: 9 INJECTION, SOLUTION INTRAVENOUS at 16:58

## 2024-05-03 RX ADMIN — ONDANSETRON 4 MG: 2 INJECTION INTRAMUSCULAR; INTRAVENOUS at 20:40

## 2024-05-03 RX ADMIN — ONDANSETRON 4 MG: 2 INJECTION INTRAMUSCULAR; INTRAVENOUS at 16:57

## 2024-05-03 RX ADMIN — KETOROLAC TROMETHAMINE 15 MG: 15 INJECTION, SOLUTION INTRAMUSCULAR; INTRAVENOUS at 20:40
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
## 2024-05-04 RX ORDER — ONDANSETRON 4 MG/1
8 TABLET, ORALLY DISINTEGRATING ORAL EVERY 8 HOURS PRN
Qty: 15 TABLET | Refills: 0 | Status: SHIPPED | OUTPATIENT
Start: 2024-05-04

## 2024-05-04 RX ORDER — PANTOPRAZOLE SODIUM 40 MG/1
40 TABLET, DELAYED RELEASE ORAL DAILY
Qty: 15 TABLET | Refills: 0 | Status: SHIPPED | OUTPATIENT
Start: 2024-05-04 | End: 2024-05-19

## 2024-05-04 RX ORDER — VANCOMYCIN HYDROCHLORIDE 125 MG/1
125 CAPSULE ORAL 4 TIMES DAILY
Qty: 40 CAPSULE | Refills: 0 | Status: SHIPPED | OUTPATIENT
Start: 2024-05-04 | End: 2024-05-14

## 2024-05-05 DIAGNOSIS — R19.7 DIARRHEA, UNSPECIFIED: ICD-10-CM

## 2024-05-06 RX ORDER — DICYCLOMINE HCL 20 MG
20 TABLET ORAL 3 TIMES DAILY
Qty: 270 TABLET | OUTPATIENT
Start: 2024-05-06

## 2024-05-06 NOTE — ED PROVIDER NOTES
MD ATTESTATION NOTE    The COREEN and I have discussed this patient's history, physical exam, and treatment plan.  I have reviewed the documentation and personally had a face to face interaction with the patient. I affirm the documentation and agree with the treatment and plan.  The attached note describes my personal findings.      I provided a substantive portion of the care of the patient.  I personally performed the physical exam in its entirety, and below are my findings.        Brief HPI: Patient presenting with nausea, vomiting, diarrhea.  Family members with similar symptoms.  No chest pain.    PHYSICAL EXAM  ED Triage Vitals   Temp Heart Rate Resp BP SpO2   05/03/24 1456 05/03/24 1456 05/03/24 1456 05/03/24 1505 05/03/24 1456   98.8 °F (37.1 °C) 106 18 98/65 99 %      Temp src Heart Rate Source Patient Position BP Location FiO2 (%)   -- -- -- -- --                GENERAL: no acute distress  HENT: nares patent  EYES: no scleral icterus  CV: regular rhythm, normal rate  RESPIRATORY: normal effort  ABDOMEN: soft  MUSCULOSKELETAL: no deformity  NEURO: alert, moves all extremities, follows commands  PSYCH:  calm, cooperative  SKIN: warm, dry    Vital signs and nursing notes reviewed.        Plan: Patient presented emergency department with nausea, vomiting, diarrhea.  Family numbers with similar symptoms.  Also recent treatment with antibiotics.  Labs and imaging reassuring, suspect antibiotic associated diarrhea versus viral syndrome.  Plan to discharge with supportive care and outpatient follow-up.    Of note after discharge C. difficile came back positive, sent vancomycin to her pharmacy.    ED Course as of 05/06/24 1625   Fri May 03, 2024   1556 WBC: 9.32 [KA]   1556 Hemoglobin: 12.6 [KA]   1556 Glucose: 93 [KA]   1556 Creatinine: 0.86 [KA]   1556 HCG Qualitative: Negative [KA]   1556 Lipase: 15 [KA]   1758 CT abdomen pelvis interpreted myself:  No acute inflammatory change [FS]   2006 I reassessed the patient.   Counseled her on all of her lab and imaging results.  CT abdomen unremarkable for any acute intra-abdominal findings.  Urinalysis and stool studies pending, she has given us a stool sample, will send out for testing.  Will check a urinalysis.  She is tolerating p.o., has not had any additional vomiting. [KA]   2049 Glucose: Negative [KA]   2049 Blood, UA: Negative [KA]   2049 Leukocytes, UA: Negative [KA]   2058 Urinalysis unremarkable.  Will discharge with Zofran and Bentyl and PPI, recommended close follow-up with PCP within 2 days.  She and mom at the bedside verbalized understanding are agreeable with this plan and she is ready for discharge.  Will call with any positive stool studies [KA]   2313 Clostridium difficile (toxin A/B)(!): Positive [KA]   2322 Patient c-diff positive after discharge. I called to notify her, her voicemail box is full, there was no answer.  Will released to [facility], I have sent vancomycin to her pharmacy. [KA]      ED Course User Index  [FS] Jimmy Vu MD  [KA] Mariana Lynn PA-C       SHARED VISIT: This visit was performed by BOTH a physician and an APC. The substantive portion of the medical decision making was performed by this attesting physician who made or approved the management plan and takes responsibility for patient management. All studies in the APC note (if performed) were independently interpreted by me.        Jimmy Vu MD  05/06/24 [id number]

## 2024-05-20 ENCOUNTER — TELEPHONE (OUTPATIENT)
Dept: FAMILY MEDICINE CLINIC | Facility: CLINIC | Age: 34
End: 2024-05-20

## 2024-05-20 NOTE — TELEPHONE ENCOUNTER
Caller: Jordana Spann    Relationship: Mother    Best call back number: [phone]     What is the best time to reach you: ANYTIME    Who are you requesting to speak with (clinical staff, provider,  specific staff member): CLINICAL    What was the call regarding: SHOULD PATIENT COME IN OR JUST GET ANOTHER ROUND OF THE ANTIBIOTIC FOR C-DIFF. SHE JUST FINISHED THE 1ST ROUND OF VANCOMYCIN 5 DAYS AGO.     PLEASE CALL TO SPEAK WITH MOTHER ON  [employer] REGARDING THIS ISSUE.

## 2024-05-26 DIAGNOSIS — E28.2 PCOS (POLYCYSTIC OVARIAN SYNDROME): ICD-10-CM

## 2024-05-26 DIAGNOSIS — N93.9 ABNORMAL UTERINE BLEEDING (AUB): ICD-10-CM

## 2024-05-26 DIAGNOSIS — L68.0 FEMALE HIRSUTISM: ICD-10-CM

## 2024-05-29 RX ORDER — DROSPIRENONE AND ETHINYL ESTRADIOL 0.03MG-3MG
1 KIT ORAL DAILY
Qty: 56 TABLET | Refills: 0 | OUTPATIENT
Start: 2024-05-29

## 2024-05-30 RX ORDER — DICYCLOMINE HCL 20 MG
20 TABLET ORAL 3 TIMES DAILY
Qty: 270 TABLET | OUTPATIENT
Start: 2024-05-30

## 2024-06-21 DIAGNOSIS — L68.0 FEMALE HIRSUTISM: ICD-10-CM

## 2024-06-21 DIAGNOSIS — E28.2 PCOS (POLYCYSTIC OVARIAN SYNDROME): ICD-10-CM

## 2024-06-21 DIAGNOSIS — N93.9 ABNORMAL UTERINE BLEEDING (AUB): ICD-10-CM

## 2024-06-21 RX ORDER — DROSPIRENONE AND ETHINYL ESTRADIOL 0.03MG-3MG
KIT ORAL
Qty: 84 TABLET | Refills: 1 | Status: SHIPPED | OUTPATIENT
Start: 2024-06-21

## 2024-07-15 DIAGNOSIS — J30.9 ALLERGIC RHINITIS, UNSPECIFIED SEASONALITY, UNSPECIFIED TRIGGER: ICD-10-CM

## 2024-07-16 RX ORDER — MONTELUKAST SODIUM 10 MG/1
10 TABLET ORAL
Qty: 90 TABLET | Refills: 0 | Status: SHIPPED | OUTPATIENT
Start: 2024-07-16

## 2024-07-16 RX ORDER — FEXOFENADINE HCL 180 MG/1
180 TABLET ORAL DAILY
Qty: 90 TABLET | Refills: 0 | Status: SHIPPED | OUTPATIENT
Start: 2024-07-16

## 2024-07-16 NOTE — TELEPHONE ENCOUNTER
Rx Refill Note  Requested Prescriptions     Pending Prescriptions Disp Refills    fexofenadine (ALLEGRA) 180 MG tablet 90 tablet 0     Sig: Take 1 tablet by mouth Daily.    montelukast (SINGULAIR) 10 MG tablet 90 tablet 0     Sig: Take 1 tablet by mouth every night at bedtime.      Last office visit with prescribing clinician: Visit date not found   Last telemedicine visit with prescribing clinician: Visit date not found   Next office visit with prescribing clinician: Visit date not found                         Would you like a call back once the refill request has been completed: [] Yes [] No    If the office needs to give you a call back, can they leave a voicemail: [] Yes [] No    Naida Black MA  07/16/24, 15:00 EDT

## 2024-08-30 ENCOUNTER — OFFICE VISIT (OUTPATIENT)
Dept: FAMILY MEDICINE CLINIC | Facility: CLINIC | Age: 34
End: 2024-08-30
Payer: COMMERCIAL

## 2024-08-30 VITALS
BODY MASS INDEX: 41.95 KG/M2 | HEART RATE: 102 BPM | OXYGEN SATURATION: 98 % | SYSTOLIC BLOOD PRESSURE: 148 MMHG | HEIGHT: 70 IN | WEIGHT: 293 LBS | DIASTOLIC BLOOD PRESSURE: 102 MMHG

## 2024-08-30 DIAGNOSIS — R53.83 FATIGUE, UNSPECIFIED TYPE: ICD-10-CM

## 2024-08-30 DIAGNOSIS — R23.2 HOT FLASHES: ICD-10-CM

## 2024-08-30 DIAGNOSIS — F41.9 ANXIETY: ICD-10-CM

## 2024-08-30 DIAGNOSIS — Z00.00 ANNUAL PHYSICAL EXAM: Primary | ICD-10-CM

## 2024-08-30 PROCEDURE — 99395 PREV VISIT EST AGE 18-39: CPT | Performed by: NURSE PRACTITIONER

## 2024-08-30 NOTE — PROGRESS NOTES
"Chief Complaint  Annual Exam    Subjective        Lanie Moctezuma presents to [facility]  History of Present Illness  History of Present Illness  The patient presents for evaluation of multiple medical concerns.    She has been experiencing significant fatigue recently, which she attributes to a possible infection. She is interested in having her A1c and vitamin D levels checked due to her family history and personal health concerns.    She reports experiencing hot flashes, which she suspects may be related to perimenopause. Her menstrual cycle is currently regulated with birth control and metformin for Polycystic Ovary Syndrome (PCOS).    She is considering reducing her Lexapro dosage from 20 mg to 10 mg, as she feels it is no longer effective.    She also reports a sensation of fluid in her ears, which she describes as similar to mucus or pus. She does not experience any pain when touching or pulling on her ear.    She believes her elevated blood pressure is due to anxiety.    She reports that Zofran triggers severe migraines for her.    She has discontinued the use of Wegovy due to severe depression and weight gain. She is currently taking Mounjaro and Ozempic, which cause her abdominal pain. She is also on metformin, two tablets in the morning, which she reports is effective for her PCOS.    She has discontinued the use of Dificid.    Objective   Vital Signs:  BP (!) 148/102 (BP Location: Right arm, Patient Position: Sitting, Cuff Size: Large Adult)   Pulse 102   Ht 177.8 cm (70\")   Wt (!) 178 kg (391 lb 9.6 oz)   SpO2 98%   BMI 56.19 kg/m²   Estimated body mass index is 56.19 kg/m² as calculated from the following:    Height as of this encounter: 177.8 cm (70\").    Weight as of this encounter: 178 kg (391 lb 9.6 oz).             Physical Exam  Vitals reviewed.   Constitutional:       General: She is not in acute distress.     Appearance: She is well-developed. She is not " diaphoretic.   HENT:      Head: Normocephalic and atraumatic.      Right Ear: Tympanic membrane, ear canal and external ear normal.      Left Ear: Tympanic membrane, ear canal and external ear normal.      Nose: Nose normal.      Mouth/Throat:      Mouth: Mucous membranes are moist.      Pharynx: Oropharynx is clear. Uvula midline. No oropharyngeal exudate.   Eyes:      Conjunctiva/sclera: Conjunctivae normal.      Pupils: Pupils are equal, round, and reactive to light.   Cardiovascular:      Rate and Rhythm: Normal rate and regular rhythm.      Heart sounds: Normal heart sounds. No murmur heard.     No friction rub. No gallop.   Pulmonary:      Effort: Pulmonary effort is normal. No respiratory distress.      Breath sounds: Normal breath sounds. No wheezing or rales.   Abdominal:      General: Bowel sounds are normal. There is no distension.      Palpations: Abdomen is soft.      Tenderness: There is no abdominal tenderness.   Musculoskeletal:      Cervical back: Neck supple.   Lymphadenopathy:      Cervical: No cervical adenopathy.   Skin:     General: Skin is warm and dry.   Neurological:      Mental Status: She is alert and oriented to person, place, and time.   Psychiatric:         Mood and Affect: Mood normal.       Physical Exam      Result Review :          Results      Assessment & Plan  1. Anxiety.  She reports increased anxiety, which may be contributing to her elevated blood pressure. She is advised to monitor her blood pressure at home to ensure it remains within the normal range. She has a blood pressure cuff at home and will check it regularly. She will taper off lexapro as it is no longer effective and will consider cymbalta in the future    2. Fatigue.  She reports feeling very fatigued lately. This could be related to a possible recent mild stomach bug or anxiety. Blood work, including A1c and vitamin D levels, will be checked to rule out any underlying conditions contributing to her fatigue.    3.  Perimenopause.  She experiences what feels like hot flashes, which could be related to perimenopause. An FSH (follicle-stimulating hormone) test will be conducted to check for menopause.    4. Medication Management.  She wishes to start tapering off Lexapro as she feels it is no longer effective. She will reduce her dose from 20 mg to 10 mg by breaking the tablet in half for about a week or two. If she feels okay, she will take it every other day for about a week and then stop. She has enough medication at home to follow this tapering schedule.                   Assessment and Plan     Diagnoses and all orders for this visit:    1. Annual physical exam (Primary)  -     CBC & Differential  -     Comprehensive Metabolic Panel  -     Lipid Panel With LDL / HDL Ratio  -     TSH Rfx On Abnormal To Free T4  -     Hemoglobin A1c    2. Fatigue, unspecified type  -     Vitamin D 25 hydroxy  -     Vitamin B12    3. Hot flashes  -     FSH & LH    4. Anxiety             Follow Up     No follow-ups on file.  Patient was given instructions and counseling regarding her condition or for health maintenance advice. Please see specific information pulled into the AVS if appropriate.       Patient or patient representative verbalized consent for the use of Ambient Listening during the visit with  NAGA Carrillo for chart documentation. 8/30/2024  16:41 EDT

## 2024-09-08 DIAGNOSIS — E28.2 PCOS (POLYCYSTIC OVARIAN SYNDROME): ICD-10-CM

## 2024-09-08 DIAGNOSIS — E66.01 MORBID OBESITY WITH BMI OF 50.0-59.9, ADULT: ICD-10-CM

## 2024-09-09 ENCOUNTER — OFFICE VISIT (OUTPATIENT)
Dept: OBSTETRICS AND GYNECOLOGY | Age: 34
End: 2024-09-09
Payer: COMMERCIAL

## 2024-09-09 ENCOUNTER — LAB (OUTPATIENT)
Dept: LAB | Facility: HOSPITAL | Age: 34
End: 2024-09-09
Payer: COMMERCIAL

## 2024-09-09 VITALS
SYSTOLIC BLOOD PRESSURE: 126 MMHG | DIASTOLIC BLOOD PRESSURE: 80 MMHG | HEIGHT: 70 IN | WEIGHT: 293 LBS | BODY MASS INDEX: 41.95 KG/M2

## 2024-09-09 DIAGNOSIS — Z12.4 SCREENING FOR MALIGNANT NEOPLASM OF CERVIX: ICD-10-CM

## 2024-09-09 DIAGNOSIS — Z01.419 WELL FEMALE EXAM WITH ROUTINE GYNECOLOGICAL EXAM: Primary | ICD-10-CM

## 2024-09-09 DIAGNOSIS — Z11.51 SCREENING FOR HUMAN PAPILLOMAVIRUS (HPV): ICD-10-CM

## 2024-09-09 DIAGNOSIS — E28.2 PCOS (POLYCYSTIC OVARIAN SYNDROME): ICD-10-CM

## 2024-09-09 LAB
25(OH)D3 SERPL-MCNC: 74.5 NG/ML (ref 30–100)
ALBUMIN SERPL-MCNC: 3.6 G/DL (ref 3.5–5.2)
ALBUMIN/GLOB SERPL: 1 G/DL
ALP SERPL-CCNC: 95 U/L (ref 39–117)
ALT SERPL W P-5'-P-CCNC: 23 U/L (ref 1–33)
ANION GAP SERPL CALCULATED.3IONS-SCNC: 10.4 MMOL/L (ref 5–15)
AST SERPL-CCNC: 9 U/L (ref 1–32)
BASOPHILS # BLD AUTO: 0.08 10*3/MM3 (ref 0–0.2)
BASOPHILS NFR BLD AUTO: 0.9 % (ref 0–1.5)
BILIRUB SERPL-MCNC: <0.2 MG/DL (ref 0–1.2)
BUN SERPL-MCNC: 14 MG/DL (ref 6–20)
BUN/CREAT SERPL: 16.7 (ref 7–25)
CALCIUM SPEC-SCNC: 9.2 MG/DL (ref 8.6–10.5)
CHLORIDE SERPL-SCNC: 103 MMOL/L (ref 98–107)
CHOLEST SERPL-MCNC: 225 MG/DL (ref 0–200)
CO2 SERPL-SCNC: 23.6 MMOL/L (ref 22–29)
CREAT SERPL-MCNC: 0.84 MG/DL (ref 0.57–1)
DEPRECATED RDW RBC AUTO: 50.8 FL (ref 37–54)
EGFRCR SERPLBLD CKD-EPI 2021: 93.1 ML/MIN/1.73
EOSINOPHIL # BLD AUTO: 0.24 10*3/MM3 (ref 0–0.4)
EOSINOPHIL NFR BLD AUTO: 2.7 % (ref 0.3–6.2)
ERYTHROCYTE [DISTWIDTH] IN BLOOD BY AUTOMATED COUNT: 16.4 % (ref 12.3–15.4)
FSH SERPL-ACNC: 4.15 MIU/ML
GLOBULIN UR ELPH-MCNC: 3.5 GM/DL
GLUCOSE SERPL-MCNC: 97 MG/DL (ref 65–99)
HBA1C MFR BLD: 6.1 % (ref 4.8–5.6)
HCT VFR BLD AUTO: 41.8 % (ref 34–46.6)
HDLC SERPL-MCNC: 61 MG/DL (ref 40–60)
HGB BLD-MCNC: 13 G/DL (ref 12–15.9)
IMM GRANULOCYTES # BLD AUTO: 0.05 10*3/MM3 (ref 0–0.05)
IMM GRANULOCYTES NFR BLD AUTO: 0.6 % (ref 0–0.5)
LDLC SERPL CALC-MCNC: 95 MG/DL (ref 0–100)
LDLC/HDLC SERPL: 1.31 {RATIO}
LH SERPL-ACNC: 2.81 MIU/ML
LYMPHOCYTES # BLD AUTO: 1.97 10*3/MM3 (ref 0.7–3.1)
LYMPHOCYTES NFR BLD AUTO: 22.2 % (ref 19.6–45.3)
MCH RBC QN AUTO: 26.3 PG (ref 26.6–33)
MCHC RBC AUTO-ENTMCNC: 31.1 G/DL (ref 31.5–35.7)
MCV RBC AUTO: 84.6 FL (ref 79–97)
MONOCYTES # BLD AUTO: 0.8 10*3/MM3 (ref 0.1–0.9)
MONOCYTES NFR BLD AUTO: 9 % (ref 5–12)
NEUTROPHILS NFR BLD AUTO: 5.75 10*3/MM3 (ref 1.7–7)
NEUTROPHILS NFR BLD AUTO: 64.6 % (ref 42.7–76)
NRBC BLD AUTO-RTO: 0 /100 WBC (ref 0–0.2)
PLATELET # BLD AUTO: 345 10*3/MM3 (ref 140–450)
PMV BLD AUTO: 9.3 FL (ref 6–12)
POTASSIUM SERPL-SCNC: 3.9 MMOL/L (ref 3.5–5.2)
PROT SERPL-MCNC: 7.1 G/DL (ref 6–8.5)
RBC # BLD AUTO: 4.94 10*6/MM3 (ref 3.77–5.28)
SODIUM SERPL-SCNC: 137 MMOL/L (ref 136–145)
TRIGL SERPL-MCNC: 420 MG/DL (ref 0–150)
TSH SERPL DL<=0.05 MIU/L-ACNC: 0.74 UIU/ML (ref 0.27–4.2)
VIT B12 BLD-MCNC: 527 PG/ML (ref 211–946)
VLDLC SERPL-MCNC: 69 MG/DL (ref 5–40)
WBC NRBC COR # BLD AUTO: 8.89 10*3/MM3 (ref 3.4–10.8)

## 2024-09-09 PROCEDURE — 82607 VITAMIN B-12: CPT | Performed by: NURSE PRACTITIONER

## 2024-09-09 PROCEDURE — 80050 GENERAL HEALTH PANEL: CPT | Performed by: NURSE PRACTITIONER

## 2024-09-09 PROCEDURE — 80061 LIPID PANEL: CPT | Performed by: NURSE PRACTITIONER

## 2024-09-09 PROCEDURE — 99395 PREV VISIT EST AGE 18-39: CPT

## 2024-09-09 PROCEDURE — 83036 HEMOGLOBIN GLYCOSYLATED A1C: CPT | Performed by: NURSE PRACTITIONER

## 2024-09-09 PROCEDURE — 82306 VITAMIN D 25 HYDROXY: CPT | Performed by: NURSE PRACTITIONER

## 2024-09-09 PROCEDURE — 83001 ASSAY OF GONADOTROPIN (FSH): CPT | Performed by: NURSE PRACTITIONER

## 2024-09-09 PROCEDURE — 83002 ASSAY OF GONADOTROPIN (LH): CPT | Performed by: NURSE PRACTITIONER

## 2024-09-09 NOTE — PROGRESS NOTES
Subjective     Chief Complaint   Patient presents with    Gynecologic Exam     CC: annual. Last pap 21 neg hpv neg. Last colonoscopy 22.       History of Present Illness    Lanie Moctezuma is a 34 y.o.  who presents for annual exam.  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         Current contraception: OCP (estrogen/progesterone)  History of abnormal Pap smear: no  Received Gardasil immunization: no  Perform regular self breast exam: yes - occ  Family history of uterine or ovarian cancer: no  Family History of colon cancer: no  Family history of breast cancer: no    Mammogram: not indicated.  Colonoscopy: up to date.  DEXA: not indicated.    Exercise: not active  Calcium/Vitamin D: adequate intake and uses supplements    The following portions of the patient's history were reviewed and updated as appropriate: allergies, current medications, past family history, past medical history, past social history, past surgical history, and problem list.    Review of Systems   Constitutional: Negative.    HENT: Negative.     Eyes: Negative.    Respiratory: Negative.     Cardiovascular: Negative.    Gastrointestinal: Negative.    Endocrine: Negative.    Genitourinary: Negative.    Musculoskeletal: Negative.    Skin: Negative.    Allergic/Immunologic: Negative.    Neurological: Negative.    Hematological: Negative.    Psychiatric/Behavioral: Negative.             Objective   Physical Exam  Vitals and nursing note reviewed.   Constitutional:       " Appearance: Normal appearance. She is obese.   HENT:      Head: Normocephalic.   Eyes:      Pupils: Pupils are equal, round, and reactive to light.   Cardiovascular:      Rate and Rhythm: Normal rate and regular rhythm.      Pulses: Normal pulses.      Heart sounds: Normal heart sounds.   Pulmonary:      Effort: Pulmonary effort is normal.      Breath sounds: Normal breath sounds.   Chest:   Breasts:     Right: Normal.      Left: Normal.   Abdominal:      General: Abdomen is flat. Bowel sounds are normal.      Palpations: Abdomen is soft.   Genitourinary:     General: Normal vulva.      Exam position: Lithotomy position.      Vagina: Normal.      Cervix: Normal.      Uterus: Normal.       Adnexa: Right adnexa normal and left adnexa normal.      Rectum: Normal.   Musculoskeletal:         General: Normal range of motion.      Cervical back: Full passive range of motion without pain and normal range of motion.      Right lower leg: No edema.      Left lower leg: No edema.   Lymphadenopathy:      Head:      Right side of head: No submental or submandibular adenopathy.      Left side of head: No submental or submandibular adenopathy.   Skin:     General: Skin is warm and dry.   Neurological:      General: No focal deficit present.      Mental Status: She is alert.      Gait: Gait is intact.   Psychiatric:         Attention and Perception: Attention normal.         Mood and Affect: Mood normal.         Speech: Speech normal.         /80   Ht 177 cm (69.69\")   Wt (!) 175 kg (385 lb)   LMP 08/09/2024 (Approximate)   BMI 55.74 kg/m²     Assessment & Plan   Diagnoses and all orders for this visit:    1. Well female exam with routine gynecological exam (Primary)  -     IGP, Apt HPV,rfx 16 / 18,45    2. Screening for human papillomavirus (HPV)  -     IGP, Apt HPV,rfx 16 / 18,45    3. Screening for malignant neoplasm of cervix  -     IGP, Apt HPV,rfx 16 / 18,45    4. PCOS (polycystic ovarian syndrome)      Pap today " guidelines reviewed   All questions answered and addressed  Breast self exam technique reviewed and patient encouraged to perform self-exam monthly.  Discussed healthy lifestyle modifications.  Recommended 30 minutes of aerobic exercise five times per week.  Encouraged multivitamin use with vit d  Call the office if you have not received results from today's visit within 2 weeks               Richelle Bahena, NAGA  9/9/2024, 14:15 EDT

## 2024-09-11 LAB
CYTOLOGIST CVX/VAG CYTO: NORMAL
CYTOLOGY CVX/VAG DOC CYTO: NORMAL
CYTOLOGY CVX/VAG DOC THIN PREP: NORMAL
DX ICD CODE: NORMAL
HPV I/H RISK 4 DNA CVX QL PROBE+SIG AMP: NEGATIVE
Lab: NORMAL
OTHER STN SPEC: NORMAL
STAT OF ADQ CVX/VAG CYTO-IMP: NORMAL

## 2024-09-11 RX ORDER — METFORMIN HCL 500 MG
1000 TABLET, EXTENDED RELEASE 24 HR ORAL
Qty: 180 TABLET | Refills: 3 | Status: SHIPPED | OUTPATIENT
Start: 2024-09-11 | End: 2025-09-11

## 2024-10-09 ENCOUNTER — TELEPHONE (OUTPATIENT)
Dept: OBSTETRICS AND GYNECOLOGY | Age: 34
End: 2024-10-09
Payer: COMMERCIAL

## 2024-10-09 DIAGNOSIS — F32.81 PMDD (PREMENSTRUAL DYSPHORIC DISORDER): Primary | ICD-10-CM

## 2024-10-09 DIAGNOSIS — J30.9 ALLERGIC RHINITIS, UNSPECIFIED SEASONALITY, UNSPECIFIED TRIGGER: ICD-10-CM

## 2024-10-09 RX ORDER — FEXOFENADINE HCL 180 MG/1
180 TABLET ORAL DAILY
Qty: 30 TABLET | Refills: 2 | Status: SHIPPED | OUTPATIENT
Start: 2024-10-09

## 2024-10-09 NOTE — TELEPHONE ENCOUNTER
----- Message from Amita AVILA sent at 10/8/2024  3:20 PM EDT -----  Regarding: FW: PMDD  Contact: [phone]  Please see pt message.  ----- Message -----  From: Lanie Moctezuma  Sent: 10/8/2024   3:12 PM EDT  To: [facility]  Subject: PMDD                                             Dr. Copeland,  I was wondering if it's possible for me to have PMDD as well as PCOS? The reason I ask is because I feel I've been exhibiting signs of PMDD with increasing regularity over the last several months and the depression and anxiety just before starting my cycle has been getting to a point of almost debilitating. If it is possible to have both, how would it be determined medically and what would the course of treatment be in that case? Any insight you can offer would be greatly appreciated.

## 2024-10-09 NOTE — TELEPHONE ENCOUNTER
Yes, please tell patient those are very different diagnoses.  If she is still taking the birth control pill?  We often use that to treat PMDD but we can also use an SSRI.  Please let me know if she is interested in an SSRI.  You can also make an appointment for her for us to discuss.

## 2024-10-10 NOTE — TELEPHONE ENCOUNTER
Patient would like to try SSRI , she also wants to have an appointment with you any Monday in November . What day can I work her in ?   She was previously on Viibryd , Effexor and lexapro - did not like any of those meds .

## 2024-10-11 NOTE — TELEPHONE ENCOUNTER
Please let her know I am sending her in the best one for PMDD.  Please take it daily until I see her in November.

## 2024-10-19 DIAGNOSIS — J30.9 ALLERGIC RHINITIS, UNSPECIFIED SEASONALITY, UNSPECIFIED TRIGGER: ICD-10-CM

## 2024-10-21 RX ORDER — MONTELUKAST SODIUM 10 MG/1
10 TABLET ORAL
Qty: 90 TABLET | Refills: 0 | Status: SHIPPED | OUTPATIENT
Start: 2024-10-21

## 2024-11-08 ENCOUNTER — OFFICE VISIT (OUTPATIENT)
Dept: FAMILY MEDICINE CLINIC | Facility: CLINIC | Age: 34
End: 2024-11-08
Payer: COMMERCIAL

## 2024-11-08 VITALS
WEIGHT: 293 LBS | BODY MASS INDEX: 41.95 KG/M2 | DIASTOLIC BLOOD PRESSURE: 102 MMHG | SYSTOLIC BLOOD PRESSURE: 136 MMHG | OXYGEN SATURATION: 96 % | HEIGHT: 70 IN | HEART RATE: 115 BPM

## 2024-11-08 DIAGNOSIS — R60.9 DEPENDENT EDEMA: ICD-10-CM

## 2024-11-08 DIAGNOSIS — R03.0 ELEVATED BLOOD PRESSURE READING: ICD-10-CM

## 2024-11-08 DIAGNOSIS — F41.9 ANXIETY: Primary | ICD-10-CM

## 2024-11-08 PROCEDURE — 99213 OFFICE O/P EST LOW 20 MIN: CPT | Performed by: NURSE PRACTITIONER

## 2024-11-08 RX ORDER — ESCITALOPRAM OXALATE 10 MG/1
1 TABLET ORAL DAILY
COMMUNITY
Start: 2024-10-14

## 2024-11-08 NOTE — PROGRESS NOTES
"Chief Complaint  Leg Swelling    Subjective        Lanie Moctezuma presents to [facility]  Leg Swelling      History of Present Illness  The patient presents for evaluation of multiple medical concerns.    She is experiencing significant discomfort due to anxiety, which is exacerbated by her current menstrual cycle. Her anxiety levels have been particularly high recently, as evidenced by an episode of crying at her desk due to perceived underperformance at work. 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 Despite these challenges, she has found some relief in her hobbies of betty and painting.    She has a history of swelling, which she previously managed with a diuretic medication. However, she did not notice a significant improvement with this treatment. The swelling tends to occur when she remains seated for extended periods. To manage this, she drinks dandelion tea, a natural diuretic, and tries to keep her feet elevated or move around more frequently.    Objective   Vital Signs:  BP (!) 136/102 (BP Location: Left arm, Patient Position: Sitting, Cuff Size: Large Adult)   Pulse 115   Ht 177 cm (69.69\")   Wt (!) 177 kg (390 lb 8 oz)   SpO2 96%   BMI 56.53 kg/m²   Estimated body mass index is 56.53 kg/m² as calculated from the following:    Height as of this encounter: 177 cm (69.69\").    Weight as of this encounter: 177 kg (390 lb 8 oz).             Physical Exam  Constitutional:       General: She is not in acute distress.     Appearance: She is well-developed. She is not diaphoretic.   Cardiovascular:      Rate and Rhythm: Normal rate and regular rhythm.      Heart sounds: Normal heart sounds. No murmur heard.     No friction rub. No gallop.   Pulmonary:      Effort: Pulmonary effort is normal. No respiratory distress.      Breath sounds: Normal breath sounds. No wheezing or rales.   Musculoskeletal:     "  Cervical back: Neck supple.   Skin:     General: Skin is warm and dry.   Neurological:      Mental Status: She is alert and oriented to person, place, and time.       Physical Exam  Vital Signs  Vitals show a heart rate of 115.    Result Review :          Results      Assessment & Plan  1. Anxiety.  Elevated blood pressure was noted during today's visit, likely related to anxiety. She was advised to monitor her blood pressure at home during periods of calmness. A reduction in overtime work was recommended, along with the provision of a few days off per month for FMLA, if necessary. She was also advised to get up and move around during breaks to help manage anxiety.    2. Dependent edema.  The use of compression socks was suggested to alleviate swelling. She was encouraged to take breaks from sitting and move around during her work hours. Drinking dandelion tea, a natural diuretic, was also mentioned as a helpful measure.               Assessment and Plan     Diagnoses and all orders for this visit:    1. Anxiety (Primary)    2. Dependent edema    3. Elevated blood pressure reading             Follow Up     No follow-ups on file.  Patient was given instructions and counseling regarding her condition or for health maintenance advice. Please see specific information pulled into the AVS if appropriate.       Patient or patient representative verbalized consent for the use of Ambient Listening during the visit with  NAGA Carrillo for chart documentation. 11/8/2024  17:00 EST

## 2024-11-18 ENCOUNTER — OFFICE VISIT (OUTPATIENT)
Dept: OBSTETRICS AND GYNECOLOGY | Age: 34
End: 2024-11-18
Payer: COMMERCIAL

## 2024-11-18 VITALS
DIASTOLIC BLOOD PRESSURE: 84 MMHG | SYSTOLIC BLOOD PRESSURE: 134 MMHG | WEIGHT: 293 LBS | BODY MASS INDEX: 41.95 KG/M2 | HEIGHT: 70 IN

## 2024-11-18 DIAGNOSIS — F32.81 PMDD (PREMENSTRUAL DYSPHORIC DISORDER): Primary | ICD-10-CM

## 2024-11-18 PROCEDURE — 99213 OFFICE O/P EST LOW 20 MIN: CPT | Performed by: OBSTETRICS & GYNECOLOGY

## 2024-11-18 NOTE — PROGRESS NOTES
"Subjective   Lanie Moctezuma is a 34 y.o. female presents to f/u  on medication for PMDD, her psychiatrist prescribed her lexapro and pt feels like its helping with some of her depression but not as much as she would like- specifically before starting menses .  Advised patient that I would continue the Lexapro but likely increase it to 20 mg daily.  Handout given on PMDD and other ways to manage it.  I last saw last year for her annual visit.   Prefers to see me in [place] office next time as she lives in [place]. She spends a lot of time with her two nieces (ages 2 and 5) and is really into crocheting right now.      History of Present Illness    The following portions of the patient's history were reviewed and updated as appropriate: allergies, current medications, past family history, past medical history, past social history, past surgical history, and problem list.    Review of Systems   Constitutional:  Negative for chills, fatigue and fever.   Gastrointestinal:  Negative for abdominal pain.   Genitourinary:  Negative for vaginal bleeding, vaginal discharge and vaginal pain.   Psychiatric/Behavioral:  Positive for dysphoric mood.    All other systems reviewed and are negative.  /84   Ht 177 cm (69.69\")   Wt (!) 179 kg (394 lb)   LMP 11/07/2024 (Exact Date)   BMI 57.04 kg/m²       Objective   Physical Exam  Vitals and nursing note reviewed.   Constitutional:       Appearance: Normal appearance. She is obese.   Pulmonary:      Effort: Pulmonary effort is normal.   Neurological:      Mental Status: She is alert and oriented to person, place, and time.   Psychiatric:         Mood and Affect: Mood normal.         Behavior: Behavior normal.         Assessment & Plan   Diagnoses and all orders for this visit:    1. PMDD (premenstrual dysphoric disorder) (Primary)       Counseling was given to patient for the following topics: instructions for management, prognosis, impressions, risks and benefits of " treatment options, and importance of treatment compliance . Total time of the encounter was 20 minutes and 15 minutes was spent counseling.      Return in about 6 months (around 5/18/2025) for Annual physical in [facility] and to follow-up on PMDD.

## 2024-11-26 RX ORDER — SUMATRIPTAN 50 MG/1
TABLET, FILM COATED ORAL
Qty: 9 TABLET | Refills: 6 | Status: SHIPPED | OUTPATIENT
Start: 2024-11-26

## 2024-11-26 NOTE — TELEPHONE ENCOUNTER
Rx Refill Note  Requested Prescriptions     Pending Prescriptions Disp Refills    SUMAtriptan (IMITREX) 50 MG tablet 9 tablet 6     Sig: Take one tablet at onset of headache. May repeat dose one time in 2 hours if headache not relieved.      Last office visit with prescribing clinician: 11/8/2024   Last telemedicine visit with prescribing clinician: Visit date not found   Next office visit with prescribing clinician: Visit date not found                         Would you like a call back once the refill request has been completed: [] Yes [] No    If the office needs to give you a call back, can they leave a voicemail: [] Yes [] No    Tanner Deal MA  11/26/24, 07:43 EST

## 2024-12-11 ENCOUNTER — TELEPHONE (OUTPATIENT)
Dept: OBSTETRICS AND GYNECOLOGY | Age: 34
End: 2024-12-11
Payer: COMMERCIAL

## 2024-12-11 NOTE — TELEPHONE ENCOUNTER
,  You want this patient to have a consult in January for 30 min . She will come to the [place] office . You rather have her see you 1/21/25 @ 330 your last appointment or 1/14/25 @ 130 but is only 15 min spot . Please let me know ? I spoke with her she is open to whatever is good for you . Thank you .

## 2024-12-20 ENCOUNTER — TELEPHONE (OUTPATIENT)
Dept: FAMILY MEDICINE CLINIC | Facility: CLINIC | Age: 34
End: 2024-12-20

## 2025-01-11 DIAGNOSIS — J30.9 ALLERGIC RHINITIS, UNSPECIFIED SEASONALITY, UNSPECIFIED TRIGGER: ICD-10-CM

## 2025-01-13 NOTE — TELEPHONE ENCOUNTER
Rx Refill Note  Requested Prescriptions     Pending Prescriptions Disp Refills    fexofenadine (ALLEGRA) 180 MG tablet [Pharmacy Med Name: FEXOFENADINE  MG TABLET] 30 tablet 2     Sig: TAKE 1 TABLET BY MOUTH EVERY DAY      Last office visit with prescribing clinician: 11/8/2024   Last telemedicine visit with prescribing clinician: Visit date not found   Next office visit with prescribing clinician: Visit date not found                         Would you like a call back once the refill request has been completed: [] Yes [] No    If the office needs to give you a call back, can they leave a voicemail: [] Yes [] No    Tanner Deal MA  01/13/25, 08:46 EST

## 2025-01-14 RX ORDER — FEXOFENADINE HCL 180 MG/1
180 TABLET ORAL DAILY
Qty: 30 TABLET | Refills: 2 | Status: SHIPPED | OUTPATIENT
Start: 2025-01-14

## 2025-01-16 DIAGNOSIS — J30.9 ALLERGIC RHINITIS, UNSPECIFIED SEASONALITY, UNSPECIFIED TRIGGER: ICD-10-CM

## 2025-01-16 RX ORDER — MONTELUKAST SODIUM 10 MG/1
10 TABLET ORAL
Qty: 90 TABLET | Refills: 0 | Status: SHIPPED | OUTPATIENT
Start: 2025-01-16

## 2025-01-19 NOTE — PROGRESS NOTES
"Chief Complaint  Sinus Problem (Pt has had cough, drainage, sore throat, runny nose, sinus pressure ,extreme vertigo, nausea and laryngitis at first of the day that wears off. Left ear has some wax in it )    Subjective        Lanie Moctezuma presents to [facility]  History of Present Illness  Pleasant patient here today with her mother complains of increased anxiety feelings of panic, sinus drainage congestion started Sunday, sore throat throat hurts really bad did, looking better inside of they have a picture with ulceration several days ago,  Congestion cough to be mostly sinus congestion and sore throat, no chest pain or increased shortness of breath lethargy weakness, no recent fever improving except for the sore throat    Has had vertigo in the past, takes Benadryl daily, chronic anxiety and allergies,  Mother and patient both report this is not abnormal for her to feel panicky no confusion slurred speech weakness or focal paresthesia  Sinus Problem    Objective   Vital Signs:  /88   Pulse 102   Temp 97.3 °F (36.3 °C)   Resp 20   Ht 170 cm (66.93\")   Wt (!) 166 kg (365 lb)   SpO2 98%   BMI 57.29 kg/m²   Estimated body mass index is 57.29 kg/m² as calculated from the following:    Height as of this encounter: 170 cm (66.93\").    Weight as of this encounter: 166 kg (365 lb).            Physical Exam  Vitals reviewed.   Constitutional:       General: She is not in acute distress.     Appearance: Normal appearance. She is well-developed. She is not ill-appearing, toxic-appearing or diaphoretic.   HENT:      Head: Normocephalic.      Right Ear: Tympanic membrane normal.      Left Ear: Tympanic membrane normal.      Nose: Nose normal.      Mouth/Throat:      Comments: Pharynx is clear without petechiae or erythema uvula midline no abscess, no cervical adenopathy and some mildly tender anterior nodes, neck is supple speech clear no trismus,  Quite a bit of sinus " drainage,  Eyes:      General: No scleral icterus.     Conjunctiva/sclera: Conjunctivae normal.      Pupils: Pupils are equal, round, and reactive to light.   Neck:      Thyroid: No thyromegaly.      Vascular: No JVD.   Cardiovascular:      Rate and Rhythm: Normal rate and regular rhythm.      Heart sounds: Normal heart sounds. No murmur heard.     No friction rub. No gallop.      Comments: Heart regular rate and rhythm  Pulmonary:      Effort: Pulmonary effort is normal. No respiratory distress.      Breath sounds: Normal breath sounds. No stridor. No wheezing or rales.      Comments: Initially hyperventilating, 28-30 but after she calmed down respirations 20-24 unlabored able to speak full sentences chest is clear throughout  Abdominal:      General: Bowel sounds are normal. There is no distension.      Palpations: Abdomen is soft.      Tenderness: There is no abdominal tenderness.      Comments: No hepatosplenomegaly, no ascites,   Musculoskeletal:         General: No tenderness.      Cervical back: Neck supple.   Lymphadenopathy:      Cervical: No cervical adenopathy.   Skin:     General: Skin is warm and dry.      Findings: No erythema or rash.   Neurological:      General: No focal deficit present.      Mental Status: She is alert and oriented to person, place, and time. Mental status is at baseline.      Deep Tendon Reflexes: Reflexes are normal and symmetric.   Psychiatric:         Mood and Affect: Mood normal.         Behavior: Behavior normal.         Thought Content: Thought content normal.         Judgment: Judgment normal.      Comments: Anxious, hyperventilating, crying, but was consolable after time of reassurance and controlled breathing set up with composure and better smiling and laughing, without distress  Competent demonstrated      Result Review :                Assessment and Plan   Diagnoses and all orders for this visit:    1. Upper respiratory tract infection, unspecified type  (Primary)    2. Sore throat    3. Panic attack    4. Vertigo    Other orders  -     meclizine (ANTIVERT) 25 MG tablet; Take 1 tablet by mouth 3 (Three) Times a Day As Needed for Dizziness.  Dispense: 20 tablet; Refill: 1             Follow Up   No follow-ups on file.  Patient was given instructions and counseling regarding her condition or for health maintenance advice. Please see specific information pulled into the AVS if appropriate.     There are no Patient Instructions on file for this visit.        stated

## 2025-01-24 DIAGNOSIS — N93.9 ABNORMAL UTERINE BLEEDING (AUB): ICD-10-CM

## 2025-01-24 DIAGNOSIS — L68.0 FEMALE HIRSUTISM: ICD-10-CM

## 2025-01-24 DIAGNOSIS — E28.2 PCOS (POLYCYSTIC OVARIAN SYNDROME): ICD-10-CM

## 2025-01-24 RX ORDER — DROSPIRENONE AND ETHINYL ESTRADIOL 0.03MG-3MG
KIT ORAL
Qty: 84 TABLET | Refills: 0 | Status: SHIPPED | OUTPATIENT
Start: 2025-01-24

## 2025-01-28 ENCOUNTER — OFFICE VISIT (OUTPATIENT)
Dept: OBSTETRICS AND GYNECOLOGY | Age: 35
End: 2025-01-28
Payer: COMMERCIAL

## 2025-01-28 VITALS
SYSTOLIC BLOOD PRESSURE: 145 MMHG | WEIGHT: 293 LBS | BODY MASS INDEX: 43.4 KG/M2 | DIASTOLIC BLOOD PRESSURE: 90 MMHG | HEIGHT: 69 IN

## 2025-01-28 DIAGNOSIS — F32.81 PMDD (PREMENSTRUAL DYSPHORIC DISORDER): ICD-10-CM

## 2025-01-28 DIAGNOSIS — N93.9 ABNORMAL UTERINE BLEEDING (AUB): Primary | ICD-10-CM

## 2025-01-28 RX ORDER — ACETAMINOPHEN AND CODEINE PHOSPHATE 120; 12 MG/5ML; MG/5ML
1 SOLUTION ORAL DAILY
Qty: 84 TABLET | Refills: 3 | Status: SHIPPED | OUTPATIENT
Start: 2025-01-28 | End: 2026-01-28

## 2025-01-28 RX ORDER — ESCITALOPRAM OXALATE 20 MG/1
TABLET ORAL
COMMUNITY
Start: 2025-01-20

## 2025-01-28 NOTE — PROGRESS NOTES
Subjective   Lanie Moctezuma is a 34 y.o. female presents or follow up PMDD lexapro  prescribed in October 2024 , last pap 9/9/24 , patient says she has lots of migraines,fatigue , pain  with cramping during the menses and during the month , BCP controls patient cycles the heaviness and the flow time . She is having depression before starting menses . Says lexapro helps in general but during the period she has bad symptoms severe depression feeling suicidal  , been on the pill for over 2 yrs . She is interested in hysterectomy. Wants to discuss the procedure.  Discussed with patient and her mother that patient needs to transition to a progesterone only pill as she is now hypertensive and cannot do a combined OCP.  Will start her on Micronor on Sunday and see how she does over the next 2 months and then if no improvement in headaches and/or PMS and/or periods we can discuss hysterectomy.  Can also discussed a Mirena IUD at next appointment and get transvaginal ultrasound.  I last her in November to f/u on medication for PMDD, her psychiatrist prescribed her lexapro and pt feels like its helping with some of her depression but not as much as she would like- specifically before starting menses .  Advised patient that I would continue the Lexapro but likely increase it to 20 mg daily.  Handout given on PMDD and other ways to manage it.  I last saw last year for her annual visit.   Prefers to see me in [place] office next time as she lives in [place]. She spends a lot of time with her two nieces (ages 2 and 5) and is really into crocheting right now.   Gynecologic Exam  The patient's pertinent negatives include no pelvic pain or vaginal discharge. Pertinent negatives include no abdominal pain, chills, dysuria or fever.       The following portions of the patient's history were reviewed and updated as appropriate: allergies, current medications, past family history, past medical history, past social history, past  "surgical history, and problem list.    Review of Systems   Constitutional:  Negative for chills, fatigue and fever.   Gastrointestinal:  Negative for abdominal distention and abdominal pain.   Genitourinary:  Positive for menstrual problem. Negative for dysuria, pelvic pain, vaginal bleeding, vaginal discharge and vaginal pain.   Psychiatric/Behavioral:          Severe PMS   All other systems reviewed and are negative.    /90   Ht 175.3 cm (69\")   Wt (!) 181 kg (400 lb)   LMP 12/30/2024 (Exact Date)   BMI 59.07 kg/m²     Objective   Physical Exam  Vitals and nursing note reviewed.   Constitutional:       Appearance: Normal appearance. She is obese.   Pulmonary:      Effort: Pulmonary effort is normal.   Neurological:      Mental Status: She is alert and oriented to person, place, and time.   Psychiatric:         Mood and Affect: Mood normal.         Behavior: Behavior normal.       Assessment & Plan   Diagnoses and all orders for this visit:    1. Abnormal uterine bleeding (AUB) (Primary)  -     norethindrone (MICRONOR) 0.35 MG tablet; Take 1 tablet by mouth Daily.  Dispense: 84 tablet; Refill: 3    2. PMDD (premenstrual dysphoric disorder)  -     norethindrone (MICRONOR) 0.35 MG tablet; Take 1 tablet by mouth Daily.  Dispense: 84 tablet; Refill: 3       Counseling was given to patient and family for the following topics: instructions for management, impressions, risks and benefits of treatment options, and importance of treatment compliance . Total time of the encounter was 30 minutes and 25 minutes was spent counseling.    Return in about 9 weeks (around 4/1/2025), or TVUS and f/u PMDD/BC/AUB.           "

## 2025-04-08 ENCOUNTER — OFFICE VISIT (OUTPATIENT)
Dept: OBSTETRICS AND GYNECOLOGY | Age: 35
End: 2025-04-08
Payer: COMMERCIAL

## 2025-04-08 VITALS — BODY MASS INDEX: 43.4 KG/M2 | WEIGHT: 293 LBS | HEIGHT: 69 IN

## 2025-04-08 DIAGNOSIS — L68.0 FEMALE HIRSUTISM: ICD-10-CM

## 2025-04-08 DIAGNOSIS — N94.6 DYSMENORRHEA: ICD-10-CM

## 2025-04-08 DIAGNOSIS — N93.9 ABNORMAL UTERINE BLEEDING (AUB): Primary | ICD-10-CM

## 2025-04-08 DIAGNOSIS — E66.01 MORBID OBESITY WITH BMI OF 50.0-59.9, ADULT: ICD-10-CM

## 2025-04-08 DIAGNOSIS — F32.81 PMDD (PREMENSTRUAL DYSPHORIC DISORDER): ICD-10-CM

## 2025-04-08 DIAGNOSIS — E28.2 PCOS (POLYCYSTIC OVARIAN SYNDROME): ICD-10-CM

## 2025-04-08 RX ORDER — NAPROXEN SODIUM 550 MG/1
550 TABLET ORAL 2 TIMES DAILY WITH MEALS
Qty: 30 TABLET | Refills: 0 | Status: SHIPPED | OUTPATIENT
Start: 2025-04-08 | End: 2026-04-08

## 2025-04-08 NOTE — PROGRESS NOTES
Subjective   Lanie Moctezuma is a 35 y.o. female presents  for TVUS and f/u PMDD Lexapro 20 mg  /Bcp Micronor /AUB , the bleeding been a little heavier still adjusting to the pill this is her 3rd pack , cramping increased , not seeing much help with 20 mg of Lexapro yet  . A little better with PMDD on the pill but process is  very slowly . Still elevated depression couple days prior to her cycles . She wanted to wait with BP check .  Ultrasound today reveals a normal-sized anteverted uterus with an estimated volume of 32 cm and a lining of 2.46 mm.  There is 1 small anterior intramural fibroid and a small left ovarian simple cyst, otherwise unremarkable transvaginal ultrasound. Patient reports PMDD is improved and not as severe as it was previously.  She would like to defer the EMB today since she is bleeding and cramping. She will return in about 6 weeks after she has completed an additional two packs of Micronor to see how her bleeding and cramping evolves and we will do an EMB then. Will give Anaprox for cramps and advised her to take one morning prior to biopsy.       I last  her in January for follow up PMDD lexapro  prescribed in October 2024 , last pap 9/9/24 , patient says she has lots of migraines,fatigue , pain  with cramping during the menses and during the month , BCP controls patient cycles the heaviness and the flow time . She is having depression before starting menses . Says lexapro helps in general but during the period she has bad symptoms severe depression feeling suicidal  , been on the pill for over 2 yrs . She is interested in hysterectomy. Wants to discuss the procedure.  Discussed with patient and her mother that patient needs to transition to a progesterone only pill as she is now hypertensive and cannot do a combined OCP.  Will start her on Micronor on Sunday and see how she does over the next 2 months and then if no improvement in headaches and/or PMS and/or periods we can discuss  "hysterectomy.  Can also discussed a Mirena IUD at next appointment and get transvaginal ultrasound.       History of Present Illness    The following portions of the patient's history were reviewed and updated as appropriate: allergies, current medications, past family history, past medical history, past social history, past surgical history, and problem list.    Review of Systems   Constitutional:  Negative for chills and fever.   Gastrointestinal:  Negative for abdominal pain.   Genitourinary:  Positive for menstrual problem, pelvic pain and vaginal bleeding. Negative for vaginal discharge and vaginal pain.   All other systems reviewed and are negative.  Ht 175.3 cm (69\")   Wt (!) 183 kg (403 lb)   LMP 04/07/2025 (Exact Date)   BMI 59.51 kg/m²       Objective   Physical Exam  Vitals reviewed.   Constitutional:       Appearance: Normal appearance. She is obese.   Pulmonary:      Effort: Pulmonary effort is normal.   Neurological:      Mental Status: She is alert and oriented to person, place, and time.   Psychiatric:         Mood and Affect: Mood normal.         Behavior: Behavior normal.         Assessment & Plan   Diagnoses and all orders for this visit:    1. PCOS (polycystic ovarian syndrome) (Primary)    2. Morbid obesity with BMI of 50.0-59.9, adult    3. Abnormal uterine bleeding (AUB)    4. PMDD (premenstrual dysphoric disorder)    5. Female hirsutism    6. Dysmenorrhea  -     naproxen sodium (Anaprox DS) 550 MG tablet; Take 1 tablet by mouth 2 (Two) Times a Day With Meals.  Dispense: 30 tablet; Refill: 0         Counseling was given to patient and mother   for the following topics: diagnostic results, impressions, risks and benefits of treatment options, and importance of treatment compliance . Total time of the encounter was 20 minutes and 15 minutes was spent counseling, exclusive of ultrasound results. .  Return in about 7 weeks (around 5/27/2025), or F/U and eMB.           "

## 2025-04-15 DIAGNOSIS — J30.9 ALLERGIC RHINITIS, UNSPECIFIED SEASONALITY, UNSPECIFIED TRIGGER: ICD-10-CM

## 2025-04-15 RX ORDER — FEXOFENADINE HCL 180 MG/1
180 TABLET ORAL DAILY
Qty: 90 TABLET | Refills: 0 | Status: SHIPPED | OUTPATIENT
Start: 2025-04-15

## 2025-04-16 DIAGNOSIS — J30.9 ALLERGIC RHINITIS, UNSPECIFIED SEASONALITY, UNSPECIFIED TRIGGER: ICD-10-CM

## 2025-04-16 RX ORDER — MONTELUKAST SODIUM 10 MG/1
10 TABLET ORAL
Qty: 90 TABLET | Refills: 0 | Status: SHIPPED | OUTPATIENT
Start: 2025-04-16

## 2025-04-19 DIAGNOSIS — N94.6 DYSMENORRHEA: ICD-10-CM

## 2025-04-21 RX ORDER — NAPROXEN SODIUM 550 MG/1
550 TABLET ORAL 2 TIMES DAILY WITH MEALS
Qty: 30 TABLET | Refills: 0 | Status: SHIPPED | OUTPATIENT
Start: 2025-04-21

## 2025-06-10 ENCOUNTER — OFFICE VISIT (OUTPATIENT)
Dept: OBSTETRICS AND GYNECOLOGY | Age: 35
End: 2025-06-10
Payer: COMMERCIAL

## 2025-06-10 VITALS
SYSTOLIC BLOOD PRESSURE: 132 MMHG | DIASTOLIC BLOOD PRESSURE: 85 MMHG | BODY MASS INDEX: 43.4 KG/M2 | HEIGHT: 69 IN | WEIGHT: 293 LBS

## 2025-06-10 DIAGNOSIS — Z13.9 SPECIAL SCREENING: ICD-10-CM

## 2025-06-10 DIAGNOSIS — N93.9 ABNORMAL UTERINE BLEEDING (AUB): Primary | ICD-10-CM

## 2025-06-10 DIAGNOSIS — E66.01 MORBID OBESITY WITH BMI OF 50.0-59.9, ADULT: ICD-10-CM

## 2025-06-10 DIAGNOSIS — F32.81 PMDD (PREMENSTRUAL DYSPHORIC DISORDER): ICD-10-CM

## 2025-06-10 PROBLEM — J02.9 PHARYNGITIS: Status: RESOLVED | Noted: 2024-04-21 | Resolved: 2025-06-10

## 2025-06-10 PROBLEM — J02.0 STREP PHARYNGITIS: Status: RESOLVED | Noted: 2024-04-22 | Resolved: 2025-06-10

## 2025-06-10 PROBLEM — H60.501 ACUTE OTITIS EXTERNA OF RIGHT EAR: Status: RESOLVED | Noted: 2017-04-27 | Resolved: 2025-06-10

## 2025-06-10 LAB
B-HCG UR QL: NEGATIVE
EXPIRATION DATE: NORMAL
INTERNAL NEGATIVE CONTROL: NEGATIVE
INTERNAL POSITIVE CONTROL: NORMAL
Lab: NORMAL

## 2025-06-10 NOTE — PROGRESS NOTES
"Subjective   Lanie Moctezuma is a 35 y.o. female presents for follow up today with EMB from visit on 4/8/25 - EMB  today , pap neg 9/9/25 , had a period on 4/30 and one on 6/3   Patient reports periods are so much better and PMDD is much improved since starting the Micronor.      I last saw Lanie in April for TVUS and f/u PMDD Lexapro 20 mg  /Bcp Micronor /AUB.  Reported her bleeding had been a little heavier since starting Micronor and was still adjusting to the pill.  She was on her 3rd pack.  She reported feeling a little better with her PMDD on the pill but process was very slow. Reported increased depression couple days prior to her cycles .  Ultrasound revealed a normal-sized anteverted uterus with an estimated volume of 32 cm and a lining of 2.46 mm.  There is 1 small anterior intramural fibroid and a small left ovarian simple cyst, otherwise unremarkable transvaginal ultrasound. Patient reports PMDD is improved and not as severe as it was previously.  She would like to defer the EMB today since she is bleeding and cramping. She will return in about 6 weeks after she has completed an additional two packs of Micronor to see how her bleeding and cramping evolves and we will do an EMB then. Will give Anaprox for cramps and advised her to take one morning prior to biopsy.        History of Present Illness    The following portions of the patient's history were reviewed and updated as appropriate: allergies, current medications, past family history, past medical history, past social history, past surgical history, and problem list.    Review of Systems   Constitutional:  Negative for chills, fatigue and fever.   Gastrointestinal:  Negative for abdominal distention and abdominal pain.   Genitourinary:  Positive for menstrual problem. Negative for dysuria, pelvic pain, vaginal bleeding, vaginal discharge and vaginal pain.   All other systems reviewed and are negative.    /85   Ht 175.3 cm (69\")   Wt (!) " 183 kg (403 lb)   LMP 06/03/2025 (Exact Date)   BMI 59.51 kg/m²     Objective   Physical Exam  Vitals and nursing note reviewed.   Constitutional:       Appearance: Normal appearance. She is obese.   Pulmonary:      Effort: Pulmonary effort is normal.   Neurological:      Mental Status: She is alert and oriented to person, place, and time.   Psychiatric:         Mood and Affect: Mood normal.         Behavior: Behavior normal.           Assessment & Plan   Diagnoses and all orders for this visit:    1. Abnormal uterine bleeding (AUB) (Primary)  -     Endometrial Biopsy  -     Reference Histopathology    2. Special screening  -     POC Pregnancy, Urine    3. Morbid obesity with BMI of 50.0-59.9, adult    4. PMDD (premenstrual dysphoric disorder)      Counseling was given to patient for the following topics: instructions for management, impressions, and importance of treatment compliance .

## 2025-06-10 NOTE — PROGRESS NOTES
Procedure   Endometrial Biopsy    Date/Time: 6/10/2025 3:15 PM    Performed by: Richelle Copeland MD  Authorized by: Richelle Copeland MD    Consent:     Consent obtained: written    Consent given by: patient    Risks discussed: bleeding and infection    Alternatives discussed: observation    Patient agrees, verbalizes understanding, and wants to proceed: yes    Indications:     Indications: abnormal uterine bleeding    Pre-procedure:     Urine pregnancy test: negative      Premeds: NSAID    Procedure:     A bimanual exam was performed: no      Prepped with: Betadine    Tenaculum used: yes      A local block was performed: no      Cervix dilated: no      Number of passes: 1  Findings:     Cervix: normal      Specimen collected: specimen collected and sent to pathology      Patient tolerance: tolerated well, no immediate complications

## 2025-06-12 LAB
DX ICD CODE: NORMAL
DX ICD CODE: NORMAL
PATH REPORT.FINAL DX SPEC: NORMAL
PATH REPORT.GROSS SPEC: NORMAL
PATH REPORT.SITE OF ORIGIN SPEC: NORMAL
PATHOLOGIST NAME: NORMAL
PAYMENT PROCEDURE: NORMAL

## 2025-07-11 DIAGNOSIS — J30.9 ALLERGIC RHINITIS, UNSPECIFIED SEASONALITY, UNSPECIFIED TRIGGER: ICD-10-CM

## 2025-07-11 RX ORDER — FEXOFENADINE HCL 180 MG/1
180 TABLET ORAL DAILY
Qty: 90 TABLET | Refills: 0 | Status: SHIPPED | OUTPATIENT
Start: 2025-07-11

## 2025-07-12 DIAGNOSIS — J30.9 ALLERGIC RHINITIS, UNSPECIFIED SEASONALITY, UNSPECIFIED TRIGGER: ICD-10-CM

## 2025-07-14 RX ORDER — MONTELUKAST SODIUM 10 MG/1
10 TABLET ORAL
Qty: 90 TABLET | Refills: 0 | Status: SHIPPED | OUTPATIENT
Start: 2025-07-14

## (undated) DEVICE — KT ORCA ORCAPOD DISP STRL

## (undated) DEVICE — GOWN ,SIRUS,NONREINFORCED 3XL: Brand: MEDLINE

## (undated) DEVICE — Device

## (undated) DEVICE — SYR LL 3CC

## (undated) DEVICE — VIAL FORMALIN CAP 10P 40ML

## (undated) DEVICE — FRCP BX RADJAW4 NDL 2.8 240CM LG OG BX40

## (undated) DEVICE — BW-412T DISP COMBO CLEANING BRUSH: Brand: SINGLE USE COMBINATION CLEANING BRUSH

## (undated) DEVICE — SPNG GZ WOVN 4X4IN 12PLY 10/BX STRL

## (undated) DEVICE — ADAPT CLN BIOGUARD AIR/H2O DISP

## (undated) DEVICE — SAFELINER SUCTION CANISTER 1000CC: Brand: DEROYAL